# Patient Record
Sex: MALE | Race: BLACK OR AFRICAN AMERICAN | NOT HISPANIC OR LATINO | Employment: FULL TIME | URBAN - METROPOLITAN AREA
[De-identification: names, ages, dates, MRNs, and addresses within clinical notes are randomized per-mention and may not be internally consistent; named-entity substitution may affect disease eponyms.]

---

## 2017-02-01 ENCOUNTER — HOSPITAL ENCOUNTER (EMERGENCY)
Facility: HOSPITAL | Age: 52
Discharge: HOME | End: 2017-02-01
Attending: EMERGENCY MEDICINE
Payer: COMMERCIAL

## 2017-02-01 VITALS
HEIGHT: 71 IN | TEMPERATURE: 97 F | HEART RATE: 67 BPM | WEIGHT: 260 LBS | OXYGEN SATURATION: 99 % | DIASTOLIC BLOOD PRESSURE: 102 MMHG | BODY MASS INDEX: 36.4 KG/M2 | RESPIRATION RATE: 18 BRPM | SYSTOLIC BLOOD PRESSURE: 161 MMHG

## 2017-02-01 DIAGNOSIS — M25.531 RIGHT WRIST PAIN: Primary | ICD-10-CM

## 2017-02-01 LAB
ANION GAP SERPL CALC-SCNC: 11 MMOL/L
BUN SERPL-MCNC: 17 MG/DL
CALCIUM SERPL-MCNC: 9.4 MG/DL
CHLORIDE SERPL-SCNC: 108 MMOL/L
CO2 SERPL-SCNC: 20 MMOL/L
CREAT SERPL-MCNC: 1 MG/DL
EST. GFR  (AFRICAN AMERICAN): >60 ML/MIN/1.73 M^2
EST. GFR  (NON AFRICAN AMERICAN): >60 ML/MIN/1.73 M^2
GLUCOSE SERPL-MCNC: 94 MG/DL
POTASSIUM SERPL-SCNC: 4 MMOL/L
SODIUM SERPL-SCNC: 139 MMOL/L
URATE SERPL-MCNC: 5.9 MG/DL

## 2017-02-01 PROCEDURE — 84550 ASSAY OF BLOOD/URIC ACID: CPT

## 2017-02-01 PROCEDURE — 80048 BASIC METABOLIC PNL TOTAL CA: CPT

## 2017-02-01 PROCEDURE — 25000003 PHARM REV CODE 250: Performed by: EMERGENCY MEDICINE

## 2017-02-01 PROCEDURE — 99283 EMERGENCY DEPT VISIT LOW MDM: CPT

## 2017-02-01 RX ORDER — LISINOPRIL 10 MG/1
10 TABLET ORAL
Status: DISCONTINUED | OUTPATIENT
Start: 2017-02-01 | End: 2017-02-01

## 2017-02-01 RX ORDER — NAPROXEN 500 MG/1
500 TABLET ORAL 2 TIMES DAILY WITH MEALS
Qty: 30 TABLET | Refills: 0 | Status: SHIPPED | OUTPATIENT
Start: 2017-02-01 | End: 2017-11-12

## 2017-02-01 RX ORDER — CLONIDINE HYDROCHLORIDE 0.2 MG/1
0.2 TABLET ORAL
Status: COMPLETED | OUTPATIENT
Start: 2017-02-01 | End: 2017-02-01

## 2017-02-01 RX ORDER — AMLODIPINE BESYLATE 5 MG/1
5 TABLET ORAL
Status: COMPLETED | OUTPATIENT
Start: 2017-02-01 | End: 2017-02-01

## 2017-02-01 RX ORDER — COLCHICINE 0.6 MG/1
0.6 TABLET ORAL DAILY
COMMUNITY
End: 2021-08-06 | Stop reason: CLARIF

## 2017-02-01 RX ADMIN — AMLODIPINE BESYLATE 5 MG: 5 TABLET ORAL at 12:02

## 2017-02-01 RX ADMIN — CLONIDINE HYDROCHLORIDE 0.2 MG: 0.2 TABLET ORAL at 12:02

## 2017-02-01 NOTE — ED NOTES
Pt states he is having a gout flare up. C/o pain, swelling, and redness to right wrist for the past few days. States he normally controls his gout with his diet, but knows he ate and drank things that he is not supposed to in the past few weeks. Has been taking allopurinol, but is out of his colchicine.

## 2017-02-01 NOTE — ED AVS SNAPSHOT
[facility]  [address]  [place] [address]               Jerel WOODS Ponce   2017  8:59 AM   ED    Description:  Male : 1965   Department:  [facility]           Your Care was Coordinated By:     Provider Role From To    Christiane Christianson MD Attending Provider 17 1113 --    ROBBIE Johnson Physician Assistant 17 0906 --      Reason for Visit     Joint Swelling           Diagnoses this Visit        Comments    Right wrist pain    -  Primary       ED Disposition     None           To Do List           Follow-up Information     Go to Peter Barnes Jr, MD.    Specialties:  Hand Surgery, Orthopedic Surgery    Contact information:    [address]  [address]  [place] [address]  [phone]         These Medications        Disp Refills Start End    naproxen (NAPROSYN) 500 MG tablet 30 tablet 0 2017     Take 1 tablet (500 mg total) by mouth 2 (two) times daily with meals. - Oral      [facility]     [facility] -  Assistance  Registered nurses in the [facility] provide clinical advisement, health education, appointment booking, and other advisory services.  Call for this free service at [phone].             Medications           Message regarding Medications     Verify the changes and/or additions to your medication regime listed below are the same as discussed with your clinician today.  If any of these changes or additions are incorrect, please notify your healthcare provider.        START taking these NEW medications        Refills    naproxen (NAPROSYN) 500 MG tablet 0    Sig: Take 1 tablet (500 mg total) by mouth 2 (two) times daily with meals.    Class: Print    Route: Oral      STOP taking these medications     methocarbamol (ROBAXIN) 500 MG Tab Take 2 tablets (1,000 mg total) by mouth every 6 (six) hours as needed.    tramadol (ULTRAM) 50 mg tablet Take 1  "tablet (50 mg total) by mouth every 6 (six) hours as needed for Pain.    indomethacin (INDOCIN) 50 MG capsule Take 50 mg by mouth 3 (three) times daily.           Verify that the below list of medications is an accurate representation of the medications you are currently taking.  If none reported, the list may be blank. If incorrect, please contact your healthcare provider. Carry this list with you in case of emergency.           Current Medications     colchicine 0.6 mg tablet Take 0.6 mg by mouth once daily.    albuterol 90 mcg/actuation inhaler Inhale 1-2 puffs into the lungs every 6 (six) hours as needed for Wheezing.    allopurinol (ZYLOPRIM) 300 MG tablet Take 300 mg by mouth once daily.    lisinopril 10 MG tablet Take 10 mg by mouth once daily.    naproxen (NAPROSYN) 500 MG tablet Take 1 tablet (500 mg total) by mouth 2 (two) times daily with meals.           Clinical Reference Information           Your Vitals Were     BP Pulse Temp Resp Height Weight    168/85 81 97.4 °F (36.3 °C) (Oral) 18 5' 11" (1.803 m) 117.9 kg (260 lb)    SpO2 BMI             99% 36.26 kg/m2         Allergies as of 2/1/2017        Reactions    Aspirin       Immunizations Administered on Date of Encounter - 2/1/2017     None      ED Micro, Lab, POCT     Start Ordered       Status Ordering Provider    02/01/17 1026 02/01/17 1025  Basic metabolic panel  STAT      Final result     02/01/17 1004 02/01/17 1003    Add-on,   Status:  Canceled      Canceled     02/01/17 0930 02/01/17 0930  Uric acid  Once      Final result       ED Imaging Orders     None        Discharge Instructions         Arthralgia    Arthralgia is the term for pain in or around the joint. It is a symptom, not a disease. This pain may involve one or more joints. In some cases, the pain moves from joint to joint.  There are many causes for joint pain. These include:  · Injury  · Osteoarthritis (wearing out of the joint surface)  · Gout (inflammation of the joint due to " crystals in the joint fluid)  · Infection inside the joint    · Bursitis (inflammation of the fluid-filled sacs around the joint)  · Autoimmune disorders such as rheumatoid arthritis or lupus  · Tendonitis (inflamation of chords that attach muscle to bone)  Home care  · Rest the involved joint(s) until your symptoms improve.   · You may be prescribed pain medication. If none is prescribed, you may use acetaminophen or ibuprofen to control pain and inflammation.  Follow up  Follow up with your healthcare provider or our staff as advised.  When to seek medical care  Contact your healthcare provider right away if any of the following occurs:  · Pain, swelling, or redness of joint increases  · Pain worsens or recurs after a period of improvement  · Pain moves to other joints  · You cannot bear weight on the affected joint   · You cannot move the affected joint  · Joint appears deformed  · New rash appears  · Fever of 101ºF (38.8ºC) or higher, or as directed by your healthcare provider  © 7219-1294 [employer]. [address] [place] [address]. All rights reserved. This information is not intended as a substitute for professional medical care. Always follow your healthcare professional's instructions.          Discharge References/Attachments     GANGLION CYST: HAND (ENGLISH)    CARPAL TUNNEL SYNDROME (ENGLISH)      [facility] Sign-Up     Activating your [facility] account is as easy as 1-2-3!     1) Visit [email or web address], select Sign Up Now, enter this activation code and your date of birth, then select Next.  [email or web address]  Expires: 3/18/2017 11:47 AM      2) Create a username and password to use when you visit [facility] in the future and select a security question in case you lose your password and select Next.    3) Enter your e-mail address and click Sign Up!    Additional Information  If you have questions, please e-mail [email or web address] or call [phone] to talk to our [facility]  staff. Remember, [facility] is NOT to be used for urgent needs. For medical emergencies, dial 911.          [facility] complies with applicable Federal civil rights laws and does not discriminate on the basis of race, color, national origin, age, disability, or sex.        Language Assistance Services     ATTENTION: Language assistance services are available, free of charge. Please call [phone].      ATENCIÓN: Si habla español, tiene a arreola disposición servicios gratuitos de asistencia lingüística. Llame al [phone].     CHÚ Ý: N?u b?n nói Ti?ng Vi?t, có các d?ch v? h? tr? ngôn ng? mi?n phí dành cho b?n. G?i s? [phone].

## 2017-02-01 NOTE — DISCHARGE INSTRUCTIONS
Arthralgia    Arthralgia is the term for pain in or around the joint. It is a symptom, not a disease. This pain may involve one or more joints. In some cases, the pain moves from joint to joint.  There are many causes for joint pain. These include:  · Injury  · Osteoarthritis (wearing out of the joint surface)  · Gout (inflammation of the joint due to crystals in the joint fluid)  · Infection inside the joint    · Bursitis (inflammation of the fluid-filled sacs around the joint)  · Autoimmune disorders such as rheumatoid arthritis or lupus  · Tendonitis (inflamation of chords that attach muscle to bone)  Home care  · Rest the involved joint(s) until your symptoms improve.   · You may be prescribed pain medication. If none is prescribed, you may use acetaminophen or ibuprofen to control pain and inflammation.  Follow up  Follow up with your healthcare provider or our staff as advised.  When to seek medical care  Contact your healthcare provider right away if any of the following occurs:  · Pain, swelling, or redness of joint increases  · Pain worsens or recurs after a period of improvement  · Pain moves to other joints  · You cannot bear weight on the affected joint   · You cannot move the affected joint  · Joint appears deformed  · New rash appears  · Fever of 101ºF (38.8ºC) or higher, or as directed by your healthcare provider  © 5918-1364 [employer]. [address] [place] [address], [place] [address]. All rights reserved. This information is not intended as a substitute for professional medical care. Always follow your healthcare professional's instructions.

## 2017-02-01 NOTE — ED PROVIDER NOTES
"Encounter Date: 2/1/2017       History     Chief Complaint   Patient presents with    Joint Swelling     right wrist; states is a "gout flareup" x3 days; swelling noted; states has not been compliant with diet     Review of patient's allergies indicates:   Allergen Reactions    Aspirin      HPI Comments: Jerel Serra 51 y.o.nontoxic/afebrile male with reported PMH of gout and HTN presented to the ED with C/O right wrist pain for the past three days.  He denies any injury to the area and describes the pain as similar to previous episodes of pain for which he was told was gout. 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 He does report some localized swelling of the wrist for some time that was initially nontender but has caused some pain over the past several days. He denies any numbness, tingling, fever, chills, weakness, dropping objects, redness or pain radiation. He reports no relief with allopurinol and that a steroid injection has helped in the past. he has not seen orthopedic regarding this and did not take his BP med's today.    The history is provided by the patient.     Past Medical History   Diagnosis Date    Gout     Gout attack     Hypertension      No past medical history pertinent negatives.  History reviewed. No pertinent past surgical history.  Family History   Problem Relation Age of Onset    Hypertension Mother     Diabetes Father      Social History   Substance Use Topics    Smoking status: Never Smoker    Smokeless tobacco: None    Alcohol use No     Review of Systems   Constitutional: Positive for activity change. Negative for chills and fever.   Eyes: Negative for visual disturbance.   Respiratory: Negative for chest tightness and shortness of breath.    Cardiovascular: Negative for chest pain.   Gastrointestinal: Negative for nausea and vomiting.   Genitourinary: Negative for dysuria.   Musculoskeletal: Positive for arthralgias. Negative for " back pain, joint swelling and myalgias.        Right wrist pain   Skin: Negative for color change and rash.   Allergic/Immunologic: Negative for immunocompromised state.   Neurological: Negative for weakness and numbness.   Hematological: Does not bruise/bleed easily.   All other systems reviewed and are negative.      Physical Exam   Initial Vitals   BP Pulse Resp Temp SpO2   02/01/17 0848 02/01/17 0848 02/01/17 0848 02/01/17 0848 02/01/17 0848   168/85 81 18 97.4 °F (36.3 °C) 99 %     Physical Exam    Nursing note and vitals reviewed.  Constitutional: Vital signs are normal. He appears well-developed and well-nourished. He is cooperative.  Non-toxic appearance. He does not appear ill. No distress.   HENT:   Head: Normocephalic and atraumatic.   Eyes: Conjunctivae and lids are normal.   Neck: Trachea normal and normal range of motion. Neck supple. No stridor present. No tracheal deviation present.   Cardiovascular: Normal rate and regular rhythm.   Pulmonary/Chest: Breath sounds normal. No respiratory distress. He has no wheezes.   Abdominal: Soft. Normal appearance.   Musculoskeletal: Normal range of motion.        Right wrist: He exhibits tenderness and swelling. He exhibits normal range of motion, no bony tenderness, no crepitus and no deformity.        Right hand: He exhibits normal capillary refill. Normal sensation noted. Normal strength noted.        Hands:  TTP of the right dorsal wrist along the ulnar styloid region. There is small freely movable area cystitic like nodule noted to the wrist with no fluctuance, erythema or drainage   Neurological: He is alert and oriented to person, place, and time. He has normal strength. No sensory deficit. GCS eye subscore is 4. GCS verbal subscore is 5. GCS motor subscore is 6.   Skin: Skin is warm, dry and intact. No abrasion, no bruising, no ecchymosis and no rash noted. No erythema.   Psychiatric: He has a normal mood and affect. His speech is normal and behavior is  normal. Thought content normal.         ED Course   Procedures  Labs Reviewed   BASIC METABOLIC PANEL - Abnormal; Notable for the following:        Result Value    CO2 20 (*)     All other components within normal limits   URIC ACID       Jerel Serra 51 y.o.nontoxic/afebrile male with reported PMH of gout and HTN presented to the ED with C/O right wrist pain for the past three days.  He denies any injury to the area and describes the pain as similar to previous episodes of pain for which he was told was gout. 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 He does report some localized swelling of the wrist for some time that was initially nontender but has caused some pain over the past several days. He denies any numbness, tingling, fever, chills, weakness, dropping objects, redness or pain radiation. He reports no relief with allopurinol and that a steroid injection has helped in the past. he has not seen orthopedic regarding this and did not take his BP med's today. ROS positive for right wrist pain.  Physical exam reveals patient well appearing in no obvious distress. FROM of the right upper extremity with some pain of flexion about the right wrist. TTP of the right dorsal wrist along the ulnar styloid region. There is small freely movable area cystitic like nodule noted to the wrist with no fluctuance, erythema or drainage. Patient does have pain exacerbation with Phalen's test. neurovascularly intact.     DDX: gout, OA, pain with ganglion cyst, carpal tunnel, septic joint    ED management: Uric acid and BMP with no significant findings. Low suspicion for infectious process as patient remains well appearing and afebrile. We will send home with NSAID's for inflammation as he has not tired this with follow up with hand specialist. Discussed monitoring BP as it was elevated, low sodium diet,  importance of taking medications and that patient should follow up with PCP next  week.      Impression/Plan:The encounter diagnosis was Right wrist pain. discharged with naprosyn. Patient will follow up with Primary.  Patient cautioned on when to return to ED.  Pt. Understands and agrees with current treatment plan                      Attending Attestation:     Physician Attestation Statement for NP/PA:   I have conducted a face to face encounter with this patient in addition to the NP/PA, due to NP/PA Request    Other NP/PA Attestation Additions:    History of Present Illness: 50 Y/O AAM WITH PMHX OF HTN PRESENTS WITH C/O RIGHT WRIST PAIN x 3 DAYS THAT HE DESCRIBES AS A GOUT FLARE UP.  PAIN WITH ROM AND PALPATION.  + RIGHT WRIST SWELLING.  NO TRAUMA.  NO FEVER/CHILLS.  NO NUMBNESS/TINGLING/WEAKNESS.  NO REDNESS.    Physical Exam: AGREE   Medical Decision Making: AGREE:  PT HAS HTN AND HAS NOT TAKEN HIS MEDS TODAY.  PT IS ON AMLODIPINE DAILY.  HE IS TREATED WITH CLONIDINE AND AMLODIPINE IN ED AND B/P IS TRENDING DOWN SO HE WILL BE D/C HOME WITH CLOSE PCP F/U.  PT EDUCATED ON LOW SALT DIET. HE WILL BE RX NAPROSYN AND F/U WITH DR. CHAMBERLAIN                 ED Course     Clinical Impression:   The encounter diagnosis was Right wrist pain.    Disposition:   Disposition: Discharged  Condition: Stable       ROBBIE Johnson  02/03/17 8652       Christiane Christianson MD  02/04/17 7284

## 2017-11-12 ENCOUNTER — HOSPITAL ENCOUNTER (EMERGENCY)
Facility: HOSPITAL | Age: 52
Discharge: HOME | End: 2017-11-12
Attending: EMERGENCY MEDICINE
Payer: COMMERCIAL

## 2017-11-12 VITALS
WEIGHT: 259 LBS | OXYGEN SATURATION: 96 % | HEIGHT: 71 IN | BODY MASS INDEX: 36.26 KG/M2 | HEART RATE: 77 BPM | SYSTOLIC BLOOD PRESSURE: 142 MMHG | RESPIRATION RATE: 18 BRPM | TEMPERATURE: 99 F | DIASTOLIC BLOOD PRESSURE: 95 MMHG

## 2017-11-12 DIAGNOSIS — J02.0 STREP PHARYNGITIS: Primary | ICD-10-CM

## 2017-11-12 LAB
DEPRECATED S PYO AG THROAT QL EIA: POSITIVE
FLUAV AG SPEC QL IA: NEGATIVE
FLUBV AG SPEC QL IA: NEGATIVE
SPECIMEN SOURCE: NORMAL

## 2017-11-12 PROCEDURE — 99283 EMERGENCY DEPT VISIT LOW MDM: CPT | Mod: 25

## 2017-11-12 PROCEDURE — 96372 THER/PROPH/DIAG INJ SC/IM: CPT

## 2017-11-12 PROCEDURE — 25000003 PHARM REV CODE 250: Performed by: PHYSICIAN ASSISTANT

## 2017-11-12 PROCEDURE — 63600175 PHARM REV CODE 636 W HCPCS: Performed by: PHYSICIAN ASSISTANT

## 2017-11-12 PROCEDURE — 87400 INFLUENZA A/B EACH AG IA: CPT | Mod: 59

## 2017-11-12 PROCEDURE — 87880 STREP A ASSAY W/OPTIC: CPT

## 2017-11-12 RX ORDER — ONDANSETRON 4 MG/1
4 TABLET, ORALLY DISINTEGRATING ORAL
Status: COMPLETED | OUTPATIENT
Start: 2017-11-12 | End: 2017-11-12

## 2017-11-12 RX ORDER — KETOROLAC TROMETHAMINE 10 MG/1
10 TABLET, FILM COATED ORAL
Status: COMPLETED | OUTPATIENT
Start: 2017-11-12 | End: 2017-11-12

## 2017-11-12 RX ORDER — DEXAMETHASONE SODIUM PHOSPHATE 4 MG/ML
8 INJECTION, SOLUTION INTRA-ARTICULAR; INTRALESIONAL; INTRAMUSCULAR; INTRAVENOUS; SOFT TISSUE
Status: COMPLETED | OUTPATIENT
Start: 2017-11-12 | End: 2017-11-12

## 2017-11-12 RX ORDER — IBUPROFEN 600 MG/1
600 TABLET ORAL EVERY 6 HOURS PRN
Qty: 20 TABLET | Refills: 0 | Status: SHIPPED | OUTPATIENT
Start: 2017-11-12 | End: 2019-09-06 | Stop reason: SDUPTHER

## 2017-11-12 RX ADMIN — KETOROLAC TROMETHAMINE 10 MG: 10 TABLET, FILM COATED ORAL at 09:11

## 2017-11-12 RX ADMIN — ONDANSETRON 4 MG: 4 TABLET, ORALLY DISINTEGRATING ORAL at 09:11

## 2017-11-12 RX ADMIN — DEXAMETHASONE SODIUM PHOSPHATE 8 MG: 4 INJECTION, SOLUTION INTRAMUSCULAR; INTRAVENOUS at 09:11

## 2017-11-12 RX ADMIN — PENICILLIN G BENZATHINE 1.2 MILLION UNITS: 1200000 INJECTION, SUSPENSION INTRAMUSCULAR at 09:11

## 2017-11-12 NOTE — ED PROVIDER NOTES
Encounter Date: 11/12/2017       History     Chief Complaint   Patient presents with    Generalized Body Aches     c/o sore throat and body aches since Friday     DD 1-year-old male presents to the ED with complaints of URI symptoms that began Friday he complains of sore throat, lymphadenopathy generalized body aches and nausea.  He does note some mild nasal congestion and postnasal drip.  Denies any other symptoms at this time including fever, cough, chest pain, shortness of breath, vomiting, diarrhea or rash.  He did try NyQuil yesterday with little relief of symptoms.  He denies any sick contacts however things at the local [employer].      The history is provided by the patient.     Review of patient's allergies indicates:   Allergen Reactions    Aspirin      Past Medical History:   Diagnosis Date    Gout     Gout attack     Hypertension      History reviewed. No pertinent surgical history.  Family History   Problem Relation Age of Onset    Hypertension Mother     Diabetes Father      Social History   Substance Use Topics    Smoking status: Never Smoker    Smokeless tobacco: Not on file    Alcohol use No     Review of Systems   Constitutional: Positive for appetite change and chills. Negative for fever.   HENT: Positive for congestion, postnasal drip and sore throat.    Respiratory: Negative for cough and shortness of breath.    Cardiovascular: Negative for chest pain.   Gastrointestinal: Positive for nausea. Negative for vomiting.   Genitourinary: Negative for dysuria.   Musculoskeletal: Negative for back pain, neck pain and neck stiffness.   Skin: Negative for rash.   Neurological: Negative for weakness and headaches.   Hematological: Positive for adenopathy. Does not bruise/bleed easily.       Physical Exam     Initial Vitals [11/12/17 0852]   BP Pulse Resp Temp SpO2   (!) 142/95 77 18 98.7 °F (37.1 °C) 96 %      MAP       110.67         Physical Exam    Nursing note and vitals  reviewed.  Constitutional: Vital signs are normal. He appears well-developed and well-nourished. He is cooperative.  Non-toxic appearance. He does not appear ill. No distress.   HENT:   Head: Normocephalic and atraumatic.   Nose: Mucosal edema present.   Oropharynx with erythema; bilateral tonsillar edema and erythema   Eyes: Conjunctivae and lids are normal.   Neck: Trachea normal and normal range of motion. Neck supple. No stridor present. No tracheal deviation present.   Cardiovascular: Normal rate and regular rhythm.   Pulmonary/Chest: Breath sounds normal. No respiratory distress. He has no wheezes. He has no rhonchi.   Abdominal: Soft. Normal appearance.   Musculoskeletal: Normal range of motion.   Lymphadenopathy:        Head (right side): Tonsillar adenopathy present.        Head (left side): Tonsillar adenopathy present.   Neurological: He is alert and oriented to person, place, and time. GCS eye subscore is 4. GCS verbal subscore is 5. GCS motor subscore is 6.   Skin: Skin is warm, dry and intact. No rash noted.   Psychiatric: He has a normal mood and affect. His speech is normal and behavior is normal. Thought content normal.         ED Course   Procedures  Labs Reviewed   THROAT SCREEN, RAPID - Abnormal; Notable for the following:        Result Value    Rapid Strep A Screen Positive (*)     All other components within normal limits   INFLUENZA A AND B ANTIGEN       Jerel Serra, a nontoxic/well appearing, afebrile, 51 y.o., male, presented to the ED with c/o URI symptoms. ROS positive for sore throat, body aches, nausea & lymphadenopathy. ROS negative for rash, abdominal pain, vomiting, diarrhea, CP, SOB, cough, ear pain, numbness, weakness, tingling, or any other complaints. Physical exam reveals Oropharynx with erythema; bilateral tonsillar edema and erythema; uvula midline; bilateral tonsillar lymphadenopathy.  No other significant abnormalities on exam    DDX: Strep, Influenza, URI    ED  management:Strep screen positive.  Patient is a Cent[facility] 2 out of 4 with a positive test L be treated with Bicillin.  Decadron, Zofran and Toradol in the ED with improvement in symptoms.  Instructed to take anti-inflammatories as directed    Impression/Plan: Strep pharyngitis Patient will follow up with PCP or the [facility] clinic in 2-3 days for a recheck. Precautions on when to return to the ED given. Patient verbalizes and agrees with current treatment plan.                      Attending Attestation:     Physician Attestation Statement for NP/PA:   I discussed this assessment and plan of this patient with the NP/PA, but I did not personally examine the patient. The face to face encounter was performed by the NP/PA.                  ED Course      Clinical Impression:   The encounter diagnosis was Strep pharyngitis.                           ROBBIE Johnson  11/12/17 0971       Tyler Rocha MD  11/12/17 9273

## 2017-11-23 ENCOUNTER — HOSPITAL ENCOUNTER (EMERGENCY)
Facility: HOSPITAL | Age: 52
Discharge: HOME | End: 2017-11-23
Attending: EMERGENCY MEDICINE
Payer: COMMERCIAL

## 2017-11-23 VITALS
DIASTOLIC BLOOD PRESSURE: 90 MMHG | RESPIRATION RATE: 18 BRPM | SYSTOLIC BLOOD PRESSURE: 155 MMHG | BODY MASS INDEX: 36.4 KG/M2 | HEIGHT: 71 IN | OXYGEN SATURATION: 96 % | WEIGHT: 260 LBS | TEMPERATURE: 98 F | HEART RATE: 79 BPM

## 2017-11-23 DIAGNOSIS — M10.9 ACUTE GOUT OF LEFT ANKLE, UNSPECIFIED CAUSE: Primary | ICD-10-CM

## 2017-11-23 PROCEDURE — 99283 EMERGENCY DEPT VISIT LOW MDM: CPT | Mod: 25

## 2017-11-23 PROCEDURE — 63600175 PHARM REV CODE 636 W HCPCS: Performed by: EMERGENCY MEDICINE

## 2017-11-23 PROCEDURE — 96372 THER/PROPH/DIAG INJ SC/IM: CPT

## 2017-11-23 RX ORDER — KETOROLAC TROMETHAMINE 30 MG/ML
60 INJECTION, SOLUTION INTRAMUSCULAR; INTRAVENOUS
Status: COMPLETED | OUTPATIENT
Start: 2017-11-23 | End: 2017-11-23

## 2017-11-23 RX ADMIN — KETOROLAC TROMETHAMINE 60 MG: 30 INJECTION, SOLUTION INTRAMUSCULAR at 06:11

## 2017-11-23 NOTE — ED PROVIDER NOTES
Encounter Date: 11/23/2017       History     Chief Complaint   Patient presents with    Heel Pain     L medial heel pain, onset Tuesday.  Hx of gout, reports out of colchicine.      Patient is a 51-year-old male who complains of a 2 day history of pain to his left ankle.  He denies trauma.  Patient says he has a history of gout and describes the pain as similar to prior gout flareups.  He has had no fever or chills.  He has prescriptions at home for Indocin and colchicine.      The history is provided by the patient.     Review of patient's allergies indicates:   Allergen Reactions    Aspirin      Past Medical History:   Diagnosis Date    Gout     Gout attack     Hypertension      History reviewed. No pertinent surgical history.  Family History   Problem Relation Age of Onset    Hypertension Mother     Diabetes Father      Social History   Substance Use Topics    Smoking status: Never Smoker    Smokeless tobacco: Never Used    Alcohol use No     Review of Systems   Constitutional: Negative for chills and fever.   Musculoskeletal:        Left ankle pain.   Skin: Negative for color change and rash.   Neurological: Negative for numbness.   All other systems reviewed and are negative.      Physical Exam     Initial Vitals [11/23/17 0609]   BP Pulse Resp Temp SpO2   (!) 171/98 68 17 98.4 °F (36.9 °C) 100 %      MAP       122.33         Physical Exam    Nursing note and vitals reviewed.  Constitutional: No distress.   HENT:   Head: Normocephalic and atraumatic.   Eyes: EOM are normal.   Neck: Normal range of motion. Neck supple.   Cardiovascular: Normal rate, regular rhythm and normal heart sounds.   Pulmonary/Chest: Breath sounds normal.   Musculoskeletal:   LEFT ANKLE : Mild diffuse swelling and tenderness. No erythema or rash. No sensory deficits. DP and PT pulses 2+.   Neurological: He is alert and oriented to person, place, and time.   Skin: Skin is warm and dry. No rash noted. No erythema.   Psychiatric:  His behavior is normal. Thought content normal.         ED Course   Procedures  Labs Reviewed - No data to display          Medical Decision Making:   ED Management:  51-year-old male with gout of his left ankle.  Patient was given a 60 mg intramuscular shot of Toradol here in the ED.  He will continue his colchicine and Indocin at home.  I have suggested to follow-up with his primary physician if not resolved in one week.                   ED Course      Clinical Impression:   The encounter diagnosis was Acute gout of left ankle, unspecified cause.                           Tyler Rocha MD  11/23/17 0725

## 2018-02-21 ENCOUNTER — HOSPITAL ENCOUNTER (EMERGENCY)
Facility: HOSPITAL | Age: 53
Discharge: HOME | End: 2018-02-21
Attending: EMERGENCY MEDICINE
Payer: COMMERCIAL

## 2018-02-21 VITALS
OXYGEN SATURATION: 100 % | HEIGHT: 72 IN | RESPIRATION RATE: 18 BRPM | HEART RATE: 65 BPM | TEMPERATURE: 98 F | WEIGHT: 260 LBS | DIASTOLIC BLOOD PRESSURE: 93 MMHG | BODY MASS INDEX: 35.21 KG/M2 | SYSTOLIC BLOOD PRESSURE: 146 MMHG

## 2018-02-21 DIAGNOSIS — M10.9 ACUTE GOUT OF MULTIPLE SITES, UNSPECIFIED CAUSE: Primary | ICD-10-CM

## 2018-02-21 PROCEDURE — 96372 THER/PROPH/DIAG INJ SC/IM: CPT

## 2018-02-21 PROCEDURE — 99283 EMERGENCY DEPT VISIT LOW MDM: CPT | Mod: 25

## 2018-02-21 PROCEDURE — 63600175 PHARM REV CODE 636 W HCPCS: Performed by: EMERGENCY MEDICINE

## 2018-02-21 RX ORDER — OXYCODONE AND ACETAMINOPHEN 7.5; 325 MG/1; MG/1
1 TABLET ORAL EVERY 6 HOURS PRN
Qty: 20 TABLET | Refills: 0 | Status: SHIPPED | OUTPATIENT
Start: 2018-02-21 | End: 2020-08-24

## 2018-02-21 RX ORDER — KETOROLAC TROMETHAMINE 30 MG/ML
60 INJECTION, SOLUTION INTRAMUSCULAR; INTRAVENOUS
Status: COMPLETED | OUTPATIENT
Start: 2018-02-21 | End: 2018-02-21

## 2018-02-21 RX ADMIN — KETOROLAC TROMETHAMINE 60 MG: 30 INJECTION, SOLUTION INTRAMUSCULAR at 12:02

## 2018-02-21 NOTE — ED PROVIDER NOTES
Encounter Date: 2/21/2018       History     Chief Complaint   Patient presents with    Gout     pt. reports flare up of gout in rt. wrist and ankles x2 days.      Patient is a 52-year-old male with a history of gout who complains of nontraumatic pain and swelling to both of his ankles and his right wrist.  He describes this pain as his usual gout flareup.  He has had no fever or chills.  He was taking colchicine and Indocin with no relief.      The history is provided by the patient.     Review of patient's allergies indicates:   Allergen Reactions    Aspirin      Past Medical History:   Diagnosis Date    Gout     Gout attack     Hypertension      History reviewed. No pertinent surgical history.  Family History   Problem Relation Age of Onset    Hypertension Mother     Diabetes Father      Social History   Substance Use Topics    Smoking status: Never Smoker    Smokeless tobacco: Never Used    Alcohol use No     Review of Systems   Constitutional: Negative for chills and fever.   Musculoskeletal:        Bilateral ankle pain.  Right wrist pain.   Skin: Negative for color change and rash.   Neurological: Negative for numbness.   All other systems reviewed and are negative.      Physical Exam     Initial Vitals [02/21/18 0027]   BP Pulse Resp Temp SpO2   (!) 152/93 76 20 98.2 °F (36.8 °C) 97 %      MAP       112.67         Physical Exam    Nursing note and vitals reviewed.  Constitutional: No distress.   HENT:   Head: Atraumatic.   Eyes: EOM are normal.   Neck: Normal range of motion. Neck supple.   Cardiovascular: Normal rate, regular rhythm and normal heart sounds.   Pulmonary/Chest: Breath sounds normal.   Musculoskeletal:   There is mild diffuse swelling and tenderness of both ankles with decreased range of motion due to pain.  No erythema.  There is also mild diffuse swelling and tenderness of the right wrist.  Neurovascularly intact.   Neurological: He is alert.   Skin: Skin is warm and dry.    Psychiatric: His behavior is normal. Thought content normal.         ED Course   Procedures  Labs Reviewed - No data to display          Medical Decision Making:   ED Management:  52-year-old male with a gout flareup.  He was given 60 mg of Toradol intramuscularly here in the ED.  Since he is already taking colchicine and Indocin I will prescribe him a very short course of Percocet.  I have suggested a follow-up with his primary physician for any further treatment.                      Clinical Impression:   The encounter diagnosis was Acute gout of multiple sites, unspecified cause.                           Tyler Rocha MD  02/21/18 0127

## 2018-03-23 ENCOUNTER — HOSPITAL ENCOUNTER (EMERGENCY)
Facility: HOSPITAL | Age: 53
Discharge: HOME | End: 2018-03-23
Attending: EMERGENCY MEDICINE
Payer: COMMERCIAL

## 2018-03-23 VITALS
HEIGHT: 72 IN | SYSTOLIC BLOOD PRESSURE: 177 MMHG | DIASTOLIC BLOOD PRESSURE: 95 MMHG | WEIGHT: 265 LBS | RESPIRATION RATE: 17 BRPM | BODY MASS INDEX: 35.89 KG/M2 | OXYGEN SATURATION: 97 % | TEMPERATURE: 98 F | HEART RATE: 82 BPM

## 2018-03-23 DIAGNOSIS — R07.9 CHEST PAIN: Primary | ICD-10-CM

## 2018-03-23 LAB — TROPONIN I SERPL DL<=0.01 NG/ML-MCNC: 0.01 NG/ML

## 2018-03-23 PROCEDURE — 93005 ELECTROCARDIOGRAM TRACING: CPT

## 2018-03-23 PROCEDURE — 99284 EMERGENCY DEPT VISIT MOD MDM: CPT

## 2018-03-23 PROCEDURE — 84484 ASSAY OF TROPONIN QUANT: CPT

## 2018-03-23 RX ORDER — AMLODIPINE BESYLATE 5 MG/1
5 TABLET ORAL DAILY
COMMUNITY
End: 2020-08-24

## 2018-03-23 NOTE — DISCHARGE INSTRUCTIONS
Please follow up with your Primary Care Provider as soon as possible. You need to schedule a Stress Test with your PCP at your earliest convenience.    Should you have any immediate concerns, please return to the ED at any time.

## 2018-03-23 NOTE — ED PROVIDER NOTES
Encounter Date: 3/23/2018    SCRIBE #1 NOTE: I, Ralph Zavala, am scribing for, and in the presence of,  Dr. Brantley. I have scribed the entire note.       History     Chief Complaint   Patient presents with    Chest Pain     Pt states midsternal chest pains started approximately 1AM this morning while he was at work at [employer]. Denies nausea or shortness of breath. Pt states he is stressed at work and at home due to parents illnesses.      9:20 AM  This is a 52 y.o. male who has a past medical history of Gout; Gout attack; and Hypertension.   The patient presents to the Emergency Department with midsternal chest pain which began around 1 AM this morning while he was working at [employer]. He reports onset of pain while lifting something at work.  Patient states he is stressed at work and at home due to parent's illnesses.  Pt denies no nausea, vomiting, cough, congestion, or SOB.   Symptoms are slightly aggravated by strong breathing. The pain is not worsened with stretching.  Symptoms are relieved by rest.   Patient has no prior history of similar symptoms, and no HTN, DM, and is not a smoker. He also denies any FHx of cardiac disease.   Pt has no past surgical history on file.      The history is provided by the patient.     Review of patient's allergies indicates:   Allergen Reactions    Aspirin      Past Medical History:   Diagnosis Date    Gout     Gout attack     Hypertension      History reviewed. No pertinent surgical history.  Family History   Problem Relation Age of Onset    Hypertension Mother     Diabetes Father      Social History   Substance Use Topics    Smoking status: Never Smoker    Smokeless tobacco: Never Used    Alcohol use Yes      Comment: occasional     Review of Systems   Constitutional: Negative for fever.   HENT: Negative for congestion and facial swelling.    Eyes: Negative for visual disturbance.   Respiratory: Negative for cough and shortness of breath.    Cardiovascular:  Positive for chest pain (midsternal chest wall pain).   Gastrointestinal: Negative for nausea and vomiting.   Genitourinary: Negative for dysuria.   Musculoskeletal: Negative for gait problem.   Skin: Negative for rash.   Neurological: Negative for speech difficulty.       Physical Exam     Initial Vitals [03/23/18 0906]   BP Pulse Resp Temp SpO2   (!) 177/98 82 18 98.2 °F (36.8 °C) 99 %      MAP       124.33         Physical Exam    Nursing note and vitals reviewed.  Constitutional: He appears well-developed and well-nourished. He is not diaphoretic. No distress.   HENT:   Head: Normocephalic and atraumatic.   Eyes: Conjunctivae and EOM are normal.   Neck: Normal range of motion. Neck supple.   Cardiovascular: Normal rate, regular rhythm and normal heart sounds. Exam reveals no gallop and no friction rub.    No murmur heard.  Pulmonary/Chest: Breath sounds normal. No respiratory distress.   Abdominal: There is no tenderness.   Musculoskeletal: Normal range of motion. He exhibits no edema or tenderness.   No reproducible chest wall TTP   Lymphadenopathy:     He has no cervical adenopathy.   Neurological: He is alert and oriented to person, place, and time.   Skin: Skin is warm and dry.         ED Course   Procedures  Labs Reviewed   TROPONIN I     EKG Readings: (Independently Interpreted)   EKG: NSR at 77 bpm, nl axis, nl intervals, no hypertrophy, no ST-T changes as read by me (Dr. Brantley). There are no significant changes in comparison to previous EKG on 6/29/07.     Imaging Results          X-Ray Chest AP Portable (Final result)  Result time 03/23/18 10:35:29    Final result by Janak Stratton MD (03/23/18 10:35:29)                 Impression:      No acute cardiopulmonary process.      Electronically signed by: Janak Stratton MD  Date:    03/23/2018  Time:    10:35             Narrative:    EXAMINATION:  XR CHEST AP PORTABLE    CLINICAL HISTORY:  Chest pain, unspecified    TECHNIQUE:  PA and lateral views  of the chest were performed.    COMPARISON:  May 16, 2015    FINDINGS:  Cardiac silhouette and mediastinal contours are normal.  Lungs are clear.  Osseous structures are intact.                                              Scribe Attestation:   Scribe #1: I performed the above scribed service and the documentation accurately describes the services I performed. I attest to the accuracy of the note.            ED Course as of Mar 23 1049   Fri Mar 23, 2018   0932 This is an emergent evaluation of a 52 y.o.male patient with presentation of cp with activity at 1am, improved with rest.   Initial differentials include but are not limited to: Angina, ACS, pleurisy, MSK pain, PE.   Plan: trop, ekg, cxr, card monitoring, ASA    [NP]   1029 HEART Score For Major Cardiac Events  History (slightly-highly suspicious) = 1/2 pts  EKG: (NL, Nonspec, Sig ST changes) = 0/2pts  Age: (<45, 45-65, >65) = 1/2pts  RF (HTN, High Chol, DM, Cig, FH, Obesity): (0, 1-2, 3+) = 1/2pts  Troponin: (nl, 1-3x nl limit, >3x nl) = 0/2pts    TOTAL PTS: 3    Low (0-3pts) = risk of MACE 0.9-1.7%  Moderate (4-7pts) = risk of MACE 12-16.6%  High (8-10pts) = MACE 50-65%    [NP]   1029 Troponin I: 0.012 [NP]   1037 Workup negative.   I believe the patient has CP, nonspecific, possible stable angina. Recommended close f/u with PCP in 3 days for further eval and   [NP]      ED Course User Index  [NP] Greyson Brantley MD     Clinical Impression:     1. Chest pain              Scribe attestation I, Dr. Greyson Brantley, personally performed the services described in this documentation.   All medical record entries made by the scribe were at my direction and in my presence.   I have reviewed the chart and agree that the record is accurate and complete.   Greyson Brantley MD.                   Greyson Brantley MD  03/23/18 3061

## 2018-03-23 NOTE — ED TRIAGE NOTES
Pt presents to ED today c/o non radiating chest pain onset 0100 this am while lifting at work. He denies N/V,

## 2018-03-26 DIAGNOSIS — R07.9 CHEST PAIN: Primary | ICD-10-CM

## 2018-06-14 ENCOUNTER — HOSPITAL ENCOUNTER (EMERGENCY)
Facility: HOSPITAL | Age: 53
Discharge: HOME | End: 2018-06-14
Attending: EMERGENCY MEDICINE
Payer: COMMERCIAL

## 2018-06-14 VITALS
HEIGHT: 72 IN | WEIGHT: 265 LBS | DIASTOLIC BLOOD PRESSURE: 102 MMHG | RESPIRATION RATE: 18 BRPM | HEART RATE: 82 BPM | OXYGEN SATURATION: 100 % | TEMPERATURE: 99 F | BODY MASS INDEX: 35.89 KG/M2 | SYSTOLIC BLOOD PRESSURE: 188 MMHG

## 2018-06-14 DIAGNOSIS — T14.8XXA MUSCLE STRAIN: Primary | ICD-10-CM

## 2018-06-14 PROCEDURE — 99283 EMERGENCY DEPT VISIT LOW MDM: CPT | Mod: 25

## 2018-06-14 PROCEDURE — 96372 THER/PROPH/DIAG INJ SC/IM: CPT

## 2018-06-14 PROCEDURE — 63600175 PHARM REV CODE 636 W HCPCS: Performed by: EMERGENCY MEDICINE

## 2018-06-14 RX ORDER — ORPHENADRINE CITRATE 30 MG/ML
60 INJECTION INTRAMUSCULAR; INTRAVENOUS
Status: COMPLETED | OUTPATIENT
Start: 2018-06-14 | End: 2018-06-14

## 2018-06-14 RX ORDER — METHOCARBAMOL 750 MG/1
1500 TABLET, FILM COATED ORAL EVERY 8 HOURS PRN
Qty: 30 TABLET | Refills: 0 | Status: SHIPPED | OUTPATIENT
Start: 2018-06-14 | End: 2018-06-19

## 2018-06-14 RX ADMIN — ORPHENADRINE CITRATE 60 MG: 30 INJECTION INTRAMUSCULAR; INTRAVENOUS at 03:06

## 2018-06-14 NOTE — ED NOTES
To ER with c/o pain to left groin that is unrelieved by motrin.  Dr. Goldstein at the bedside.  Awake and alert, oriented x 4.  No distress noted.

## 2018-06-14 NOTE — DISCHARGE INSTRUCTIONS
You may take the prescribed muscle relaxer as well as ibuprofen 600mg every 6 hours.  Alternate heat for 20 minutes, gentle stretching, then ice for 20 minutes.  Do this several times a day.  You may also wear pain patches like SalonPas or Thermacare.  Your muscle will heal over time, but it does take a while because you are constantly using it.

## 2018-06-14 NOTE — ED PROVIDER NOTES
Encounter Date: 6/14/2018    SCRIBE #1 NOTE: I, Sj Lim, am scribing for, and in the presence of,  Dr. Marlen Goldstein. I have scribed the entire note.       History     Chief Complaint   Patient presents with    Groin Pain     lt. groin/thigh pain x1.5 weeks. pt. was seen by pcp and prescribed ibuprofen 800mg, dx. muscle strain. reports not improving.      Jerel Serra is a 52 y.o. male who  has a past medical history of Gout; Gout attack; and Hypertension.    Patient presents to the ED due to left sided groin pain x 1.5 weeks. 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 Pain is worsened with movement of the L leg, specifically abduction or pivoting and trying to get up. Denies swelling,  Bulges, or scrotal swelling, GI symptoms, rashes, fever, bruising at the site of pain. Pt went to PCP for this same pain and was given 800 mg ibuprofen which did not alleviate his symptoms.  No changes in the pain.  No pain at rest.        The history is provided by the patient.     Review of patient's allergies indicates:   Allergen Reactions    Aspirin      Past Medical History:   Diagnosis Date    Gout     Gout attack     Hypertension      History reviewed. No pertinent surgical history.  Family History   Problem Relation Age of Onset    Hypertension Mother     Diabetes Father      Social History   Substance Use Topics    Smoking status: Never Smoker    Smokeless tobacco: Never Used    Alcohol use Yes      Comment: occasional     Review of Systems   Musculoskeletal: Positive for myalgias. Negative for joint swelling.        L groin/ hip pain   Skin: Negative for color change and rash.   All other systems reviewed and are negative.      Physical Exam     Initial Vitals [06/14/18 0305]   BP Pulse Resp Temp SpO2   (!) 196/105 77 20 98.8 °F (37.1 °C) 97 %      MAP       --         Physical Exam    Nursing note and vitals reviewed.  Constitutional: He appears well-developed and  well-nourished. No distress.   HENT:   Head: Normocephalic and atraumatic.   Eyes: Conjunctivae are normal.   Neck: Normal range of motion.   Cardiovascular: Normal rate and regular rhythm.   No murmur heard.  Pulmonary/Chest: Breath sounds normal. No respiratory distress.   Musculoskeletal: Normal range of motion. He exhibits tenderness.        Legs:  Tenderness L groin area, no swelling/erythema/rash, no hernia palpated   Neurological: He is alert and oriented to person, place, and time.   Skin: Skin is warm and dry. No rash noted. No erythema.   Psychiatric: He has a normal mood and affect. His behavior is normal.         ED Course   Procedures  Labs Reviewed - No data to display       No orders to display        Medical Decision Making:   Initial Assessment:       ED Management:  Patient is a 52 y.o. male presenting to ED with L groin pain x1.5 week.   Exam with tenderness to the area.  Pain reproduced with movement.  I feel this is muscle strain.    Will give Norflex IM and Rx muscle relaxer.                      Clinical Impression:   The encounter diagnosis was Muscle strain.         I, Dr. Marlen Goldstein, personally performed the services described in this documentation.   All medical record entries made by the scribe were at my direction and in my presence.   I have reviewed the chart and agree that the record is accurate and complete.   Marlen Goldstein MD.  5:15 AM 06/14/2018                    Marlen Goldstein MD  06/14/18 0519

## 2018-07-08 ENCOUNTER — HOSPITAL ENCOUNTER (EMERGENCY)
Facility: HOSPITAL | Age: 53
Discharge: HOME | End: 2018-07-08
Attending: EMERGENCY MEDICINE
Payer: COMMERCIAL

## 2018-07-08 VITALS
HEIGHT: 66 IN | BODY MASS INDEX: 43.23 KG/M2 | DIASTOLIC BLOOD PRESSURE: 93 MMHG | OXYGEN SATURATION: 97 % | TEMPERATURE: 98 F | HEART RATE: 91 BPM | SYSTOLIC BLOOD PRESSURE: 142 MMHG | WEIGHT: 269 LBS | RESPIRATION RATE: 20 BRPM

## 2018-07-08 DIAGNOSIS — R52 PAIN: ICD-10-CM

## 2018-07-08 DIAGNOSIS — S76.212A GROIN STRAIN, LEFT, INITIAL ENCOUNTER: Primary | ICD-10-CM

## 2018-07-08 PROCEDURE — 99283 EMERGENCY DEPT VISIT LOW MDM: CPT | Mod: 25

## 2018-07-08 RX ORDER — HYDROCODONE BITARTRATE AND ACETAMINOPHEN 5; 325 MG/1; MG/1
1 TABLET ORAL EVERY 6 HOURS PRN
Qty: 14 TABLET | Refills: 0 | Status: SHIPPED | OUTPATIENT
Start: 2018-07-08 | End: 2020-08-24

## 2018-07-08 RX ORDER — IBUPROFEN 600 MG/1
600 TABLET ORAL EVERY 6 HOURS PRN
Qty: 20 TABLET | Refills: 0 | Status: SHIPPED | OUTPATIENT
Start: 2018-07-08 | End: 2021-08-06 | Stop reason: CLARIF

## 2018-07-09 NOTE — ED PROVIDER NOTES
Encounter Date: 7/8/2018    SCRIBE #1 NOTE: I, Nerissa Leroy, am scribing for, and in the presence of,  Dr. Mondragon. I have scribed the entire note.       History     Chief Complaint   Patient presents with    Leg Pain     patient to triage ambulatory and reports a few weeks of inner upper left thigh pain and pain has not gone away; pt reports he was seen here for same and saw pcp also for the same pain     Time seen by the provider: 9:40 PM    This is a 52 y.o. male with PMHx of HTN and Gout who presents to the ED with a cc of  left sided groin pain x 3 weeks. He does physical work at a [employer], but cannot confirm exact injury or trauma that led to pain. Pain is worsened with movement of the left leg, specifically abduction or pivoting and trying to get up. Pt denies urinary or bowel incontinence, dysuria, numbness, or weakness. He reports no alleviating factors. Pt was seen for similar complaints by PCP who diagnosed him with a muscle strain, and in the ED on 06/14 where he was sent home with muscle relaxer.        The history is provided by the patient.     Review of patient's allergies indicates:   Allergen Reactions    Aspirin      Past Medical History:   Diagnosis Date    Gout     Gout attack     Hypertension      History reviewed. No pertinent surgical history.  Family History   Problem Relation Age of Onset    Hypertension Mother     Diabetes Father      Social History   Substance Use Topics    Smoking status: Never Smoker    Smokeless tobacco: Never Used    Alcohol use Yes      Comment: occasional     Review of Systems   Constitutional: Negative for fever.   HENT: Negative for sore throat.    Respiratory: Negative for cough and shortness of breath.    Cardiovascular: Negative for chest pain.   Gastrointestinal: Negative for abdominal pain, diarrhea, nausea and vomiting.   Genitourinary: Negative for dysuria and flank pain.        Negative for urinary or bowel incontinence.    Musculoskeletal:  Positive for myalgias (Left groin pain). Negative for arthralgias and back pain.   Skin: Negative for rash.   Neurological: Negative for weakness and numbness.   Hematological: Does not bruise/bleed easily.   Psychiatric/Behavioral: Negative.  Negative for confusion and hallucinations.       Physical Exam     Initial Vitals [07/08/18 2003]   BP Pulse Resp Temp SpO2   (!) 145/77 89 20 98 °F (36.7 °C) 97 %      MAP       --         Physical Exam    Nursing note and vitals reviewed.  Constitutional: He appears well-developed and well-nourished.   HENT:   Head: Normocephalic and atraumatic.   Mouth/Throat: Oropharynx is clear and moist.   Eyes: EOM are normal. Pupils are equal, round, and reactive to light.   Neck: Normal range of motion. Neck supple.   Cardiovascular: Normal rate, regular rhythm, normal heart sounds and intact distal pulses. Exam reveals no gallop and no friction rub.    No murmur heard.  Distal pulses are 2/4, no left femoral artery bruit   Pulmonary/Chest: Breath sounds normal. No respiratory distress. He has no wheezes. He has no rhonchi. He has no rales. He exhibits no tenderness.   Abdominal: Soft. Bowel sounds are normal. He exhibits no distension. There is no tenderness. There is no rebound and no guarding.   Genitourinary: Prostate normal and penis normal. No discharge found.   Genitourinary Comments: No testicular tenderness or enlargement. No masses, no inguinal lymphadenopathy   Musculoskeletal: Normal range of motion. He exhibits no edema.   Tenderness to inguinal ligament. No bulging with valsalva. Positive groin pain with hip flexion. No hip tenderness. No LE edema.   Neurological: He is alert and oriented to person, place, and time. He has normal strength. No sensory deficit.   Negative bilateral straight leg test, no saddle anesthesia   Skin: Skin is warm and dry. No rash noted. No erythema.   Psychiatric: He has a normal mood and affect. His behavior is normal. Judgment and thought  content normal.         ED Course   Procedures  Labs Reviewed - No data to display       Imaging Results          X-Ray Hip 2 View Left (In process)  Result time 07/08/18 22:05:09                 Medical Decision Making:   Initial Assessment:   This is a 52 y.o. male with PMHx of HTN and Gout who presents to the ED with a cc of  left sided groin pain x 3 weeks. Will order pain meds, labs and reassess.  Differential Diagnosis:   Fracture, dislocation, compartment syndrome, nerve injury/palsy, vascular injury, rhabdomyolysis, hemarthrosis, septic joint, bursitis, muscle strain, ligament tear/sprain, laceration with foreign body, abrasion, soft tissue contusion, osteoarthritis.    Clinical Tests:   Radiological Study: Ordered and Reviewed  ED Management:      10:46 PM  No acute osseous injury on radiographs, low clinical suspicion for vascular catastrophe, symptoms likely secondary to ligamentous/muscular strain, no evidence for herniation, no obvious fracture on radiographs, will prescribe analgesics and recommend close follow-up with PCP                      Clinical Impression:     1. Groin strain, left, initial encounter                   Scribe attestation: I, Dr. Lisandro Mondragon, personally performed the services described in this documentation. All medical record entries made by the scribe were at my direction and in my presence.  I have reviewed the chart and agree that the record reflects my personal performance and is accurate and complete                       Lisandro Mondragon MD  07/08/18 8811

## 2018-07-09 NOTE — ED NOTES
Presents to ER C/O pain to L groin/thigh/hip  for 3-4 weeks. Pain increases when sitting for extended periods. Denies trauma. Has been to primary MD for same problem. Gait steady. Peripheral pulses palpable.

## 2019-09-06 ENCOUNTER — HOSPITAL ENCOUNTER (EMERGENCY)
Facility: HOSPITAL | Age: 54
Discharge: HOME | End: 2019-09-06
Attending: EMERGENCY MEDICINE
Payer: COMMERCIAL

## 2019-09-06 VITALS
RESPIRATION RATE: 18 BRPM | SYSTOLIC BLOOD PRESSURE: 141 MMHG | OXYGEN SATURATION: 95 % | HEIGHT: 72 IN | HEART RATE: 111 BPM | DIASTOLIC BLOOD PRESSURE: 100 MMHG | WEIGHT: 260 LBS | BODY MASS INDEX: 35.21 KG/M2 | TEMPERATURE: 100 F

## 2019-09-06 DIAGNOSIS — J02.0 STREP PHARYNGITIS: Primary | ICD-10-CM

## 2019-09-06 LAB
DEPRECATED S PYO AG THROAT QL EIA: POSITIVE
INFLUENZA A, MOLECULAR: NEGATIVE
INFLUENZA B, MOLECULAR: NEGATIVE
SPECIMEN SOURCE: NORMAL

## 2019-09-06 PROCEDURE — 63600175 PHARM REV CODE 636 W HCPCS: Performed by: EMERGENCY MEDICINE

## 2019-09-06 PROCEDURE — 99284 EMERGENCY DEPT VISIT MOD MDM: CPT | Mod: 25

## 2019-09-06 PROCEDURE — 25000003 PHARM REV CODE 250: Performed by: NURSE PRACTITIONER

## 2019-09-06 PROCEDURE — 25000003 PHARM REV CODE 250: Performed by: EMERGENCY MEDICINE

## 2019-09-06 PROCEDURE — 87880 STREP A ASSAY W/OPTIC: CPT

## 2019-09-06 PROCEDURE — 87502 INFLUENZA DNA AMP PROBE: CPT

## 2019-09-06 PROCEDURE — 96372 THER/PROPH/DIAG INJ SC/IM: CPT

## 2019-09-06 RX ORDER — DEXAMETHASONE SODIUM PHOSPHATE 4 MG/ML
8 INJECTION, SOLUTION INTRA-ARTICULAR; INTRALESIONAL; INTRAMUSCULAR; INTRAVENOUS; SOFT TISSUE
Status: COMPLETED | OUTPATIENT
Start: 2019-09-06 | End: 2019-09-06

## 2019-09-06 RX ORDER — ACETAMINOPHEN 500 MG
1000 TABLET ORAL
Status: COMPLETED | OUTPATIENT
Start: 2019-09-06 | End: 2019-09-06

## 2019-09-06 RX ORDER — ACETAMINOPHEN 325 MG/1
650 TABLET ORAL
Status: COMPLETED | OUTPATIENT
Start: 2019-09-06 | End: 2019-09-06

## 2019-09-06 RX ORDER — IBUPROFEN 600 MG/1
600 TABLET ORAL EVERY 6 HOURS PRN
Qty: 20 TABLET | Refills: 0 | Status: SHIPPED | OUTPATIENT
Start: 2019-09-06 | End: 2021-08-06 | Stop reason: CLARIF

## 2019-09-06 RX ADMIN — ACETAMINOPHEN 1000 MG: 500 TABLET ORAL at 09:09

## 2019-09-06 RX ADMIN — PENICILLIN G BENZATHINE 1.2 MILLION UNITS: 1200000 INJECTION, SUSPENSION INTRAMUSCULAR at 09:09

## 2019-09-06 RX ADMIN — DEXAMETHASONE SODIUM PHOSPHATE 8 MG: 4 INJECTION, SOLUTION INTRAMUSCULAR; INTRAVENOUS at 09:09

## 2019-09-06 RX ADMIN — ACETAMINOPHEN 650 MG: 325 TABLET ORAL at 07:09

## 2019-09-07 NOTE — ED NOTES
Patient reports sore throat but is able to maintain his oral secretions. Patient does not appear distressed.

## 2019-09-07 NOTE — ED PROVIDER NOTES
Encounter Date: 9/6/2019    SCRIBE #1 NOTE: I, Kieran Luis, am scribing for, and in the presence of,  Dr.Lefort. I have scribed the entire note.       History     Chief Complaint   Patient presents with    Sore Throat     Patient presents to the ED with reports of having a sore throat with body aches that started x 2 days ago. Reports having taken dayquil and ryne-seltzer cold plus with no relief.     Generalized Body Aches     Jerel Serra is a 53 y.o. male who  has a past medical history of Gout, Gout attack, and Hypertension.    The patient presents to the ED due to sore throat. Pain is mild, constant.  Denies difficulty swallowing although slightly painful.  He has associated myalgias, chills, and sweating. He denies congestion, runny nose, or other complaints. Patient admits to having contact with his grandson who was recently sick as well.    The history is provided by the patient.     Review of patient's allergies indicates:   Allergen Reactions    Aspirin      Past Medical History:   Diagnosis Date    Gout     Gout attack     Hypertension      No past surgical history on file.  Family History   Problem Relation Age of Onset    Hypertension Mother     Diabetes Father      Social History     Tobacco Use    Smoking status: Never Smoker    Smokeless tobacco: Never Used   Substance Use Topics    Alcohol use: Yes     Comment: occasional    Drug use: No     Review of Systems   Constitutional: Positive for chills and diaphoresis. Negative for fever.   HENT: Positive for sore throat. Negative for congestion, ear pain and rhinorrhea.    Respiratory: Negative for cough, shortness of breath and wheezing.    Cardiovascular: Negative for chest pain and palpitations.   Gastrointestinal: Negative for abdominal pain, diarrhea, nausea and vomiting.   Genitourinary: Negative for dysuria and hematuria.   Musculoskeletal: Positive for myalgias. Negative for back pain and neck pain.   Skin: Negative for rash.    Neurological: Negative for dizziness, weakness, light-headedness and headaches.   Hematological: Positive for adenopathy (Cervical).   Psychiatric/Behavioral: Negative for confusion.   All other systems reviewed and are negative.      Physical Exam     Initial Vitals [09/06/19 1911]   BP Pulse Resp Temp SpO2   (!) 185/102 (!) 122 20 99.9 °F (37.7 °C) 96 %      MAP       --         Physical Exam    Nursing note and vitals reviewed.  Constitutional: He appears well-developed and well-nourished.   HENT:   Head: Normocephalic and atraumatic.   Mouth/Throat: Uvula is midline. No trismus in the jaw.   Pus on tonsils   Eyes: Conjunctivae are normal.   Neck: Neck supple.   Cardiovascular: Regular rhythm, normal heart sounds and intact distal pulses. Tachycardia present.  Exam reveals no gallop and no friction rub.    No murmur heard.  Pulmonary/Chest: Breath sounds normal. He has no wheezes. He has no rhonchi. He has no rales.   Abdominal: Soft. He exhibits no distension. There is no tenderness.   Musculoskeletal: Normal range of motion.   Neurological: He is alert and oriented to person, place, and time.   Skin: Skin is warm and dry. Capillary refill takes less than 2 seconds. No rash noted. No erythema.   Psychiatric: He has a normal mood and affect. Thought content normal.         ED Course   Procedures  Labs Reviewed   THROAT SCREEN, RAPID - Abnormal; Notable for the following components:       Result Value    Rapid Strep A Screen Positive (*)     All other components within normal limits   INFLUENZA A & B BY MOLECULAR          Imaging Results    None          Medical Decision Making:   Initial Assessment:   Nontoxic appearing, no respiratory distress  Differential Diagnosis:   Differential Diagnosis includes, but is not limited to:  Sepsis, bacteremia, UTI, pneumonia, cellulitis, abscess, indwelling line/catheter infection, cholecystitis, viral URI, gastroenteritis, viral syndrome, sinusitis, otitis media/externa,  neoplasm, drug reaction, serotonin syndrome, intoxication/withdrawal syndrome.    Clinical Tests:   Lab Tests: Ordered and Reviewed  ED Management:  After complete evaluation, including thorough history and physical exam, the patient's symptoms are most likely due to streptococcal pharyngitis. There are no concerning features on physical exam to suggest bacterial otitis media/externa, sinusitis, or peritonsillar abscess.  Tachycardia is suspected to be compensatory secondary to fever.. Lung sounds are clear and not consistent with pneumonia. There is no neck pain or limited ROM to suggest retropharyngeal abscess or meningitis. The patient will be treated with supportive care in addition to IM penicillin and Decadron in the ED.. Will provide RX for ibuprofen upon D/C.  We discussed expected course of illness, indications for ED return, PCP follow-up to ensure resolution.                        Clinical Impression:       ICD-10-CM ICD-9-CM   1. Strep pharyngitis J02.0 034.0           Disposition:   Disposition: Discharged  Condition: Stable       I, Dr. Guy Lefort, personally performed the services described in this documentation. All medical record entries made by the scribe were at my direction and in my presence. I have reviewed the chart and agree that the record reflects my personal performance and is accurate and complete. Guy Lefort, MD.  9:15 PM 09/06/2019   scribe attestation                 Guy J. Lefort, MD  09/06/19 9955

## 2019-09-07 NOTE — ED NOTES
Patient presents to the ED with reports of having a sore throat with body aches that started x 2 days ago. Reports having taken dayquil and ryne-seltzer cold plus with no relief.     Patient identifiers for Jerel Serra verified by spelling and stated name on armband along with .     APPEARANCE: Alert, oriented and in no acute distress.  CARDIAC: Normal rate and rhythm, no murmur heard.   PERIPHERAL VASCULAR: peripheral pulses present. Normal cap refill. No edema. Warm to touch.    RESPIRATORY:Normal rate and effort, breath sounds clear bilaterally throughout chest. Respirations are equal and unlabored no obvious signs of distress.+ sore throat  GASTRO: soft, bowel sounds normal, no tenderness, no abdominal distention.  MUSC: Full ROM. No bony tenderness or soft tissue tenderness. No obvious deformity.  SKIN: Skin is warm and dry, normal skin turgor, mucous membranes moist.  MENTAL STATUS: awake, alert and aware of environment.

## 2020-08-24 ENCOUNTER — HOSPITAL ENCOUNTER (EMERGENCY)
Facility: HOSPITAL | Age: 55
Discharge: HOME | End: 2020-08-24
Attending: EMERGENCY MEDICINE
Payer: COMMERCIAL

## 2020-08-24 VITALS
SYSTOLIC BLOOD PRESSURE: 122 MMHG | DIASTOLIC BLOOD PRESSURE: 70 MMHG | HEART RATE: 67 BPM | HEIGHT: 72 IN | OXYGEN SATURATION: 98 % | TEMPERATURE: 99 F | WEIGHT: 270 LBS | RESPIRATION RATE: 17 BRPM | BODY MASS INDEX: 36.57 KG/M2

## 2020-08-24 DIAGNOSIS — M62.82 NON-TRAUMATIC RHABDOMYOLYSIS: Primary | ICD-10-CM

## 2020-08-24 DIAGNOSIS — R07.89 ATYPICAL CHEST PAIN: ICD-10-CM

## 2020-08-24 LAB
ALBUMIN SERPL BCP-MCNC: 3.9 G/DL (ref 3.5–5.2)
ALP SERPL-CCNC: 95 U/L (ref 55–135)
ALT SERPL W/O P-5'-P-CCNC: 50 U/L (ref 10–44)
ANION GAP SERPL CALC-SCNC: 6 MMOL/L (ref 8–16)
AST SERPL-CCNC: 41 U/L (ref 10–40)
BASOPHILS # BLD AUTO: 0.07 K/UL (ref 0–0.2)
BASOPHILS NFR BLD: 1.3 % (ref 0–1.9)
BILIRUB SERPL-MCNC: 0.3 MG/DL (ref 0.1–1)
BILIRUB UR QL STRIP: NEGATIVE
BUN SERPL-MCNC: 16 MG/DL (ref 6–20)
CALCIUM SERPL-MCNC: 8.7 MG/DL (ref 8.7–10.5)
CHLORIDE SERPL-SCNC: 105 MMOL/L (ref 95–110)
CK SERPL-CCNC: 1398 U/L (ref 20–200)
CK SERPL-CCNC: 1583 U/L (ref 20–200)
CLARITY UR: CLEAR
CO2 SERPL-SCNC: 28 MMOL/L (ref 23–29)
COLOR UR: YELLOW
CREAT SERPL-MCNC: 1.2 MG/DL (ref 0.5–1.4)
DIFFERENTIAL METHOD: ABNORMAL
EOSINOPHIL # BLD AUTO: 0.2 K/UL (ref 0–0.5)
EOSINOPHIL NFR BLD: 2.8 % (ref 0–8)
ERYTHROCYTE [DISTWIDTH] IN BLOOD BY AUTOMATED COUNT: 13.9 % (ref 11.5–14.5)
EST. GFR  (AFRICAN AMERICAN): >60 ML/MIN/1.73 M^2
EST. GFR  (NON AFRICAN AMERICAN): >60 ML/MIN/1.73 M^2
GLUCOSE SERPL-MCNC: 197 MG/DL (ref 70–110)
GLUCOSE UR QL STRIP: NEGATIVE
HCT VFR BLD AUTO: 42 % (ref 40–54)
HGB BLD-MCNC: 13.7 G/DL (ref 14–18)
HGB UR QL STRIP: NEGATIVE
IMM GRANULOCYTES # BLD AUTO: 0.01 K/UL (ref 0–0.04)
IMM GRANULOCYTES NFR BLD AUTO: 0.2 % (ref 0–0.5)
KETONES UR QL STRIP: NEGATIVE
LEUKOCYTE ESTERASE UR QL STRIP: NEGATIVE
LYMPHOCYTES # BLD AUTO: 3.3 K/UL (ref 1–4.8)
LYMPHOCYTES NFR BLD: 61.8 % (ref 18–48)
MAGNESIUM SERPL-MCNC: 2 MG/DL (ref 1.6–2.6)
MCH RBC QN AUTO: 29.3 PG (ref 27–31)
MCHC RBC AUTO-ENTMCNC: 32.6 G/DL (ref 32–36)
MCV RBC AUTO: 90 FL (ref 82–98)
MONOCYTES # BLD AUTO: 0.5 K/UL (ref 0.3–1)
MONOCYTES NFR BLD: 8.3 % (ref 4–15)
NEUTROPHILS # BLD AUTO: 1.4 K/UL (ref 1.8–7.7)
NEUTROPHILS NFR BLD: 25.6 % (ref 38–73)
NITRITE UR QL STRIP: NEGATIVE
NRBC BLD-RTO: 0 /100 WBC
PH UR STRIP: 6 [PH] (ref 5–8)
PLATELET # BLD AUTO: 169 K/UL (ref 150–350)
PMV BLD AUTO: 12 FL (ref 9.2–12.9)
POTASSIUM SERPL-SCNC: 4.2 MMOL/L (ref 3.5–5.1)
PROT SERPL-MCNC: 7.2 G/DL (ref 6–8.4)
PROT UR QL STRIP: NEGATIVE
RBC # BLD AUTO: 4.68 M/UL (ref 4.6–6.2)
SODIUM SERPL-SCNC: 139 MMOL/L (ref 136–145)
SP GR UR STRIP: 1.02 (ref 1–1.03)
TROPONIN I SERPL DL<=0.01 NG/ML-MCNC: 0.01 NG/ML (ref 0–0.03)
URN SPEC COLLECT METH UR: NORMAL
UROBILINOGEN UR STRIP-ACNC: NEGATIVE EU/DL
WBC # BLD AUTO: 5.39 K/UL (ref 3.9–12.7)

## 2020-08-24 PROCEDURE — 85025 COMPLETE CBC W/AUTO DIFF WBC: CPT

## 2020-08-24 PROCEDURE — 96361 HYDRATE IV INFUSION ADD-ON: CPT

## 2020-08-24 PROCEDURE — 25000003 PHARM REV CODE 250: Performed by: EMERGENCY MEDICINE

## 2020-08-24 PROCEDURE — 80053 COMPREHEN METABOLIC PANEL: CPT

## 2020-08-24 PROCEDURE — 82550 ASSAY OF CK (CPK): CPT | Mod: 91

## 2020-08-24 PROCEDURE — 93010 EKG 12-LEAD: ICD-10-PCS | Mod: ,,, | Performed by: INTERNAL MEDICINE

## 2020-08-24 PROCEDURE — 84484 ASSAY OF TROPONIN QUANT: CPT

## 2020-08-24 PROCEDURE — 81003 URINALYSIS AUTO W/O SCOPE: CPT

## 2020-08-24 PROCEDURE — 82550 ASSAY OF CK (CPK): CPT

## 2020-08-24 PROCEDURE — 99285 EMERGENCY DEPT VISIT HI MDM: CPT | Mod: 25

## 2020-08-24 PROCEDURE — 96360 HYDRATION IV INFUSION INIT: CPT

## 2020-08-24 PROCEDURE — 83735 ASSAY OF MAGNESIUM: CPT

## 2020-08-24 PROCEDURE — 93005 ELECTROCARDIOGRAM TRACING: CPT

## 2020-08-24 PROCEDURE — 93010 ELECTROCARDIOGRAM REPORT: CPT | Mod: ,,, | Performed by: INTERNAL MEDICINE

## 2020-08-24 RX ORDER — AMLODIPINE AND BENAZEPRIL HYDROCHLORIDE 5; 20 MG/1; MG/1
CAPSULE ORAL
COMMUNITY
Start: 2020-07-05

## 2020-08-24 RX ADMIN — SODIUM CHLORIDE 1000 ML: 0.9 INJECTION, SOLUTION INTRAVENOUS at 06:08

## 2020-08-24 RX ADMIN — SODIUM CHLORIDE 1000 ML: 0.9 INJECTION, SOLUTION INTRAVENOUS at 05:08

## 2020-08-24 NOTE — ED NOTES
Patient is being discharged to home. Patient verbalizes understanding of discharged instructions and follow up visit

## 2020-08-24 NOTE — ED NOTES
APPEARANCE: Alert, oriented and in no acute distress.  CARDIAC: Normal rate and rhythm, no murmur heard.   PERIPHERAL VASCULAR: peripheral pulses present. Normal cap refill. No edema. Warm to touch.    RESPIRATORY:Normal rate and effort, breath sounds clear bilaterally throughout chest. Respirations are equal and unlabored no obvious signs of distress.  GASTRO: soft, bowel sounds normal, no tenderness, no abdominal distention.  MUSC: Full ROM. No bony tenderness or soft tissue tenderness. No obvious deformity.  SKIN: Skin is warm and dry, normal skin turgor, mucous membranes moist.  NEURO: 5/5 strength major flexors/extensors bilaterally. Sensory intact to light touch bilaterally. [place] coma scale: eyes open spontaneously-4, oriented & converses-5, obeys commands-6. No neurological abnormalities.   MENTAL STATUS: awake, alert and aware of environment.  EYE: PERRL, both eyes: pupils brisk and reactive to light. Normal size.  ENT: EARS: no obvious drainage. NOSE: no active bleeding.

## 2020-08-24 NOTE — ED TRIAGE NOTES
"54y M ambulatory to ED from home with c/o "sweating a lot at work last night," 1 episode of sharp midsternal CP while at work, and generalized body aches.  "

## 2020-08-24 NOTE — ED PROVIDER NOTES
Encounter Date: 8/24/2020       History     Chief Complaint   Patient presents with    Generalized Body Aches     Patient presents to the ED with reports of having body aches and cramping that started last night.     Cramping     This is a 54-year-old male with a history of hypertension and gout who presents the ER for evaluation of muscle cramping in atypical chest pain.  Reports he works as a  for a [employer] company, was working the night shift, the building was harder than normal, and he was sweating profusely.  He was reporting he change for sure to secondary to a sweat and afterwards started having cramping with movement.  He also described in intermittent atypical chest discomfort without any shortness of breath nausea vomiting fever chill.  Never had any symptoms like that before he came to the ER for further evaluation.        Review of patient's allergies indicates:   Allergen Reactions    Aspirin      Past Medical History:   Diagnosis Date    Gout     Gout attack     Hypertension      No past surgical history on file.  Family History   Problem Relation Age of Onset    Hypertension Mother     Diabetes Father      Social History     Tobacco Use    Smoking status: Never Smoker    Smokeless tobacco: Never Used   Substance Use Topics    Alcohol use: Yes     Comment: occasional    Drug use: No     Review of Systems   Constitutional: Positive for fatigue. Negative for fever.   Respiratory: Negative for chest tightness and shortness of breath.    Cardiovascular: Positive for chest pain. Negative for palpitations.   Gastrointestinal: Negative for abdominal pain.   Musculoskeletal: Positive for myalgias.   All other systems reviewed and are negative.      Physical Exam     Initial Vitals [08/24/20 0524]   BP Pulse Resp Temp SpO2   (!) 182/97 81 20 98.5 °F (36.9 °C) 98 %      MAP       --         Physical Exam    Constitutional: He appears well-developed and well-nourished. He is not diaphoretic. No  distress.   HENT:   Head: Normocephalic and atraumatic.   Eyes: Pupils are equal, round, and reactive to light.   Neck: Normal range of motion.   Cardiovascular: Normal rate, regular rhythm and normal heart sounds.   Pulmonary/Chest: Breath sounds normal. No respiratory distress. He has no wheezes.   Abdominal: Soft. He exhibits no distension. There is no abdominal tenderness.   Musculoskeletal: Normal range of motion.   Neurological: He is alert and oriented to person, place, and time. He has normal strength. GCS score is 15. GCS eye subscore is 4. GCS verbal subscore is 5. GCS motor subscore is 6.   Skin: Skin is warm and dry. Capillary refill takes less than 2 seconds.   Psychiatric: He has a normal mood and affect. His behavior is normal. Thought content normal.         ED Course   Procedures  Labs Reviewed   CBC W/ AUTO DIFFERENTIAL - Abnormal; Notable for the following components:       Result Value    Hemoglobin 13.7 (*)     Gran # (ANC) 1.4 (*)     Gran% 25.6 (*)     Lymph% 61.8 (*)     All other components within normal limits   COMPREHENSIVE METABOLIC PANEL - Abnormal; Notable for the following components:    Glucose 197 (*)     AST 41 (*)     ALT 50 (*)     Anion Gap 6 (*)     All other components within normal limits   CK - Abnormal; Notable for the following components:    CPK 1583 (*)     All other components within normal limits   CK - Abnormal; Notable for the following components:    CPK 1398 (*)     All other components within normal limits   MAGNESIUM   TROPONIN I   URINALYSIS, REFLEX TO URINE CULTURE    Narrative:     Specimen Source->Urine        ECG Results          EKG 12-lead (Final result)  Result time 08/24/20 11:30:48    Final result by Interface, Lab In [facility] (08/24/20 11:30:48)                 Narrative:    Test Reason : R07.89,    Vent. Rate : 075 BPM     Atrial Rate : 075 BPM     P-R Int : 160 ms          QRS Dur : 078 ms      QT Int : 400 ms       P-R-T Axes : 064 -22 072 degrees      QTc Int : 446 ms    Normal sinus rhythm with sinus arrhythmia  Nonspecific ST and T wave abnormality  Abnormal ECG  When compared with ECG of 23-MAR-2018 09:19,  No significant change was found  Confirmed by Eduardo PUGH MD, Joseph GILMORE ([id number]) on 8/24/2020 11:30:37 AM    Referred By: STEPHANIE   SELF           Confirmed By:Joseph Clark III, MD                            Imaging Results          X-Ray Chest PA And Lateral (Final result)  Result time 08/24/20 06:07:59    Final result by Kedar Arias MD (08/24/20 06:07:59)                 Impression:      No obvious evidence of an acute pulmonary process.      Electronically signed by: Kedar Arias  Date:    08/24/2020  Time:    06:07             Narrative:    EXAMINATION:  XR CHEST PA AND LATERAL    CLINICAL HISTORY:  Other chest pain    TECHNIQUE:  PA and lateral views of the chest were performed.    COMPARISON:  August 23, 2018    FINDINGS:  Multiple views of the chest reveals that the lungs are well aerated.  No indication of a consolidative process or pleural effusion.  No pulmonary nodules.  The heart is normal in size and contour.  The lateral projection is unremarkable.  No air under the diaphragm.  EKG wires are present.                                 Medical Decision Making:   Initial Assessment:   54-year-old male presents the ER for evaluation of muscle cramping.  Worked in a building, poor air circulation, was diaphoretic head and changes sugar, also complaining of chest discomfort around 12.  Came to the ER for further evaluation.  Resting comfortably in bed no distress unremarkable physical exam, slightly hypertensive.  Differential includes dehydration, rhabdo, electrolyte abnormality, NSTEMI, ACS, pneumonia versus other cause.  Will obtain blood work symptomatic control IV fluids will reassess.                   ED Course as of Aug 24 1451   Mon Aug 24, 2020   0548 EKG normal sinus rhythm at 74 beats per minute artifact noted, no STEMI     [SE]   0600 Patient signed out to 6am physician to fu bloodwork and dispo.    [SE]      ED Course User Index  [SE] He Carroll MD                Clinical Impression:       ICD-10-CM ICD-9-CM   1. Non-traumatic rhabdomyolysis  M62.82 728.88   2. Atypical chest pain  R07.89 786.59         Disposition:   Disposition: Discharged  Condition: Stable     ED Disposition Condition    Discharge Stable        ED Prescriptions     None        Follow-up Information     Follow up With Specialties Details Why Contact Info    Wiley Piedra MD Internal Medicine Schedule an appointment as soon as possible for a visit   [address] [place] [id number]  [phone]      [facility] Emergency Medicine  If symptoms worsen [address] [facility] [place] [address] [phone]                                     He Carroll MD  08/24/20 1457

## 2020-08-24 NOTE — Clinical Note
Teandrei CHUCK Serra was seen and treated in our emergency department on 8/24/2020.  He may return to work on 08/25/2020.       If you have any questions or concerns, please don't hesitate to call.      Breana Chavez LPN

## 2020-08-24 NOTE — PROGRESS NOTES
Care patient accepted from Dr. Carroll at 6:00 a.m. shift change.  Initial CPK was 1583.  Patient was given 2 L of IV fluid with a repeat CPK of 1398.  Patient has good renal function with a creatinine of 1.2 and a GFR greater than 60.  He is not vomiting and able to take an p.o. fluids.  Feel the patient may be safely discharged home.  Urged him to increase his fluid intake today rest.  He will follow up with his primary physician when able most importantly return to the ED for any possible worsening.

## 2021-08-06 ENCOUNTER — HOSPITAL ENCOUNTER (EMERGENCY)
Facility: HOSPITAL | Age: 56
Discharge: HOME | End: 2021-08-06
Attending: EMERGENCY MEDICINE
Payer: COMMERCIAL

## 2021-08-06 VITALS
HEIGHT: 72 IN | RESPIRATION RATE: 16 BRPM | OXYGEN SATURATION: 98 % | DIASTOLIC BLOOD PRESSURE: 98 MMHG | SYSTOLIC BLOOD PRESSURE: 161 MMHG | BODY MASS INDEX: 32.91 KG/M2 | TEMPERATURE: 98 F | WEIGHT: 243 LBS | HEART RATE: 82 BPM

## 2021-08-06 DIAGNOSIS — J06.9 UPPER RESPIRATORY TRACT INFECTION, UNSPECIFIED TYPE: Primary | ICD-10-CM

## 2021-08-06 LAB
CTP QC/QA: YES
SARS-COV-2 RDRP RESP QL NAA+PROBE: NEGATIVE

## 2021-08-06 PROCEDURE — 99284 EMERGENCY DEPT VISIT MOD MDM: CPT | Mod: 25

## 2021-08-06 PROCEDURE — U0002 COVID-19 LAB TEST NON-CDC: HCPCS | Performed by: NURSE PRACTITIONER

## 2021-08-06 RX ORDER — INSULIN GLARGINE 100 [IU]/ML
10 INJECTION, SOLUTION SUBCUTANEOUS
COMMUNITY
Start: 2021-07-09

## 2021-08-06 RX ORDER — CETIRIZINE HYDROCHLORIDE 10 MG/1
10 TABLET ORAL DAILY
Qty: 30 TABLET | Refills: 0 | Status: SHIPPED | OUTPATIENT
Start: 2021-08-06 | End: 2021-09-05

## 2021-08-06 RX ORDER — FLUTICASONE PROPIONATE 50 MCG
SPRAY, SUSPENSION (ML) NASAL
COMMUNITY
Start: 2021-08-04

## 2021-08-06 RX ORDER — BENZONATATE 100 MG/1
200 CAPSULE ORAL 3 TIMES DAILY PRN
Qty: 21 CAPSULE | Refills: 0 | Status: SHIPPED | OUTPATIENT
Start: 2021-08-06 | End: 2021-08-13

## 2021-08-06 RX ORDER — METFORMIN HYDROCHLORIDE 500 MG/1
500 TABLET ORAL 2 TIMES DAILY
COMMUNITY
Start: 2021-07-21

## 2022-02-19 ENCOUNTER — HOSPITAL ENCOUNTER (INPATIENT)
Facility: HOSPITAL | Age: 57
LOS: 1 days | Discharge: LEFT BEFORE DISCHARGE | DRG: 948 | End: 2022-02-21
Attending: EMERGENCY MEDICINE | Admitting: HOSPITALIST
Payer: COMMERCIAL

## 2022-02-19 DIAGNOSIS — R74.8 ELEVATED LIVER ENZYMES: Primary | ICD-10-CM

## 2022-02-19 DIAGNOSIS — R17 JAUNDICE: ICD-10-CM

## 2022-02-19 DIAGNOSIS — R10.10 UPPER ABDOMINAL PAIN: ICD-10-CM

## 2022-02-19 DIAGNOSIS — R11.10 ABDOMINAL PAIN WITH VOMITING: ICD-10-CM

## 2022-02-19 DIAGNOSIS — R10.9 ABDOMINAL PAIN WITH VOMITING: ICD-10-CM

## 2022-02-19 DIAGNOSIS — R07.9 CHEST PAIN: ICD-10-CM

## 2022-02-19 PROBLEM — E78.00 PURE HYPERCHOLESTEROLEMIA: Status: ACTIVE | Noted: 2022-02-19

## 2022-02-19 PROBLEM — E11.65 TYPE 2 DIABETES MELLITUS WITH HYPERGLYCEMIA: Status: ACTIVE | Noted: 2021-07-15

## 2022-02-19 LAB
ALBUMIN SERPL BCP-MCNC: 3.5 G/DL (ref 3.5–5.2)
ALP SERPL-CCNC: 192 U/L (ref 55–135)
ALT SERPL W/O P-5'-P-CCNC: 3320 U/L (ref 10–44)
AMMONIA PLAS-SCNC: 93 UMOL/L (ref 10–50)
AMMONIA PLAS-SCNC: 93 UMOL/L (ref 10–50)
ANION GAP SERPL CALC-SCNC: 9 MMOL/L (ref 8–16)
ANISOCYTOSIS BLD QL SMEAR: SLIGHT
APAP SERPL-MCNC: <3 UG/ML (ref 10–20)
AST SERPL-CCNC: 1561 U/L (ref 10–40)
BACTERIA #/AREA URNS HPF: NORMAL /HPF
BASOPHILS # BLD AUTO: ABNORMAL K/UL (ref 0–0.2)
BASOPHILS NFR BLD: 1 % (ref 0–1.9)
BILIRUB SERPL-MCNC: 4.6 MG/DL (ref 0.1–1)
BILIRUB UR QL STRIP: ABNORMAL
BUN SERPL-MCNC: 11 MG/DL (ref 6–20)
CALCIUM SERPL-MCNC: 8.5 MG/DL (ref 8.7–10.5)
CHLORIDE SERPL-SCNC: 105 MMOL/L (ref 95–110)
CK SERPL-CCNC: 532 U/L (ref 20–200)
CLARITY UR: CLEAR
CO2 SERPL-SCNC: 25 MMOL/L (ref 23–29)
COLOR UR: YELLOW
CREAT SERPL-MCNC: 0.9 MG/DL (ref 0.5–1.4)
CTP QC/QA: YES
DIFFERENTIAL METHOD: ABNORMAL
EOSINOPHIL # BLD AUTO: ABNORMAL K/UL (ref 0–0.5)
EOSINOPHIL NFR BLD: 0 % (ref 0–8)
ERYTHROCYTE [DISTWIDTH] IN BLOOD BY AUTOMATED COUNT: 16.7 % (ref 11.5–14.5)
EST. GFR  (AFRICAN AMERICAN): >60 ML/MIN/1.73 M^2
EST. GFR  (NON AFRICAN AMERICAN): >60 ML/MIN/1.73 M^2
ETHANOL SERPL-MCNC: <10 MG/DL
GLUCOSE SERPL-MCNC: 144 MG/DL (ref 70–110)
GLUCOSE UR QL STRIP: NEGATIVE
HCT VFR BLD AUTO: 46.2 % (ref 40–54)
HGB BLD-MCNC: 15.5 G/DL (ref 14–18)
HGB UR QL STRIP: NEGATIVE
HYALINE CASTS #/AREA URNS LPF: 0 /LPF
IMM GRANULOCYTES # BLD AUTO: ABNORMAL K/UL (ref 0–0.04)
IMM GRANULOCYTES NFR BLD AUTO: ABNORMAL % (ref 0–0.5)
INR PPP: 1.8 (ref 0.8–1.2)
KETONES UR QL STRIP: NEGATIVE
LEUKOCYTE ESTERASE UR QL STRIP: NEGATIVE
LIPASE SERPL-CCNC: 59 U/L (ref 4–60)
LYMPHOCYTES # BLD AUTO: ABNORMAL K/UL (ref 1–4.8)
LYMPHOCYTES NFR BLD: 58 % (ref 18–48)
MCH RBC QN AUTO: 30 PG (ref 27–31)
MCHC RBC AUTO-ENTMCNC: 33.5 G/DL (ref 32–36)
MCV RBC AUTO: 90 FL (ref 82–98)
MICROSCOPIC COMMENT: NORMAL
MONOCYTES # BLD AUTO: ABNORMAL K/UL (ref 0.3–1)
MONOCYTES NFR BLD: 10 % (ref 4–15)
NEUTROPHILS NFR BLD: 31 % (ref 38–73)
NITRITE UR QL STRIP: NEGATIVE
NRBC BLD-RTO: 0 /100 WBC
PH UR STRIP: 6 [PH] (ref 5–8)
PLATELET # BLD AUTO: 181 K/UL (ref 150–450)
PLATELET BLD QL SMEAR: ABNORMAL
PMV BLD AUTO: 12.1 FL (ref 9.2–12.9)
POCT GLUCOSE: 107 MG/DL (ref 70–110)
POCT GLUCOSE: 130 MG/DL (ref 70–110)
POIKILOCYTOSIS BLD QL SMEAR: SLIGHT
POLYCHROMASIA BLD QL SMEAR: ABNORMAL
POTASSIUM SERPL-SCNC: 3.8 MMOL/L (ref 3.5–5.1)
PROT SERPL-MCNC: 6.8 G/DL (ref 6–8.4)
PROT UR QL STRIP: ABNORMAL
PROTHROMBIN TIME: 17.8 SEC (ref 9–12.5)
RBC # BLD AUTO: 5.16 M/UL (ref 4.6–6.2)
RBC #/AREA URNS HPF: 0 /HPF (ref 0–4)
SARS-COV-2 RDRP RESP QL NAA+PROBE: NEGATIVE
SODIUM SERPL-SCNC: 139 MMOL/L (ref 136–145)
SP GR UR STRIP: 1.03 (ref 1–1.03)
TSH SERPL DL<=0.005 MIU/L-ACNC: 1.25 UIU/ML (ref 0.4–4)
URN SPEC COLLECT METH UR: ABNORMAL
UROBILINOGEN UR STRIP-ACNC: >=8 EU/DL
WBC # BLD AUTO: 4.24 K/UL (ref 3.9–12.7)
WBC #/AREA URNS HPF: 1 /HPF (ref 0–5)

## 2022-02-19 PROCEDURE — 82550 ASSAY OF CK (CPK): CPT | Performed by: EMERGENCY MEDICINE

## 2022-02-19 PROCEDURE — U0002 COVID-19 LAB TEST NON-CDC: HCPCS | Performed by: NURSE PRACTITIONER

## 2022-02-19 PROCEDURE — 96361 HYDRATE IV INFUSION ADD-ON: CPT

## 2022-02-19 PROCEDURE — 85027 COMPLETE CBC AUTOMATED: CPT | Performed by: NURSE PRACTITIONER

## 2022-02-19 PROCEDURE — 63600175 PHARM REV CODE 636 W HCPCS: Performed by: NURSE PRACTITIONER

## 2022-02-19 PROCEDURE — 82140 ASSAY OF AMMONIA: CPT | Performed by: EMERGENCY MEDICINE

## 2022-02-19 PROCEDURE — 85610 PROTHROMBIN TIME: CPT | Performed by: NURSE PRACTITIONER

## 2022-02-19 PROCEDURE — 81000 URINALYSIS NONAUTO W/SCOPE: CPT | Performed by: NURSE PRACTITIONER

## 2022-02-19 PROCEDURE — 80053 COMPREHEN METABOLIC PANEL: CPT | Performed by: NURSE PRACTITIONER

## 2022-02-19 PROCEDURE — 99285 EMERGENCY DEPT VISIT HI MDM: CPT | Mod: 25

## 2022-02-19 PROCEDURE — 82962 GLUCOSE BLOOD TEST: CPT

## 2022-02-19 PROCEDURE — 80143 DRUG ASSAY ACETAMINOPHEN: CPT | Performed by: NURSE PRACTITIONER

## 2022-02-19 PROCEDURE — 83690 ASSAY OF LIPASE: CPT | Performed by: NURSE PRACTITIONER

## 2022-02-19 PROCEDURE — 96374 THER/PROPH/DIAG INJ IV PUSH: CPT

## 2022-02-19 PROCEDURE — G0378 HOSPITAL OBSERVATION PER HR: HCPCS

## 2022-02-19 PROCEDURE — 84443 ASSAY THYROID STIM HORMONE: CPT | Performed by: NURSE PRACTITIONER

## 2022-02-19 PROCEDURE — 85007 BL SMEAR W/DIFF WBC COUNT: CPT | Performed by: NURSE PRACTITIONER

## 2022-02-19 PROCEDURE — 96372 THER/PROPH/DIAG INJ SC/IM: CPT | Performed by: NURSE PRACTITIONER

## 2022-02-19 PROCEDURE — 82077 ASSAY SPEC XCP UR&BREATH IA: CPT | Performed by: EMERGENCY MEDICINE

## 2022-02-19 RX ORDER — ENOXAPARIN SODIUM 100 MG/ML
40 INJECTION SUBCUTANEOUS EVERY 24 HOURS
Status: DISCONTINUED | OUTPATIENT
Start: 2022-02-19 | End: 2022-02-21 | Stop reason: HOSPADM

## 2022-02-19 RX ORDER — ONDANSETRON 2 MG/ML
4 INJECTION INTRAMUSCULAR; INTRAVENOUS EVERY 8 HOURS PRN
Status: DISCONTINUED | OUTPATIENT
Start: 2022-02-19 | End: 2022-02-21 | Stop reason: HOSPADM

## 2022-02-19 RX ORDER — ALLOPURINOL 100 MG/1
300 TABLET ORAL DAILY
Status: DISCONTINUED | OUTPATIENT
Start: 2022-02-20 | End: 2022-02-20

## 2022-02-19 RX ORDER — AMLODIPINE BESYLATE 5 MG/1
5 TABLET ORAL DAILY
Status: DISCONTINUED | OUTPATIENT
Start: 2022-02-20 | End: 2022-02-21

## 2022-02-19 RX ORDER — SODIUM CHLORIDE 0.9 % (FLUSH) 0.9 %
10 SYRINGE (ML) INJECTION EVERY 12 HOURS PRN
Status: DISCONTINUED | OUTPATIENT
Start: 2022-02-19 | End: 2022-02-21 | Stop reason: HOSPADM

## 2022-02-19 RX ORDER — INSULIN ASPART 100 [IU]/ML
0-5 INJECTION, SOLUTION INTRAVENOUS; SUBCUTANEOUS
Status: DISCONTINUED | OUTPATIENT
Start: 2022-02-19 | End: 2022-02-21 | Stop reason: HOSPADM

## 2022-02-19 RX ORDER — TALC
6 POWDER (GRAM) TOPICAL NIGHTLY PRN
Status: DISCONTINUED | OUTPATIENT
Start: 2022-02-19 | End: 2022-02-21 | Stop reason: HOSPADM

## 2022-02-19 RX ORDER — ONDANSETRON 2 MG/ML
4 INJECTION INTRAMUSCULAR; INTRAVENOUS
Status: COMPLETED | OUTPATIENT
Start: 2022-02-19 | End: 2022-02-19

## 2022-02-19 RX ORDER — PEN NEEDLE, DIABETIC 32GX 5/32"
NEEDLE, DISPOSABLE MISCELLANEOUS 2 TIMES DAILY
COMMUNITY
Start: 2021-10-25

## 2022-02-19 RX ORDER — IBUPROFEN 200 MG
16 TABLET ORAL
Status: DISCONTINUED | OUTPATIENT
Start: 2022-02-19 | End: 2022-02-21 | Stop reason: HOSPADM

## 2022-02-19 RX ORDER — ALLOPURINOL 300 MG/1
300 TABLET ORAL
COMMUNITY
Start: 2021-06-23 | End: 2022-02-19

## 2022-02-19 RX ORDER — AMLODIPINE AND BENAZEPRIL HYDROCHLORIDE 5; 20 MG/1; MG/1
1 CAPSULE ORAL DAILY
Status: DISCONTINUED | OUTPATIENT
Start: 2022-02-20 | End: 2022-02-19

## 2022-02-19 RX ORDER — SILDENAFIL 50 MG/1
50 TABLET, FILM COATED ORAL
COMMUNITY
Start: 2021-06-23

## 2022-02-19 RX ORDER — SODIUM CHLORIDE, SODIUM LACTATE, POTASSIUM CHLORIDE, CALCIUM CHLORIDE 600; 310; 30; 20 MG/100ML; MG/100ML; MG/100ML; MG/100ML
INJECTION, SOLUTION INTRAVENOUS CONTINUOUS
Status: DISCONTINUED | OUTPATIENT
Start: 2022-02-19 | End: 2022-02-21 | Stop reason: HOSPADM

## 2022-02-19 RX ORDER — FLASH GLUCOSE SENSOR
KIT MISCELLANEOUS
COMMUNITY
Start: 2021-12-06

## 2022-02-19 RX ORDER — NALOXONE HCL 0.4 MG/ML
0.02 VIAL (ML) INJECTION
Status: DISCONTINUED | OUTPATIENT
Start: 2022-02-19 | End: 2022-02-21 | Stop reason: HOSPADM

## 2022-02-19 RX ORDER — GLUCAGON 1 MG
1 KIT INJECTION
Status: DISCONTINUED | OUTPATIENT
Start: 2022-02-19 | End: 2022-02-21 | Stop reason: HOSPADM

## 2022-02-19 RX ORDER — LISINOPRIL 20 MG/1
20 TABLET ORAL DAILY
Status: DISCONTINUED | OUTPATIENT
Start: 2022-02-20 | End: 2022-02-21

## 2022-02-19 RX ORDER — IBUPROFEN 200 MG
24 TABLET ORAL
Status: DISCONTINUED | OUTPATIENT
Start: 2022-02-19 | End: 2022-02-21 | Stop reason: HOSPADM

## 2022-02-19 RX ADMIN — ENOXAPARIN SODIUM 40 MG: 100 INJECTION SUBCUTANEOUS at 09:02

## 2022-02-19 RX ADMIN — SODIUM CHLORIDE, SODIUM LACTATE, POTASSIUM CHLORIDE, AND CALCIUM CHLORIDE: .6; .31; .03; .02 INJECTION, SOLUTION INTRAVENOUS at 10:02

## 2022-02-19 RX ADMIN — ONDANSETRON 4 MG: 2 INJECTION INTRAMUSCULAR; INTRAVENOUS at 05:02

## 2022-02-19 NOTE — LETTER
February 21, 2022         [address] [place] [address] [place] [address]  Phone: [phone]  Fax: [phone]       Patient: Jerel Serra   YOB: 1965  Date of Visit: 02/21/2022    To Whom It May Concern:    Jerel Serra  was at [facility] from 2/19/2022 to 02/21/2022. If you have any questions or concerns, or if I can be of further assistance, please do not hesitate to contact me.    Sincerely,    Kathleen Artis, NP

## 2022-02-19 NOTE — ED PROVIDER NOTES
"Encounter Date: 2/19/2022       History     Chief Complaint   Patient presents with    Abdominal Pain     Started at 0200 and had x1 emesis today; 7/10, generalized, abd pain.      56 yr old male presents to the ER with reports of abd pain, weakness and vomiting times 1. Pt states his symptoms began yesterday however he felt the worsening of symptoms with nausea around 2 am. Denies diarrhea. No hematemesis. No fever. No testicle pain or swelling. Denies dysuria however reports some 'dark colored urine". PMH of gout, DM, HTN.     The history is provided by the patient. No  was used.     Review of patient's allergies indicates:   Allergen Reactions    Aspirin      Past Medical History:   Diagnosis Date    Diabetes mellitus     Gout     Gout attack     Hypertension      No past surgical history on file.  Family History   Problem Relation Age of Onset    Hypertension Mother     Diabetes Father      Social History     Tobacco Use    Smoking status: Never Smoker    Smokeless tobacco: Never Used   Substance Use Topics    Alcohol use: Yes     Comment: occasional    Drug use: No     Review of Systems   Constitutional: Negative for fever.   Respiratory: Negative for shortness of breath.    Cardiovascular: Negative for chest pain.   Gastrointestinal: Positive for nausea and vomiting.   Genitourinary: Negative for dysuria, penile pain, penile swelling, scrotal swelling and testicular pain.   Musculoskeletal: Negative for neck pain and neck stiffness.   Skin: Negative for rash and wound.       Physical Exam     Initial Vitals [02/19/22 1626]   BP Pulse Resp Temp SpO2   (S) (!) 182/110 80 20 97.8 °F (36.6 °C) 99 %      MAP       --         Physical Exam    Constitutional: He appears well-developed and well-nourished. He is not diaphoretic. No distress.   HENT:   Head: Normocephalic and atraumatic.   Right Ear: Hearing and tympanic membrane normal.   Left Ear: Hearing and tympanic membrane normal. "   Nose: Nose normal.   Mouth/Throat: Uvula is midline, oropharynx is clear and moist and mucous membranes are normal.   Eyes: Lids are normal. Pupils are equal, round, and reactive to light. Scleral icterus is present.   Cardiovascular: Normal rate.   Pulmonary/Chest: Effort normal and breath sounds normal. No respiratory distress. He has no wheezes. He has no rhonchi.   Abdominal: Abdomen is soft. There is no abdominal tenderness. There is no rebound and no guarding.   Musculoskeletal:         General: Normal range of motion.      Cervical back: No rigidity.     Neurological: He is oriented to person, place, and time.   Skin: Skin is warm and dry. No rash noted.   Jaundice   Psychiatric: He has a normal mood and affect. His behavior is normal. Judgment and thought content normal.         ED Course   Procedures  Labs Reviewed   COMPREHENSIVE METABOLIC PANEL - Abnormal; Notable for the following components:       Result Value    Glucose 144 (*)     Calcium 8.5 (*)     Total Bilirubin 4.6 (*)     Alkaline Phosphatase 192 (*)     AST 1,561 (*)     ALT 3,320 (*)     All other components within normal limits   CBC W/ AUTO DIFFERENTIAL - Abnormal; Notable for the following components:    RDW 16.7 (*)     Gran % 31.0 (*)     Lymph % 58.0 (*)     All other components within normal limits   URINALYSIS, REFLEX TO URINE CULTURE - Abnormal; Notable for the following components:    Protein, UA 1+ (*)     Bilirubin (UA) 2+ (*)     Urobilinogen, UA >=8.0 (*)     All other components within normal limits    Narrative:     Specimen Source->Urine   ACETAMINOPHEN LEVEL - Abnormal; Notable for the following components:    Acetaminophen (Tylenol), Serum <3.0 (*)     All other components within normal limits   POCT GLUCOSE - Abnormal; Notable for the following components:    POCT Glucose 130 (*)     All other components within normal limits   LIPASE   URINALYSIS MICROSCOPIC    Narrative:     Specimen Source->Urine   ACETAMINOPHEN LEVEL    HEPATITIS PANEL, ACUTE   HEPATITIS PANEL, ACUTE   POCT GLUCOSE MONITORING CONTINUOUS          Imaging Results          US Abdomen Limited (Gallbladder) (Final result)  Result time 02/19/22 19:42:35    Final result by Sudeep Degroot MD (02/19/22 19:42:35)                 Impression:      The gallbladder is decompressed, likely accounting for wall thickening noting no secondary findings to suggest acute cholecystitis.  Given the hepatic parenchyma is somewhat heterogeneous, gallbladder findings could relate to superimposed hepatic disease, correlation with LFTs recommended.      Electronically signed by: Sudeep Degroot MD  Date:    02/19/2022  Time:    19:42             Narrative:    EXAMINATION:  US ABDOMEN LIMITED    CLINICAL HISTORY:  Upper abdominal pain, unspecified    TECHNIQUE:  Limited ultrasound of the right upper quadrant of the abdomen (including pancreas, liver, gallbladder, common bile duct, and spleen) was performed.    COMPARISON:  None.    FINDINGS:  The visualized portions of the pancreas are unremarkable noting the pancreas is for the most part obscured by overlying soft tissue structures.  The IVC is unremarkable.  The gallbladder is decompressed.  There is wall thickening measuring up to 6 mm.  No gallbladder wall hyperemia or pericholecystic fluid.  Sonographic Brewer sign is negative.  The common duct is not dilated measuring 0.3 cm.  The hepatic parenchyma is somewhat heterogeneous, the liver is not enlarged.  The visualized portions of the right kidney are unremarkable.  The spleen is unremarkable.  No ascites.                               X-Ray Abdomen Flat And Erect (Final result)  Result time 02/19/22 17:45:17    Final result by Sudeep Degroot MD (02/19/22 17:45:17)                 Impression:      1. Nonobstructive bowel gas pattern noting moderate stool in the colon.      Electronically signed by: Sudeep Degroot MD  Date:    02/19/2022  Time:    17:45             Narrative:     EXAMINATION:  XR ABDOMEN FLAT AND ERECT    CLINICAL HISTORY:  Unspecified abdominal pain    TECHNIQUE:  Flat and erect AP views of the abdomen were performed.    COMPARISON:  None    FINDINGS:  One upright view, 2 supine views.    No significant air-fluid levels on upright view.  Air and stool is seen within the large bowel and projected over the rectum.  There is moderate stool throughout the colon.  No focally dilated small bowel loops.  There are no calcifications to suggest nephrolithiasis or cholelithiasis.  No large volume free air or pneumatosis.  Left hip arthroplasty noted.                                 Medications   ondansetron injection 4 mg (4 mg Intravenous Given 2/19/22 1713)     Medical Decision Making:   Clinical Tests:   Lab Tests: Ordered and Reviewed  Radiological Study: Ordered and Reviewed             ED Course as of 02/19/22 2015   Sat Feb 19, 2022 2010 [facility] paged for admit.  [DT]   2014 Pt notified of the need for admission for what appears to be acute hepatitis. Pt is resting at this time in nad.  [DT]      ED Course User Index  [DT] Mary Berg NP             Clinical Impression:   Final diagnoses:  [R10.9, R11.10] Abdominal pain with vomiting  [R10.10] Upper abdominal pain  [R74.8] Elevated liver enzymes (Primary)  [R17] Jaundice          ED Disposition Condition    Observation               Mary Berg NP  02/19/22 2015

## 2022-02-20 LAB
ALBUMIN SERPL BCP-MCNC: 3.2 G/DL (ref 3.5–5.2)
ALP SERPL-CCNC: 157 U/L (ref 55–135)
ALT SERPL W/O P-5'-P-CCNC: 3221 U/L (ref 10–44)
ANION GAP SERPL CALC-SCNC: 8 MMOL/L (ref 8–16)
ANISOCYTOSIS BLD QL SMEAR: SLIGHT
AST SERPL-CCNC: 1514 U/L (ref 10–40)
BASOPHILS # BLD AUTO: ABNORMAL K/UL (ref 0–0.2)
BASOPHILS NFR BLD: 2 % (ref 0–1.9)
BILIRUB SERPL-MCNC: 4.6 MG/DL (ref 0.1–1)
BUN SERPL-MCNC: 11 MG/DL (ref 6–20)
CALCIUM SERPL-MCNC: 8.6 MG/DL (ref 8.7–10.5)
CHLORIDE SERPL-SCNC: 104 MMOL/L (ref 95–110)
CO2 SERPL-SCNC: 26 MMOL/L (ref 23–29)
CREAT SERPL-MCNC: 0.9 MG/DL (ref 0.5–1.4)
DIFFERENTIAL METHOD: ABNORMAL
EOSINOPHIL # BLD AUTO: ABNORMAL K/UL (ref 0–0.5)
EOSINOPHIL NFR BLD: 5 % (ref 0–8)
ERYTHROCYTE [DISTWIDTH] IN BLOOD BY AUTOMATED COUNT: 17.2 % (ref 11.5–14.5)
EST. GFR  (AFRICAN AMERICAN): >60 ML/MIN/1.73 M^2
EST. GFR  (NON AFRICAN AMERICAN): >60 ML/MIN/1.73 M^2
ESTIMATED AVG GLUCOSE: 180 MG/DL (ref 68–131)
GLUCOSE SERPL-MCNC: 142 MG/DL (ref 70–110)
HBA1C MFR BLD: 7.9 % (ref 4–5.6)
HCT VFR BLD AUTO: 45.8 % (ref 40–54)
HGB BLD-MCNC: 14.9 G/DL (ref 14–18)
IMM GRANULOCYTES # BLD AUTO: ABNORMAL K/UL (ref 0–0.04)
IMM GRANULOCYTES NFR BLD AUTO: ABNORMAL % (ref 0–0.5)
LYMPHOCYTES # BLD AUTO: ABNORMAL K/UL (ref 1–4.8)
LYMPHOCYTES NFR BLD: 63 % (ref 18–48)
MAGNESIUM SERPL-MCNC: 1.9 MG/DL (ref 1.6–2.6)
MCH RBC QN AUTO: 29.8 PG (ref 27–31)
MCHC RBC AUTO-ENTMCNC: 32.5 G/DL (ref 32–36)
MCV RBC AUTO: 92 FL (ref 82–98)
METAMYELOCYTES NFR BLD MANUAL: 1 %
MONOCYTES # BLD AUTO: ABNORMAL K/UL (ref 0.3–1)
MONOCYTES NFR BLD: 10 % (ref 4–15)
NEUTROPHILS NFR BLD: 19 % (ref 38–73)
NRBC BLD-RTO: 0 /100 WBC
OVALOCYTES BLD QL SMEAR: ABNORMAL
PLATELET # BLD AUTO: 175 K/UL (ref 150–450)
PLATELET BLD QL SMEAR: ABNORMAL
PMV BLD AUTO: 12.5 FL (ref 9.2–12.9)
POCT GLUCOSE: 128 MG/DL (ref 70–110)
POCT GLUCOSE: 133 MG/DL (ref 70–110)
POCT GLUCOSE: 138 MG/DL (ref 70–110)
POCT GLUCOSE: 161 MG/DL (ref 70–110)
POIKILOCYTOSIS BLD QL SMEAR: SLIGHT
POTASSIUM SERPL-SCNC: 3.5 MMOL/L (ref 3.5–5.1)
PROT SERPL-MCNC: 6.7 G/DL (ref 6–8.4)
RBC # BLD AUTO: 5 M/UL (ref 4.6–6.2)
SODIUM SERPL-SCNC: 138 MMOL/L (ref 136–145)
WBC # BLD AUTO: 4.03 K/UL (ref 3.9–12.7)

## 2022-02-20 PROCEDURE — 85007 BL SMEAR W/DIFF WBC COUNT: CPT | Performed by: NURSE PRACTITIONER

## 2022-02-20 PROCEDURE — 36415 COLL VENOUS BLD VENIPUNCTURE: CPT | Performed by: NURSE PRACTITIONER

## 2022-02-20 PROCEDURE — 25000003 PHARM REV CODE 250: Performed by: EMERGENCY MEDICINE

## 2022-02-20 PROCEDURE — 96366 THER/PROPH/DIAG IV INF ADDON: CPT | Performed by: EMERGENCY MEDICINE

## 2022-02-20 PROCEDURE — G0378 HOSPITAL OBSERVATION PER HR: HCPCS

## 2022-02-20 PROCEDURE — 25000003 PHARM REV CODE 250: Performed by: NURSE PRACTITIONER

## 2022-02-20 PROCEDURE — 63600175 PHARM REV CODE 636 W HCPCS: Performed by: HOSPITALIST

## 2022-02-20 PROCEDURE — 99223 1ST HOSP IP/OBS HIGH 75: CPT | Mod: ,,, | Performed by: INTERNAL MEDICINE

## 2022-02-20 PROCEDURE — 86382 NEUTRALIZATION TEST VIRAL: CPT | Performed by: NURSE PRACTITIONER

## 2022-02-20 PROCEDURE — 85027 COMPLETE CBC AUTOMATED: CPT | Performed by: NURSE PRACTITIONER

## 2022-02-20 PROCEDURE — 83735 ASSAY OF MAGNESIUM: CPT | Performed by: NURSE PRACTITIONER

## 2022-02-20 PROCEDURE — 99223 PR INITIAL HOSPITAL CARE,LEVL III: ICD-10-PCS | Mod: ,,, | Performed by: INTERNAL MEDICINE

## 2022-02-20 PROCEDURE — 83036 HEMOGLOBIN GLYCOSYLATED A1C: CPT | Performed by: NURSE PRACTITIONER

## 2022-02-20 PROCEDURE — 25000242 PHARM REV CODE 250 ALT 637 W/ HCPCS: Performed by: INTERNAL MEDICINE

## 2022-02-20 PROCEDURE — 80053 COMPREHEN METABOLIC PANEL: CPT | Performed by: NURSE PRACTITIONER

## 2022-02-20 PROCEDURE — 80074 ACUTE HEPATITIS PANEL: CPT | Performed by: NURSE PRACTITIONER

## 2022-02-20 RX ORDER — ACETYLCYSTEINE 200 MG/ML
70 SOLUTION ORAL; RESPIRATORY (INHALATION) EVERY 4 HOURS
Status: DISCONTINUED | OUTPATIENT
Start: 2022-02-20 | End: 2022-02-21 | Stop reason: HOSPADM

## 2022-02-20 RX ORDER — ACETYLCYSTEINE 200 MG/ML
140 SOLUTION ORAL; RESPIRATORY (INHALATION) ONCE
Status: COMPLETED | OUTPATIENT
Start: 2022-02-20 | End: 2022-02-20

## 2022-02-20 RX ADMIN — LISINOPRIL 20 MG: 20 TABLET ORAL at 09:02

## 2022-02-20 RX ADMIN — SODIUM CHLORIDE, SODIUM LACTATE, POTASSIUM CHLORIDE, AND CALCIUM CHLORIDE: .6; .31; .03; .02 INJECTION, SOLUTION INTRAVENOUS at 07:02

## 2022-02-20 RX ADMIN — ACETYLCYSTEINE 8200 MG: 200 SOLUTION ORAL; RESPIRATORY (INHALATION) at 06:02

## 2022-02-20 RX ADMIN — ACETYLCYSTEINE 16400 MG: 200 SOLUTION ORAL; RESPIRATORY (INHALATION) at 03:02

## 2022-02-20 RX ADMIN — ALLOPURINOL 300 MG: 100 TABLET ORAL at 09:02

## 2022-02-20 RX ADMIN — AMLODIPINE BESYLATE 5 MG: 5 TABLET ORAL at 09:02

## 2022-02-20 RX ADMIN — ACETYLCYSTEINE 8200 MG: 200 SOLUTION ORAL; RESPIRATORY (INHALATION) at 09:02

## 2022-02-20 NOTE — HPI
Jerel Serra is a 57 yo male with a pmh of HTN, DM2, gout. He presented with fatigue, N/V, and dark urine. He states he has been tired since Thursday and took off work on Friday. He became nauseous early Saturday morning and vomited once. He then noticed his urine was darker so he decided to come to the hospital. In the ED, he was found to have elevated LFTs. Total bili 4.6, alk phos 192, AST 1561, ALT 3320. He denies drinking alcohol more than socially. He denies IV drug use. COVID negative.

## 2022-02-20 NOTE — SUBJECTIVE & OBJECTIVE
"Past Medical History:   Diagnosis Date    Diabetes mellitus     Gout     Gout attack     Hypertension        No past surgical history on file.    Review of patient's allergies indicates:   Allergen Reactions    Aspirin        No current facility-administered medications on file prior to encounter.     Current Outpatient Medications on File Prior to Encounter   Medication Sig    allopurinol (ZYLOPRIM) 300 MG tablet Take 300 mg by mouth once daily.    amlodipine-benazepril 5-20 mg (LOTREL) 5-20 mg per capsule     insulin glargine 100 units/mL (3mL) SubQ pen Inject 10 Units into the skin.    metFORMIN (GLUCOPHAGE) 500 MG tablet 500 mg 2 (two) times a day.    multivitamin with minerals tablet Take 1 tablet by mouth once daily.    BD CASSIDY 2ND GEN PEN NEEDLE 32 gauge x 5/32" Ndle 2 (two) times a day.    cetirizine (ZYRTEC) 10 MG tablet Take 1 tablet (10 mg total) by mouth once daily.    fluticasone propionate (FLONASE) 50 mcg/actuation nasal spray SHAKE LIQUID AND USE 1 SPRAY IN EACH NOSTRIL TWICE DAILY    [employer] TAPAN 14 DAY SENSOR Kit Apply topically every 14 (fourteen) days.    sildenafiL (VIAGRA) 50 MG tablet Take 50 mg by mouth.     Family History       Problem Relation (Age of Onset)    Diabetes Father    Hypertension Mother          Tobacco Use    Smoking status: Never Smoker    Smokeless tobacco: Never Used   Substance and Sexual Activity    Alcohol use: Yes     Comment: occasional    Drug use: No    Sexual activity: Yes     Review of Systems   Constitutional:  Positive for fatigue. Negative for appetite change, chills and fever.   HENT:  Negative for congestion.    Eyes:  Negative for visual disturbance.   Respiratory:  Negative for cough and shortness of breath.    Cardiovascular:  Negative for chest pain and palpitations.   Gastrointestinal:  Positive for nausea and vomiting. Negative for abdominal pain.   Genitourinary:  Negative for dysuria.        Dark urine   Musculoskeletal:  Negative for arthralgias " and myalgias.   Skin:  Negative for wound.   Neurological:  Negative for weakness and headaches.   Psychiatric/Behavioral:  Negative for confusion.    Objective:     Vital Signs (Most Recent):  Temp: 98.3 °F (36.8 °C) (02/19/22 2340)  Pulse: 76 (02/19/22 2230)  Resp: 18 (02/19/22 2340)  BP: (!) 154/65 (02/19/22 2340)  SpO2: 100 % (02/19/22 2340)   Vital Signs (24h Range):  Temp:  [97.1 °F (36.2 °C)-98.4 °F (36.9 °C)] 98.3 °F (36.8 °C)  Pulse:  [61-92] 76  Resp:  [13-25] 18  SpO2:  [96 %-100 %] 100 %  BP: (133-183)/() 154/65     Weight: 114.5 kg (252 lb 6.8 oz)  Body mass index is 34.24 kg/m².    Physical Exam  Vitals and nursing note reviewed.   Constitutional:       General: He is not in acute distress.     Appearance: Normal appearance. He is not ill-appearing.   HENT:      Head: Normocephalic and atraumatic.      Nose: Nose normal.      Mouth/Throat:      Mouth: Mucous membranes are moist.   Eyes:      General: Scleral icterus present.      Pupils: Pupils are equal, round, and reactive to light.   Cardiovascular:      Rate and Rhythm: Normal rate and regular rhythm.      Pulses: Normal pulses.      Heart sounds: Normal heart sounds.   Pulmonary:      Effort: Pulmonary effort is normal.      Breath sounds: Normal breath sounds.   Abdominal:      General: Bowel sounds are normal.      Palpations: Abdomen is soft.   Musculoskeletal:         General: No swelling. Normal range of motion.      Cervical back: Normal range of motion.   Skin:     General: Skin is warm and dry.   Neurological:      Mental Status: He is alert and oriented to person, place, and time.      Motor: No weakness.   Psychiatric:         Behavior: Behavior normal.         Thought Content: Thought content normal.         CRANIAL NERVES     CN III, IV, VI   Pupils are equal, round, and reactive to light.     Significant Labs: All pertinent labs within the past 24 hours have been reviewed.    Significant Imaging: I have reviewed all pertinent  imaging results/findings within the past 24 hours.

## 2022-02-20 NOTE — PLAN OF CARE
Problem: Adult Inpatient Plan of Care  Goal: Plan of Care Review  Outcome: Ongoing, Progressing   Pt AAOX4. Safety precautions maintained. Bed low and locked, call light within reach. Medications administered per MAR. Pt tolerating diet well. IVF infusing. Acetylcysteine administered per orders. DVT prophylaxis continued. Hourly rounding performed. Encouraged to call for OOB assistance. Awaiting pt disposition. Pt updated on plan of care and verbalizes understanding.

## 2022-02-20 NOTE — PLAN OF CARE
Patient is AAOx4 on room air. LR infusing to the RFA at 125ml/hr. Diner tray was served upon admit to the unit.BG was checked and it is WDNL. VSS. Safety was maintained.  Problem: Adult Inpatient Plan of Care  Goal: Patient-Specific Goal (Individualized)  Outcome: Ongoing, Progressing     Problem: Adult Inpatient Plan of Care  Goal: Absence of Hospital-Acquired Illness or Injury  Outcome: Ongoing, Progressing     Problem: Adult Inpatient Plan of Care  Goal: Readiness for Transition of Care  Outcome: Ongoing, Progressing

## 2022-02-20 NOTE — CONSULTS
[facility]  Gastroenterology  Consult Note    Patient Name: Jerel Serra  MRN: [id number]  Admission Date: 2/19/2022  Hospital Length of Stay: 0 days  Code Status: Full Code   Attending Provider: Rianna Covington MD   Consulting Provider: Siri Lindquist MD  Primary Care Physician: Wiley Piedra MD  Principal Problem:Elevated liver enzymes    Gastroenterology  Consult performed by: Siri Lindquist MD  Consult ordered by: Kathleen Artis NP  Reason for consult: Elevated LFTs  Assessment/Recommendations: Please see A/P below        Subjective:     HPI:  57 yo M admitted with abdominal pain found to have elevated LFTs.  He denies IV drug use, frequent alcohol use, or sex out of his marriage.  He admits to frequent large doses of Tylenol for an extensive period prior to admission, stopping the medication just prior to admission due to not feeling well.  Since admission, he states that he is feeling better and denies complaints.      Past Medical History:   Diagnosis Date    Diabetes mellitus     Gout     Gout attack     Hypertension        No past surgical history on file.    Review of patient's allergies indicates:   Allergen Reactions    Aspirin      Family History       Problem Relation (Age of Onset)    Diabetes Father    Hypertension Mother          Tobacco Use    Smoking status: Never Smoker    Smokeless tobacco: Never Used   Substance and Sexual Activity    Alcohol use: Yes     Comment: occasional    Drug use: No    Sexual activity: Yes     Review of Systems   Constitutional:  Negative for appetite change and unexpected weight change.   Eyes:  Negative for photophobia and visual disturbance.   Respiratory:  Negative for chest tightness, shortness of breath and wheezing.    Cardiovascular:  Negative for chest pain, palpitations and leg swelling.   Genitourinary:  Negative for dysuria, flank pain and hematuria.   Musculoskeletal:  Negative for joint swelling and myalgias.   Skin:   Negative for color change and rash.   Neurological:  Negative for dizziness and speech difficulty.   Psychiatric/Behavioral:  Negative for confusion and hallucinations.    Objective:     Vital Signs (Most Recent):  Temp: 98.2 °F (36.8 °C) (02/20/22 1206)  Pulse: 66 (02/20/22 1206)  Resp: 18 (02/20/22 1206)  BP: (!) 173/103 (02/20/22 1206)  SpO2: 97 % (02/20/22 0755)   Vital Signs (24h Range):  Temp:  [97.1 °F (36.2 °C)-98.4 °F (36.9 °C)] 98.2 °F (36.8 °C)  Pulse:  [54-92] 66  Resp:  [13-25] 18  SpO2:  [96 %-100 %] 97 %  BP: (133-183)/() 173/103     Weight: 117.2 kg (258 lb 6.1 oz) (02/20/22 0443)  Body mass index is 35.04 kg/m².      Intake/Output Summary (Last 24 hours) at 2/20/2022 1339  Last data filed at 2/20/2022 0500  Gross per 24 hour   Intake --   Output 400 ml   Net -400 ml       Lines/Drains/Airways       Peripheral Intravenous Line  Duration                  Peripheral IV - Single Lumen 02/19/22 1656 20 G Right Forearm <1 day                    Physical Exam  Constitutional:       Appearance: Normal appearance. He is well-developed. He is not ill-appearing.   HENT:      Head: Normocephalic and atraumatic.   Eyes:      Extraocular Movements: Extraocular movements intact.      Pupils: Pupils are equal, round, and reactive to light.   Cardiovascular:      Rate and Rhythm: Normal rate and regular rhythm.   Pulmonary:      Effort: Pulmonary effort is normal. No respiratory distress.   Abdominal:      General: There is no distension.      Palpations: Abdomen is soft.      Tenderness: There is no abdominal tenderness.   Musculoskeletal:         General: No deformity. Normal range of motion.      Cervical back: Normal range of motion and neck supple.   Skin:     General: Skin is warm and dry.   Neurological:      General: No focal deficit present.      Mental Status: He is alert and oriented to person, place, and time.   Psychiatric:         Mood and Affect: Mood normal.         Behavior: Behavior normal.       Comments: Happy and smiling       Significant Labs:  Blood Culture: No results for input(s): LABBLOO in the last 48 hours.  CBC:   Recent Labs   Lab 02/19/22  1655 02/20/22  0602   WBC 4.24 4.03   HGB 15.5 14.9   HCT 46.2 45.8    175     CMP:   Recent Labs   Lab 02/20/22  0602   *   CALCIUM 8.6*   ALBUMIN 3.2*   PROT 6.7      K 3.5   CO2 26      BUN 11   CREATININE 0.9   ALKPHOS 157*   ALT 3,221*   AST 1,514*   BILITOT 4.6*     Coagulation:   Recent Labs   Lab 02/19/22  2119   INR 1.8*       Significant Imaging:  Imaging results within the past 24 hours have been reviewed.    Assessment/Plan:     * Elevated liver enzymes  - This very much could be due to Tylenol (the tylenol level does not mean anything in chronic users of tylenol) therefore he should receive NAC.  - Other options for source are ischemia, which is highly unlikely, and viruses which is also less likely than Tylenol.  - INR added to daily labs.  If labs are trending down tomorrow, mental status is still good, and INR is also trending down, he should be able to go home with strict instructions not to drink alcohol, taken NSAIDS or tylenol, and no herbals, with close f/u with hepatology.  - Stop allopurinol as it can cause drug induced liver injury        Thank you for your consult. The GI team will follow-up with patient. Please contact us if you have any additional questions.    Siri Lindquist MD  Gastroenterology  [facility]

## 2022-02-20 NOTE — H&P
"[facility] Medicine  History & Physical    Patient Name: Jerel Serra  MRN: [id number]  Patient Class: OP- Observation  Admission Date: 2/19/2022  Attending Physician: Keshawn Rodrigues, *   Primary Care Provider: Wiley Piedra MD         Patient information was obtained from patient, past medical records and ER records.     Subjective:     Principal Problem:Elevated liver enzymes    Chief Complaint:   Chief Complaint   Patient presents with    Abdominal Pain     Started at 0200 and had x1 emesis today; 7/10, generalized, abd pain.         HPI: Jerel Serra is a 57 yo male with a pmh of HTN, DM2, gout. He presented with fatigue, N/V, and dark urine. He states he has been tired since Thursday and took off work on Friday. He became nauseous early Saturday morning and vomited once. He then noticed his urine was darker so he decided to come to the hospital. In the ED, he was found to have elevated LFTs. Total bili 4.6, alk phos 192, AST 1561, ALT 3320. He denies drinking alcohol more than socially. He denies IV drug use. COVID negative.       Past Medical History:   Diagnosis Date    Diabetes mellitus     Gout     Gout attack     Hypertension        No past surgical history on file.    Review of patient's allergies indicates:   Allergen Reactions    Aspirin        No current facility-administered medications on file prior to encounter.     Current Outpatient Medications on File Prior to Encounter   Medication Sig    allopurinol (ZYLOPRIM) 300 MG tablet Take 300 mg by mouth once daily.    amlodipine-benazepril 5-20 mg (LOTREL) 5-20 mg per capsule     insulin glargine 100 units/mL (3mL) SubQ pen Inject 10 Units into the skin.    metFORMIN (GLUCOPHAGE) 500 MG tablet 500 mg 2 (two) times a day.    multivitamin with minerals tablet Take 1 tablet by mouth once daily.    BD CASSIDY 2ND GEN PEN NEEDLE 32 gauge x 5/32" Ndle 2 (two) times a day.    cetirizine (ZYRTEC) 10 MG tablet Take 1 " tablet (10 mg total) by mouth once daily.    fluticasone propionate (FLONASE) 50 mcg/actuation nasal spray SHAKE LIQUID AND USE 1 SPRAY IN EACH NOSTRIL TWICE DAILY    [employer] TAPAN 14 DAY SENSOR Kit Apply topically every 14 (fourteen) days.    sildenafiL (VIAGRA) 50 MG tablet Take 50 mg by mouth.     Family History       Problem Relation (Age of Onset)    Diabetes Father    Hypertension Mother          Tobacco Use    Smoking status: Never Smoker    Smokeless tobacco: Never Used   Substance and Sexual Activity    Alcohol use: Yes     Comment: occasional    Drug use: No    Sexual activity: Yes     Review of Systems   Constitutional:  Positive for fatigue. Negative for appetite change, chills and fever.   HENT:  Negative for congestion.    Eyes:  Negative for visual disturbance.   Respiratory:  Negative for cough and shortness of breath.    Cardiovascular:  Negative for chest pain and palpitations.   Gastrointestinal:  Positive for nausea and vomiting. Negative for abdominal pain.   Genitourinary:  Negative for dysuria.        Dark urine   Musculoskeletal:  Negative for arthralgias and myalgias.   Skin:  Negative for wound.   Neurological:  Negative for weakness and headaches.   Psychiatric/Behavioral:  Negative for confusion.    Objective:     Vital Signs (Most Recent):  Temp: 98.3 °F (36.8 °C) (02/19/22 2340)  Pulse: 76 (02/19/22 2230)  Resp: 18 (02/19/22 2340)  BP: (!) 154/65 (02/19/22 2340)  SpO2: 100 % (02/19/22 2340)   Vital Signs (24h Range):  Temp:  [97.1 °F (36.2 °C)-98.4 °F (36.9 °C)] 98.3 °F (36.8 °C)  Pulse:  [61-92] 76  Resp:  [13-25] 18  SpO2:  [96 %-100 %] 100 %  BP: (133-183)/() 154/65     Weight: 114.5 kg (252 lb 6.8 oz)  Body mass index is 34.24 kg/m².    Physical Exam  Vitals and nursing note reviewed.   Constitutional:       General: He is not in acute distress.     Appearance: Normal appearance. He is not ill-appearing.   HENT:      Head: Normocephalic and atraumatic.      Nose:  Nose normal.      Mouth/Throat:      Mouth: Mucous membranes are moist.   Eyes:      General: Scleral icterus present.      Pupils: Pupils are equal, round, and reactive to light.   Cardiovascular:      Rate and Rhythm: Normal rate and regular rhythm.      Pulses: Normal pulses.      Heart sounds: Normal heart sounds.   Pulmonary:      Effort: Pulmonary effort is normal.      Breath sounds: Normal breath sounds.   Abdominal:      General: Bowel sounds are normal.      Palpations: Abdomen is soft.   Musculoskeletal:         General: No swelling. Normal range of motion.      Cervical back: Normal range of motion.   Skin:     General: Skin is warm and dry.   Neurological:      Mental Status: He is alert and oriented to person, place, and time.      Motor: No weakness.   Psychiatric:         Behavior: Behavior normal.         Thought Content: Thought content normal.         CRANIAL NERVES     CN III, IV, VI   Pupils are equal, round, and reactive to light.     Significant Labs: All pertinent labs within the past 24 hours have been reviewed.    Significant Imaging: I have reviewed all pertinent imaging results/findings within the past 24 hours.    Assessment/Plan:     * Elevated liver enzymes  -hepatitis panel pending  acetaminophen level <3  -check ammonia, INR, ethanol, CK levels  -consult GI  -IV fluids  -daily CMP      Type 2 diabetes mellitus with hyperglycemia  Hold metformin and home insulin   -accuchecks and SSI  -check A1C      Pure hypercholesterolemia  Not on statin        Chronic gout  Cont allopurinol      HTN (hypertension)  -cont lotrel        VTE Risk Mitigation (From admission, onward)         Ordered     enoxaparin injection 40 mg  Daily         02/19/22 2044     IP VTE HIGH RISK PATIENT  Once         02/19/22 2044     Place sequential compression device  Until discontinued         02/19/22 2044                   Kathleen Artis NP  Department of Hospital Medicine   [facility]

## 2022-02-20 NOTE — ED NOTES
5833 Report given to Arlin Flores RN. Patient is alert, awake and oriented x4, sitting in bed. Patient denies any current complaints of pain, reports pain as 0 out of 10 on numeric scale. Provider at bedside, patient appropriately responding and engage in questions. Patient is stable and ready to be transported to the floor, room 518A.

## 2022-02-20 NOTE — ED NOTES
Patient is alert, awake and oriented x4. Bilateral breath sounds are clear, equal and unlabored, generalized bilateral lower extremities non-pitting edema noted. Patient denies any current complaints of pain. 20 gauge to right forearm. Patient instructed to use call light if needs to get up. Call light placed in reach, bed in lowest position, one side rail is up. Patient is laying bed watching television.

## 2022-02-20 NOTE — ASSESSMENT & PLAN NOTE
-hepatitis panel pending  acetaminophen level <3  -check ammonia, INR, ethanol, CK levels  -consult GI  -IV fluids  -daily CMP

## 2022-02-20 NOTE — HPI
55 yo M admitted with abdominal pain found to have elevated LFTs.  He denies IV drug use, frequent alcohol use, or sex out of his marriage.  He admits to frequent large doses of Tylenol for an extensive period prior to admission, stopping the medication just prior to admission due to not feeling well.  Since admission, he states that he is feeling better and denies complaints.

## 2022-02-20 NOTE — SUBJECTIVE & OBJECTIVE
Past Medical History:   Diagnosis Date    Diabetes mellitus     Gout     Gout attack     Hypertension        No past surgical history on file.    Review of patient's allergies indicates:   Allergen Reactions    Aspirin      Family History       Problem Relation (Age of Onset)    Diabetes Father    Hypertension Mother          Tobacco Use    Smoking status: Never Smoker    Smokeless tobacco: Never Used   Substance and Sexual Activity    Alcohol use: Yes     Comment: occasional    Drug use: No    Sexual activity: Yes     Review of Systems   Constitutional:  Negative for appetite change and unexpected weight change.   Eyes:  Negative for photophobia and visual disturbance.   Respiratory:  Negative for chest tightness, shortness of breath and wheezing.    Cardiovascular:  Negative for chest pain, palpitations and leg swelling.   Genitourinary:  Negative for dysuria, flank pain and hematuria.   Musculoskeletal:  Negative for joint swelling and myalgias.   Skin:  Negative for color change and rash.   Neurological:  Negative for dizziness and speech difficulty.   Psychiatric/Behavioral:  Negative for confusion and hallucinations.    Objective:     Vital Signs (Most Recent):  Temp: 98.2 °F (36.8 °C) (02/20/22 1206)  Pulse: 66 (02/20/22 1206)  Resp: 18 (02/20/22 1206)  BP: (!) 173/103 (02/20/22 1206)  SpO2: 97 % (02/20/22 0755)   Vital Signs (24h Range):  Temp:  [97.1 °F (36.2 °C)-98.4 °F (36.9 °C)] 98.2 °F (36.8 °C)  Pulse:  [54-92] 66  Resp:  [13-25] 18  SpO2:  [96 %-100 %] 97 %  BP: (133-183)/() 173/103     Weight: 117.2 kg (258 lb 6.1 oz) (02/20/22 0443)  Body mass index is 35.04 kg/m².      Intake/Output Summary (Last 24 hours) at 2/20/2022 1339  Last data filed at 2/20/2022 0500  Gross per 24 hour   Intake --   Output 400 ml   Net -400 ml       Lines/Drains/Airways       Peripheral Intravenous Line  Duration                  Peripheral IV - Single Lumen 02/19/22 1656 20 G Right Forearm <1 day                     Physical Exam  Constitutional:       Appearance: Normal appearance. He is well-developed. He is not ill-appearing.   HENT:      Head: Normocephalic and atraumatic.   Eyes:      Extraocular Movements: Extraocular movements intact.      Pupils: Pupils are equal, round, and reactive to light.   Cardiovascular:      Rate and Rhythm: Normal rate and regular rhythm.   Pulmonary:      Effort: Pulmonary effort is normal. No respiratory distress.   Abdominal:      General: There is no distension.      Palpations: Abdomen is soft.      Tenderness: There is no abdominal tenderness.   Musculoskeletal:         General: No deformity. Normal range of motion.      Cervical back: Normal range of motion and neck supple.   Skin:     General: Skin is warm and dry.   Neurological:      General: No focal deficit present.      Mental Status: He is alert and oriented to person, place, and time.   Psychiatric:         Mood and Affect: Mood normal.         Behavior: Behavior normal.      Comments: Happy and smiling       Significant Labs:  Blood Culture: No results for input(s): LABBLOO in the last 48 hours.  CBC:   Recent Labs   Lab 02/19/22  1655 02/20/22  0602   WBC 4.24 4.03   HGB 15.5 14.9   HCT 46.2 45.8    175     CMP:   Recent Labs   Lab 02/20/22  0602   *   CALCIUM 8.6*   ALBUMIN 3.2*   PROT 6.7      K 3.5   CO2 26      BUN 11   CREATININE 0.9   ALKPHOS 157*   ALT 3,221*   AST 1,514*   BILITOT 4.6*     Coagulation:   Recent Labs   Lab 02/19/22  2119   INR 1.8*       Significant Imaging:  Imaging results within the past 24 hours have been reviewed.

## 2022-02-20 NOTE — ASSESSMENT & PLAN NOTE
- This very much could be due to Tylenol (the tylenol level does not mean anything in chronic users of tylenol) therefore he should receive NAC.  - Other options for source are ischemia, which is highly unlikely, and viruses which is also less likely than Tylenol.  - INR added to daily labs.  If labs are trending down tomorrow, mental status is still good, and INR is also trending down, he should be able to go home with strict instructions not to drink alcohol, taken NSAIDS or tylenol, and no herbals, with close f/u with hepatology.  - Stop allopurinol as it can cause drug induced liver injury

## 2022-02-21 ENCOUNTER — TELEPHONE (OUTPATIENT)
Dept: GASTROENTEROLOGY | Facility: HOSPITAL | Age: 57
End: 2022-02-21
Payer: COMMERCIAL

## 2022-02-21 VITALS
OXYGEN SATURATION: 100 % | HEIGHT: 72 IN | RESPIRATION RATE: 18 BRPM | WEIGHT: 231.94 LBS | TEMPERATURE: 96 F | BODY MASS INDEX: 31.42 KG/M2 | DIASTOLIC BLOOD PRESSURE: 101 MMHG | SYSTOLIC BLOOD PRESSURE: 173 MMHG | HEART RATE: 77 BPM

## 2022-02-21 DIAGNOSIS — R74.8 ELEVATED LIVER ENZYMES: Primary | ICD-10-CM

## 2022-02-21 LAB
ALBUMIN SERPL BCP-MCNC: 3.2 G/DL (ref 3.5–5.2)
ALBUMIN SERPL BCP-MCNC: 3.4 G/DL (ref 3.5–5.2)
ALP SERPL-CCNC: 146 U/L (ref 55–135)
ALP SERPL-CCNC: 151 U/L (ref 55–135)
ALT SERPL W/O P-5'-P-CCNC: 3206 U/L (ref 10–44)
ALT SERPL W/O P-5'-P-CCNC: 3260 U/L (ref 10–44)
ANION GAP SERPL CALC-SCNC: 13 MMOL/L (ref 8–16)
ANION GAP SERPL CALC-SCNC: 8 MMOL/L (ref 8–16)
ANISOCYTOSIS BLD QL SMEAR: SLIGHT
AST SERPL-CCNC: 1263 U/L (ref 10–40)
AST SERPL-CCNC: 1349 U/L (ref 10–40)
BASOPHILS # BLD AUTO: ABNORMAL K/UL (ref 0–0.2)
BASOPHILS NFR BLD: 3 % (ref 0–1.9)
BILIRUB SERPL-MCNC: 4.7 MG/DL (ref 0.1–1)
BILIRUB SERPL-MCNC: 5 MG/DL (ref 0.1–1)
BUN SERPL-MCNC: 9 MG/DL (ref 6–20)
BUN SERPL-MCNC: 9 MG/DL (ref 6–20)
CALCIUM SERPL-MCNC: 8.8 MG/DL (ref 8.7–10.5)
CALCIUM SERPL-MCNC: 9 MG/DL (ref 8.7–10.5)
CHLORIDE SERPL-SCNC: 102 MMOL/L (ref 95–110)
CHLORIDE SERPL-SCNC: 103 MMOL/L (ref 95–110)
CO2 SERPL-SCNC: 24 MMOL/L (ref 23–29)
CO2 SERPL-SCNC: 29 MMOL/L (ref 23–29)
CREAT SERPL-MCNC: 0.9 MG/DL (ref 0.5–1.4)
CREAT SERPL-MCNC: 1 MG/DL (ref 0.5–1.4)
DIFFERENTIAL METHOD: ABNORMAL
EOSINOPHIL # BLD AUTO: ABNORMAL K/UL (ref 0–0.5)
EOSINOPHIL NFR BLD: 2 % (ref 0–8)
ERYTHROCYTE [DISTWIDTH] IN BLOOD BY AUTOMATED COUNT: 17 % (ref 11.5–14.5)
EST. GFR  (AFRICAN AMERICAN): >60 ML/MIN/1.73 M^2
EST. GFR  (AFRICAN AMERICAN): >60 ML/MIN/1.73 M^2
EST. GFR  (NON AFRICAN AMERICAN): >60 ML/MIN/1.73 M^2
EST. GFR  (NON AFRICAN AMERICAN): >60 ML/MIN/1.73 M^2
GLUCOSE SERPL-MCNC: 128 MG/DL (ref 70–110)
GLUCOSE SERPL-MCNC: 132 MG/DL (ref 70–110)
HCT VFR BLD AUTO: 45.8 % (ref 40–54)
HGB BLD-MCNC: 15.4 G/DL (ref 14–18)
IMM GRANULOCYTES # BLD AUTO: ABNORMAL K/UL (ref 0–0.04)
IMM GRANULOCYTES NFR BLD AUTO: ABNORMAL % (ref 0–0.5)
INR PPP: 1.6 (ref 0.8–1.2)
INR PPP: 1.9 (ref 0.8–1.2)
LYMPHOCYTES # BLD AUTO: ABNORMAL K/UL (ref 1–4.8)
LYMPHOCYTES NFR BLD: 59 % (ref 18–48)
MAGNESIUM SERPL-MCNC: 1.7 MG/DL (ref 1.6–2.6)
MCH RBC QN AUTO: 29.8 PG (ref 27–31)
MCHC RBC AUTO-ENTMCNC: 33.6 G/DL (ref 32–36)
MCV RBC AUTO: 89 FL (ref 82–98)
MONOCYTES # BLD AUTO: ABNORMAL K/UL (ref 0.3–1)
MONOCYTES NFR BLD: 10 % (ref 4–15)
NEUTROPHILS NFR BLD: 26 % (ref 38–73)
NRBC BLD-RTO: 0 /100 WBC
PLATELET # BLD AUTO: 168 K/UL (ref 150–450)
PLATELET BLD QL SMEAR: ABNORMAL
PMV BLD AUTO: 12.2 FL (ref 9.2–12.9)
POCT GLUCOSE: 124 MG/DL (ref 70–110)
POCT GLUCOSE: 179 MG/DL (ref 70–110)
POCT GLUCOSE: 183 MG/DL (ref 70–110)
POCT GLUCOSE: 206 MG/DL (ref 70–110)
POIKILOCYTOSIS BLD QL SMEAR: SLIGHT
POTASSIUM SERPL-SCNC: 4 MMOL/L (ref 3.5–5.1)
POTASSIUM SERPL-SCNC: 4 MMOL/L (ref 3.5–5.1)
PROT SERPL-MCNC: 6.9 G/DL (ref 6–8.4)
PROT SERPL-MCNC: 7.3 G/DL (ref 6–8.4)
PROTHROMBIN TIME: 16.3 SEC (ref 9–12.5)
PROTHROMBIN TIME: 18.6 SEC (ref 9–12.5)
RBC # BLD AUTO: 5.17 M/UL (ref 4.6–6.2)
SODIUM SERPL-SCNC: 139 MMOL/L (ref 136–145)
SODIUM SERPL-SCNC: 140 MMOL/L (ref 136–145)
WBC # BLD AUTO: 3.53 K/UL (ref 3.9–12.7)

## 2022-02-21 PROCEDURE — 96375 TX/PRO/DX INJ NEW DRUG ADDON: CPT | Performed by: EMERGENCY MEDICINE

## 2022-02-21 PROCEDURE — 85025 COMPLETE CBC W/AUTO DIFF WBC: CPT | Performed by: NURSE PRACTITIONER

## 2022-02-21 PROCEDURE — 83735 ASSAY OF MAGNESIUM: CPT | Performed by: NURSE PRACTITIONER

## 2022-02-21 PROCEDURE — 25000003 PHARM REV CODE 250: Performed by: HOSPITALIST

## 2022-02-21 PROCEDURE — 25000003 PHARM REV CODE 250: Performed by: EMERGENCY MEDICINE

## 2022-02-21 PROCEDURE — 85610 PROTHROMBIN TIME: CPT | Mod: 91 | Performed by: INTERNAL MEDICINE

## 2022-02-21 PROCEDURE — 25000242 PHARM REV CODE 250 ALT 637 W/ HCPCS: Performed by: INTERNAL MEDICINE

## 2022-02-21 PROCEDURE — 63600175 PHARM REV CODE 636 W HCPCS: Performed by: NURSE PRACTITIONER

## 2022-02-21 PROCEDURE — 25500020 PHARM REV CODE 255: Performed by: HOSPITALIST

## 2022-02-21 PROCEDURE — 63600175 PHARM REV CODE 636 W HCPCS: Performed by: INTERNAL MEDICINE

## 2022-02-21 PROCEDURE — 85610 PROTHROMBIN TIME: CPT | Performed by: HOSPITALIST

## 2022-02-21 PROCEDURE — 99232 PR SUBSEQUENT HOSPITAL CARE,LEVL II: ICD-10-PCS | Mod: ,,, | Performed by: INTERNAL MEDICINE

## 2022-02-21 PROCEDURE — 80053 COMPREHEN METABOLIC PANEL: CPT | Performed by: HOSPITALIST

## 2022-02-21 PROCEDURE — 25000003 PHARM REV CODE 250: Performed by: INTERNAL MEDICINE

## 2022-02-21 PROCEDURE — 25000003 PHARM REV CODE 250: Performed by: NURSE PRACTITIONER

## 2022-02-21 PROCEDURE — 36415 COLL VENOUS BLD VENIPUNCTURE: CPT | Performed by: HOSPITALIST

## 2022-02-21 PROCEDURE — 11000001 HC ACUTE MED/SURG PRIVATE ROOM

## 2022-02-21 PROCEDURE — 80053 COMPREHEN METABOLIC PANEL: CPT | Mod: 91 | Performed by: NURSE PRACTITIONER

## 2022-02-21 PROCEDURE — 99232 SBSQ HOSP IP/OBS MODERATE 35: CPT | Mod: ,,, | Performed by: INTERNAL MEDICINE

## 2022-02-21 RX ORDER — LISINOPRIL 20 MG/1
20 TABLET ORAL DAILY
Status: DISCONTINUED | OUTPATIENT
Start: 2022-02-22 | End: 2022-02-21 | Stop reason: HOSPADM

## 2022-02-21 RX ORDER — AMLODIPINE BESYLATE 5 MG/1
5 TABLET ORAL ONCE
Status: COMPLETED | OUTPATIENT
Start: 2022-02-21 | End: 2022-02-21

## 2022-02-21 RX ORDER — CLONIDINE HYDROCHLORIDE 0.1 MG/1
0.1 TABLET ORAL EVERY 8 HOURS PRN
Status: DISCONTINUED | OUTPATIENT
Start: 2022-02-21 | End: 2022-02-21 | Stop reason: HOSPADM

## 2022-02-21 RX ORDER — AMLODIPINE BESYLATE 5 MG/1
10 TABLET ORAL DAILY
Status: DISCONTINUED | OUTPATIENT
Start: 2022-02-22 | End: 2022-02-21 | Stop reason: HOSPADM

## 2022-02-21 RX ORDER — HYDRALAZINE HYDROCHLORIDE 20 MG/ML
5 INJECTION INTRAMUSCULAR; INTRAVENOUS ONCE
Status: COMPLETED | OUTPATIENT
Start: 2022-02-21 | End: 2022-02-21

## 2022-02-21 RX ADMIN — HYDRALAZINE HYDROCHLORIDE 5 MG: 20 INJECTION INTRAMUSCULAR; INTRAVENOUS at 08:02

## 2022-02-21 RX ADMIN — ACETYLCYSTEINE 8200 MG: 200 SOLUTION ORAL; RESPIRATORY (INHALATION) at 11:02

## 2022-02-21 RX ADMIN — LISINOPRIL 20 MG: 20 TABLET ORAL at 08:02

## 2022-02-21 RX ADMIN — IOHEXOL 100 ML: 350 INJECTION, SOLUTION INTRAVENOUS at 03:02

## 2022-02-21 RX ADMIN — CLONIDINE HYDROCHLORIDE 0.1 MG: 0.1 TABLET ORAL at 06:02

## 2022-02-21 RX ADMIN — PHYTONADIONE 10 MG: 10 INJECTION, EMULSION INTRAMUSCULAR; INTRAVENOUS; SUBCUTANEOUS at 10:02

## 2022-02-21 RX ADMIN — ENOXAPARIN SODIUM 40 MG: 100 INJECTION SUBCUTANEOUS at 05:02

## 2022-02-21 RX ADMIN — ACETYLCYSTEINE 8200 MG: 200 SOLUTION ORAL; RESPIRATORY (INHALATION) at 06:02

## 2022-02-21 RX ADMIN — INSULIN ASPART 2 UNITS: 100 INJECTION, SOLUTION INTRAVENOUS; SUBCUTANEOUS at 05:02

## 2022-02-21 RX ADMIN — AMLODIPINE BESYLATE 5 MG: 5 TABLET ORAL at 09:02

## 2022-02-21 RX ADMIN — ACETYLCYSTEINE 8200 MG: 200 SOLUTION ORAL; RESPIRATORY (INHALATION) at 02:02

## 2022-02-21 RX ADMIN — ACETYLCYSTEINE 8200 MG: 200 SOLUTION ORAL; RESPIRATORY (INHALATION) at 03:02

## 2022-02-21 RX ADMIN — AMLODIPINE BESYLATE 5 MG: 5 TABLET ORAL at 10:02

## 2022-02-21 NOTE — ASSESSMENT & PLAN NOTE
- Etiology unclear at this time  - Patient does report higher than normal intake of tylenol and other NSAIDS  - No substantial ETOH intake  - Hepatitis panel pending, and acetaminophen level <3  - Appreciate GI input, and patient started on acetylcysteine for possible tylenol toxicity  - Continue to trend LFTS and INR  - Will follow up on studies

## 2022-02-21 NOTE — SUBJECTIVE & OBJECTIVE
Subjective:     Interval History: doing well no complaints , no abd pain, no vomiting. No radiating symptoms  Wants to go home if possible  Concerned about paying his rent      Review of Systems   Constitutional:  Negative for chills and fever.   Respiratory:  Negative for choking and chest tightness.    Gastrointestinal:  Negative for abdominal pain and vomiting.   Neurological:  Negative for dizziness and headaches.   Psychiatric/Behavioral:  Negative for agitation and behavioral problems.    Objective:     Vital Signs (Most Recent):  Temp: 98 °F (36.7 °C) (02/21/22 1154)  Pulse: 61 (02/21/22 1154)  Resp: 18 (02/21/22 1154)  BP: 137/86 (02/21/22 1154)  SpO2: 98 % (02/21/22 1154) Vital Signs (24h Range):  Temp:  [98 °F (36.7 °C)-98.7 °F (37.1 °C)] 98 °F (36.7 °C)  Pulse:  [61-85] 61  Resp:  [18-20] 18  SpO2:  [96 %-100 %] 98 %  BP: (137-191)/() 137/86     Weight: 105.2 kg (231 lb 14.8 oz) (02/21/22 0550)  Body mass index is 31.45 kg/m².      Intake/Output Summary (Last 24 hours) at 2/21/2022 1713  Last data filed at 2/21/2022 1445  Gross per 24 hour   Intake 25 ml   Output 1300 ml   Net -1275 ml       Lines/Drains/Airways       Peripheral Intravenous Line  Duration                  Peripheral IV - Single Lumen 02/19/22 1656 20 G Right Forearm 2 days                    Physical Exam  Vitals reviewed.   Constitutional:       Appearance: He is not toxic-appearing.   HENT:      Head: Normocephalic and atraumatic.   Eyes:      General: No scleral icterus.     Conjunctiva/sclera: Conjunctivae normal.   Neurological:      Mental Status: He is alert and oriented to person, place, and time.      Gait: Gait normal.   Psychiatric:         Mood and Affect: Mood normal.         Behavior: Behavior normal.       Significant Labs:  CBC:   Recent Labs   Lab 02/20/22  0602 02/21/22  0540   WBC 4.03 3.53*   HGB 14.9 15.4   HCT 45.8 45.8    168     CMP:   Recent Labs   Lab 02/21/22  0540   *   CALCIUM 8.8    ALBUMIN 3.2*   PROT 6.9      K 4.0   CO2 24      BUN 9   CREATININE 1.0   ALKPHOS 151*   ALT 3,260*   AST 1,349*   BILITOT 4.7*     Coagulation:   Recent Labs   Lab 02/21/22  1608   INR 1.6*         Significant Imaging:  Imaging results within the past 24 hours have been reviewed.

## 2022-02-21 NOTE — ASSESSMENT & PLAN NOTE
Lab Results   Component Value Date    HGBA1C 7.9 (H) 02/20/2022   -Home regimen on hold  -Recent glucose levels were reviewed:   Recent Labs   Lab 02/20/22  1522 02/20/22  1927 02/21/22  0323 02/21/22  1157   POCTGLUCOSE 133* 128* 183* 179*   -Current correctional scale  Low  -Maintain anti-hyperglycemic dose as follows:  Antihyperglycemics (From admission, onward)            Start     Stop Route Frequency Ordered    02/19/22 2140  insulin aspart U-100 pen 0-5 Units         -- SubQ Before meals & nightly PRN 02/19/22 2044      -Continue to monitor levels and make adjustments as necessary to achieve levels of 140-180 during hospitalization

## 2022-02-21 NOTE — NURSING
Pt's BP continues to trend upwards. No s/s reported but Pt is concerned about elevated BP. On-call team notified. No orders given. WCTM.

## 2022-02-21 NOTE — PLAN OF CARE
Patient AAOx4, VSS, Patient denies any pain. Patient tolerating diet, no nausea or vomiting. BS monitored throughout shift. Free from falls. Patient in NAD. Call bell in reach. safety maintained. Will continue to monitor.   Problem: Adult Inpatient Plan of Care  Goal: Plan of Care Review  Outcome: Ongoing, Progressing  Goal: Patient-Specific Goal (Individualized)  Outcome: Ongoing, Progressing  Goal: Absence of Hospital-Acquired Illness or Injury  Outcome: Ongoing, Progressing  Goal: Optimal Comfort and Wellbeing  Outcome: Ongoing, Progressing  Goal: Readiness for Transition of Care  Outcome: Ongoing, Progressing     Problem: Diabetes Comorbidity  Goal: Blood Glucose Level Within Targeted Range  Outcome: Ongoing, Progressing     Problem: Hypertension Comorbidity  Goal: Blood Pressure in Desired Range  Outcome: Ongoing, Progressing

## 2022-02-21 NOTE — HOSPITAL COURSE
"Mr. Serra presented with abdominal pain and fatigue. He was admitted for abnormal LFTS and GI consulted. RUQ showed "gallbladder is decompressed, likely accounting for wall thickening noting no secondary findings to suggest acute cholecystitis.  Given the hepatic parenchyma is somewhat heterogeneous, gallbladder findings could relate to superimposed hepatic disease, correlation with LFTs recommended." Hepatitis panel pending. LFTS were monitored.  CT of the abdomen pelvis was unremarkable.  Further workup was in progress, but the patient decided to leave A despite being counseled.    * Elevated liver enzymes  - Etiology was unclear   - Patient did report higher than normal intake of tylenol and other NSAIDS, but this was not clear in terms of quantity  - No substantial ETOH intake reported  - Hepatitis panel pending, and acetaminophen level <3  - GI consulted, and patient was started on acetylcysteine for possible tylenol toxicity  - Acetylcysteine protocol initiated, but LFTs remained approximately the same  - Case discussed with GI, and he was given vitamin K x1 and further workup with the CT scan of the abdomen/pelvis which was unremarkable  - He did not have any abdominal pain after admission and he was able tolerate a diet  - LFTs were monitored along with PT INR, and workup was in progress  - However, the patient decided to leave A despite being heavily counseled    Type 2 diabetes mellitus with hyperglycemia        Lab Results   Component Value Date     HGBA1C 7.9 (H) 02/20/2022   -Home regimen was on hold  -Recent glucose levels were reviewed  -Correctional scale  Low  -Monitored levels and made adjustments as necessary to achieve levels of 140-180 during hospitalization     Pure hypercholesterolemia  - Not on statin and was not indicated at this time     Chronic gout  - Allopurinol stopped due to abnormal liver function tests     HTN (hypertension)  - Continued lotrel          "

## 2022-02-21 NOTE — PROGRESS NOTES
"[facility] Medicine  Progress Note    Patient Name: Jerel Serra  MRN: [id number]  Patient Class: IP- Inpatient   Admission Date: 2/19/2022  Length of Stay: 0 days  Attending Physician: Rianna Covington MD  Primary Care Provider: Wiley Piedra MD        Subjective:     Principal Problem:Elevated liver enzymes        HPI:  Jerel Serra is a 57 yo male with a pmh of HTN, DM2, gout. He presented with fatigue, N/V, and dark urine. He states he has been tired since Thursday and took off work on Friday. He became nauseous early Saturday morning and vomited once. He then noticed his urine was darker so he decided to come to the hospital. In the ED, he was found to have elevated LFTs. Total bili 4.6, alk phos 192, AST 1561, ALT 3320. He denies drinking alcohol more than socially. He denies IV drug use. COVID negative.       Overview/Hospital Course:  Mr. Serra presented with abdominal pain and fatigue. Placed in observation for abnormal LFTS and GI consulted. RUQ showed "gallbladder is decompressed, likely accounting for wall thickening noting no secondary findings to suggest acute cholecystitis.  Given the hepatic parenchyma is somewhat heterogeneous, gallbladder findings could relate to superimposed hepatic disease, correlation with LFTs recommended." Hepatitis panel pending. LFTS being monitored.      Interval History: No acute events, feels fine. No abdominal pain, no nausea or vomiting.    Review of Systems   Constitutional:  Negative for chills and fever.   Respiratory:  Negative for shortness of breath.    Gastrointestinal:  Negative for abdominal pain, diarrhea and vomiting.   Objective:     Vital Signs (Most Recent):  Temp: 98 °F (36.7 °C) (02/21/22 1154)  Pulse: 61 (02/21/22 1154)  Resp: 18 (02/21/22 1154)  BP: 137/86 (02/21/22 1154)  SpO2: 98 % (02/21/22 1154)   Vital Signs (24h Range):  Temp:  [97.9 °F (36.6 °C)-98.7 °F (37.1 °C)] 98 °F (36.7 °C)  Pulse:  [61-85] 61  Resp:  [18-20] 18  SpO2:  " [96 %-100 %] 98 %  BP: (137-191)/() 137/86     Weight: 105.2 kg (231 lb 14.8 oz)  Body mass index is 31.45 kg/m².    Intake/Output Summary (Last 24 hours) at 2/21/2022 1230  Last data filed at 2/21/2022 1002  Gross per 24 hour   Intake 25 ml   Output 1000 ml   Net -975 ml        Physical Exam  Vitals and nursing note reviewed.   Constitutional:       General: He is not in acute distress.     Appearance: Normal appearance. He is not ill-appearing.   HENT:      Head: Normocephalic and atraumatic.      Nose: Nose normal.      Mouth/Throat:      Mouth: Mucous membranes are moist.   Eyes:      General: Scleral icterus present.      Pupils: Pupils are equal, round, and reactive to light.   Cardiovascular:      Rate and Rhythm: Normal rate and regular rhythm.      Pulses: Normal pulses.      Heart sounds: Normal heart sounds.   Pulmonary:      Effort: Pulmonary effort is normal.      Breath sounds: Normal breath sounds.   Abdominal:      General: Bowel sounds are normal.      Palpations: Abdomen is soft.      Tenderness: There is no abdominal tenderness. There is no guarding or rebound.   Musculoskeletal:         General: No swelling. Normal range of motion.      Cervical back: Normal range of motion.   Skin:     General: Skin is warm and dry.      Coloration: Skin is jaundiced.   Neurological:      General: No focal deficit present.      Mental Status: He is alert and oriented to person, place, and time.      Motor: No weakness.   Psychiatric:         Behavior: Behavior normal.         Thought Content: Thought content normal.   o    Significant Labs: All pertinent labs within the past 24 hours have been reviewed.    Significant Imaging: I have reviewed all pertinent imaging results/findings within the past 24 hours.      Assessment/Plan:      * Elevated liver enzymes  - Etiology unclear at this time  - Patient does report higher than normal intake of tylenol and other NSAIDS, but this is not clear  - No substantial  ETOH intake reported  - Hepatitis panel pending, and acetaminophen level <3  - Appreciate GI input, and patient started on acetylcysteine for possible tylenol toxicity  - Continue acetylcysteine protocol  - no significant improvement liver function tests today  - Case discussed with GI, will give vitamin K x1 and further workup with the CT scan of the abdomen/pelvis today  - Continue to trend LFTS and INR  - Will follow up on studies    Type 2 diabetes mellitus with hyperglycemia  Lab Results   Component Value Date    HGBA1C 7.9 (H) 02/20/2022   -Home regimen on hold  -Recent glucose levels were reviewed:   Recent Labs   Lab 02/20/22  1522 02/20/22  1927 02/21/22  0323 02/21/22  1157   POCTGLUCOSE 133* 128* 183* 179*   -Current correctional scale  Low  -Maintain anti-hyperglycemic dose as follows:  Antihyperglycemics (From admission, onward)            Start     Stop Route Frequency Ordered    02/19/22 2140  insulin aspart U-100 pen 0-5 Units         -- SubQ Before meals & nightly PRN 02/19/22 2044      -Continue to monitor levels and make adjustments as necessary to achieve levels of 140-180 during hospitalization    Pure hypercholesterolemia  - Not on statin and not indicated at this time    Chronic gout  - Allopurinol stopped due to abnormal liver function tests    HTN (hypertension)  - Continue lotrel      VTE Risk Mitigation (From admission, onward)         Ordered     enoxaparin injection 40 mg  Daily         02/19/22 2044     IP VTE HIGH RISK PATIENT  Once         02/19/22 2044     Place sequential compression device  Until discontinued         02/19/22 2044                    Rianna Covington MD  Department of Hospital Medicine   [facility]

## 2022-02-21 NOTE — PROGRESS NOTES
[facility]  Gastroenterology  Progress Note    Patient Name: Jerel Serra  MRN: [id number]  Admission Date: 2/19/2022  Hospital Length of Stay: 0 days  Code Status: Full Code   Attending Provider: Rianna Covington MD  Consulting Provider: James Alvarado MD  Primary Care Physician: Wiley Piedra MD  Principal Problem: Elevated liver enzymes      Subjective:     Interval History: doing well no complaints , no abd pain, no vomiting. No radiating symptoms  Wants to go home if possible  Concerned about paying his rent      Review of Systems   Constitutional:  Negative for chills and fever.   Respiratory:  Negative for choking and chest tightness.    Gastrointestinal:  Negative for abdominal pain and vomiting.   Neurological:  Negative for dizziness and headaches.   Psychiatric/Behavioral:  Negative for agitation and behavioral problems.    Objective:     Vital Signs (Most Recent):  Temp: 98 °F (36.7 °C) (02/21/22 1154)  Pulse: 61 (02/21/22 1154)  Resp: 18 (02/21/22 1154)  BP: 137/86 (02/21/22 1154)  SpO2: 98 % (02/21/22 1154) Vital Signs (24h Range):  Temp:  [98 °F (36.7 °C)-98.7 °F (37.1 °C)] 98 °F (36.7 °C)  Pulse:  [61-85] 61  Resp:  [18-20] 18  SpO2:  [96 %-100 %] 98 %  BP: (137-191)/() 137/86     Weight: 105.2 kg (231 lb 14.8 oz) (02/21/22 0550)  Body mass index is 31.45 kg/m².      Intake/Output Summary (Last 24 hours) at 2/21/2022 1713  Last data filed at 2/21/2022 1445  Gross per 24 hour   Intake 25 ml   Output 1300 ml   Net -1275 ml       Lines/Drains/Airways       Peripheral Intravenous Line  Duration                  Peripheral IV - Single Lumen 02/19/22 1656 20 G Right Forearm 2 days                    Physical Exam  Vitals reviewed.   Constitutional:       Appearance: He is not toxic-appearing.   HENT:      Head: Normocephalic and atraumatic.   Eyes:      General: No scleral icterus.     Conjunctiva/sclera: Conjunctivae normal.   Neurological:      Mental Status: He is alert and oriented to  person, place, and time.      Gait: Gait normal.   Psychiatric:         Mood and Affect: Mood normal.         Behavior: Behavior normal.       Significant Labs:  CBC:   Recent Labs   Lab 02/20/22  0602 02/21/22  0540   WBC 4.03 3.53*   HGB 14.9 15.4   HCT 45.8 45.8    168     CMP:   Recent Labs   Lab 02/21/22  0540   *   CALCIUM 8.8   ALBUMIN 3.2*   PROT 6.9      K 4.0   CO2 24      BUN 9   CREATININE 1.0   ALKPHOS 151*   ALT 3,260*   AST 1,349*   BILITOT 4.7*     Coagulation:   Recent Labs   Lab 02/21/22  1608   INR 1.6*         Significant Imaging:  Imaging results within the past 24 hours have been reviewed.    Assessment/Plan:     * Elevated liver enzymes  Initially thought perhaps related to more chronic but large tylenol use  Given NAC.  LFTs still remaining elevated, perhaps and hope to see they are peaked  Etiology remains somewhat unclear    CMP and INR daily  Obtain CT triple phase to eval structure of liver and vascular phenomenon  Check LDH and CPK in AM    Will keep NPO past Mn in case LFTs remain worsening in which case a liver biopsy may need to be pursued.    If LFTs with decent downtrend this afternoon or tomorrow AM, ok to d/c and will arrange close follow up            Thank you for your consult. I will follow-up with patient. Please contact us if you have any additional questions.    James Alvarado MD  Gastroenterology  [facility]

## 2022-02-21 NOTE — SUBJECTIVE & OBJECTIVE
Interval History: No acute events, feels fine. No abdominal pain, no nausea or vomiting.    Review of Systems   Constitutional:  Negative for chills and fever.   Respiratory:  Negative for shortness of breath.    Gastrointestinal:  Negative for abdominal pain, diarrhea and vomiting.   Objective:     Vital Signs (Most Recent):  Temp: 98 °F (36.7 °C) (02/21/22 1154)  Pulse: 61 (02/21/22 1154)  Resp: 18 (02/21/22 1154)  BP: 137/86 (02/21/22 1154)  SpO2: 98 % (02/21/22 1154)   Vital Signs (24h Range):  Temp:  [97.9 °F (36.6 °C)-98.7 °F (37.1 °C)] 98 °F (36.7 °C)  Pulse:  [61-85] 61  Resp:  [18-20] 18  SpO2:  [96 %-100 %] 98 %  BP: (137-191)/() 137/86     Weight: 105.2 kg (231 lb 14.8 oz)  Body mass index is 31.45 kg/m².    Intake/Output Summary (Last 24 hours) at 2/21/2022 1230  Last data filed at 2/21/2022 1002  Gross per 24 hour   Intake 25 ml   Output 1000 ml   Net -975 ml        Physical Exam  Vitals and nursing note reviewed.   Constitutional:       General: He is not in acute distress.     Appearance: Normal appearance. He is not ill-appearing.   HENT:      Head: Normocephalic and atraumatic.      Nose: Nose normal.      Mouth/Throat:      Mouth: Mucous membranes are moist.   Eyes:      General: Scleral icterus present.      Pupils: Pupils are equal, round, and reactive to light.   Cardiovascular:      Rate and Rhythm: Normal rate and regular rhythm.      Pulses: Normal pulses.      Heart sounds: Normal heart sounds.   Pulmonary:      Effort: Pulmonary effort is normal.      Breath sounds: Normal breath sounds.   Abdominal:      General: Bowel sounds are normal.      Palpations: Abdomen is soft.      Tenderness: There is no abdominal tenderness. There is no guarding or rebound.   Musculoskeletal:         General: No swelling. Normal range of motion.      Cervical back: Normal range of motion.   Skin:     General: Skin is warm and dry.      Coloration: Skin is jaundiced.   Neurological:      General: No focal  deficit present.      Mental Status: He is alert and oriented to person, place, and time.      Motor: No weakness.   Psychiatric:         Behavior: Behavior normal.         Thought Content: Thought content normal.   o    Significant Labs: All pertinent labs within the past 24 hours have been reviewed.    Significant Imaging: I have reviewed all pertinent imaging results/findings within the past 24 hours.

## 2022-02-21 NOTE — ASSESSMENT & PLAN NOTE
Lab Results   Component Value Date    HGBA1C 7.9 (H) 02/20/2022   -Home regimen on hold  -Recent glucose levels were reviewed:   Recent Labs   Lab 02/20/22  0459 02/20/22  1205 02/20/22  1522 02/20/22  1927   POCTGLUCOSE 161* 138* 133* 128*   -Current correctional scale  Low  -Maintain anti-hyperglycemic dose as follows:  Antihyperglycemics (From admission, onward)            Start     Stop Route Frequency Ordered    02/19/22 2140  insulin aspart U-100 pen 0-5 Units         -- SubQ Before meals & nightly PRN 02/19/22 2044      -Continue to monitor levels and make adjustments as necessary to achieve levels of 140-180 during hospitalization

## 2022-02-21 NOTE — PLAN OF CARE
"[place] met with pt   physical address:  [address]  [address]  [place]       lives with wife Mara  [phone]   independent prior to admit - no dme, no hh   pt drove to hospital - expects to drive himself home at d/c     pt's pcp -- Dr. Gurrola"  p    pt ok to see JULIETH FLORES at [facility]     Scheduling Navigators to assist with f/u apts.         02/21/22 2423   Discharge Planning   Assessment Type Discharge Planning Brief Assessment   Resource/Environmental Concerns none   Support Systems Spouse/significant other   Equipment Currently Used at Home none   Current Living Arrangements home/apartment/condo   Patient/Family Anticipates Transition to home;home with family   Patient/Family Anticipated Services at Transition none   DME Needed Upon Discharge  none   Discharge Plan A Home;Home with family     "

## 2022-02-21 NOTE — SUBJECTIVE & OBJECTIVE
Interval History: No acute events, feels fine. Report taking tylenol about 3 times/day for the past few days along with NSAIDS, Feels fine otherwise.    Review of Systems   Constitutional:  Negative for chills and fever.   Respiratory:  Negative for shortness of breath.    Gastrointestinal:  Negative for abdominal pain, diarrhea and vomiting.   Objective:     Vital Signs (Most Recent):  Temp: 98.7 °F (37.1 °C) (02/20/22 1929)  Pulse: 84 (02/20/22 1929)  Resp: 20 (02/20/22 1929)  BP: (!) 183/102 (02/20/22 1929)  SpO2: 98 % (02/20/22 1929)   Vital Signs (24h Range):  Temp:  [97.1 °F (36.2 °C)-98.7 °F (37.1 °C)] 98.7 °F (37.1 °C)  Pulse:  [54-84] 84  Resp:  [18-20] 20  SpO2:  [97 %-100 %] 98 %  BP: (140-183)/() 183/102     Weight: 117.2 kg (258 lb 6.1 oz)  Body mass index is 35.04 kg/m².    Intake/Output Summary (Last 24 hours) at 2/20/2022 2225  Last data filed at 2/20/2022 0500  Gross per 24 hour   Intake --   Output 0 ml   Net 0 ml      Physical Exam  Vitals and nursing note reviewed.   Constitutional:       General: He is not in acute distress.     Appearance: Normal appearance. He is not ill-appearing.   HENT:      Head: Normocephalic and atraumatic.      Nose: Nose normal.      Mouth/Throat:      Mouth: Mucous membranes are moist.   Eyes:      General: Scleral icterus present.      Pupils: Pupils are equal, round, and reactive to light.   Cardiovascular:      Rate and Rhythm: Normal rate and regular rhythm.      Pulses: Normal pulses.      Heart sounds: Normal heart sounds.   Pulmonary:      Effort: Pulmonary effort is normal.      Breath sounds: Normal breath sounds.   Abdominal:      General: Bowel sounds are normal.      Palpations: Abdomen is soft.      Tenderness: There is no abdominal tenderness. There is no guarding or rebound.   Musculoskeletal:         General: No swelling. Normal range of motion.      Cervical back: Normal range of motion.   Skin:     General: Skin is warm and dry.       Coloration: Skin is jaundiced.   Neurological:      General: No focal deficit present.      Mental Status: He is alert and oriented to person, place, and time.      Motor: No weakness.   Psychiatric:         Behavior: Behavior normal.         Thought Content: Thought content normal.   o    Significant Labs: All pertinent labs within the past 24 hours have been reviewed.    Significant Imaging: I have reviewed all pertinent imaging results/findings within the past 24 hours.

## 2022-02-21 NOTE — PROGRESS NOTES
[facility]  Progress Note    Patient Name: Jerel Serra  MRN: [id number]  Patient Class: OP- Observation   Admission Date: 2/19/2022  Length of Stay: 0 days  Attending Physician: Rianna Covington MD  Primary Care Provider: Wiley Piedra MD        Subjective:     Principal Problem:Elevated liver enzymes        HPI:  Jerel Serar is a 55 yo male with a pmh of HTN, DM2, gout. He presented with fatigue, N/V, and dark urine. He states he has been tired since Thursday and took off work on Friday. He became nauseous early Saturday morning and vomited once. He then noticed his urine was darker so he decided to come to the hospital. In the ED, he was found to have elevated LFTs. Total bili 4.6, alk phos 192, AST 1561, ALT 3320. He denies drinking alcohol more than socially. He denies IV drug use. COVID negative.       Overview/Hospital Course:  Mr. Serra presented with abdominal pain and fatigue. Placed in observation for abnormal LFTS and GI consulted.      Interval History: No acute events, feels fine. Report taking tylenol about 3 times/day for the past few days along with NSAIDS, Feels fine otherwise.    Review of Systems   Constitutional:  Negative for chills and fever.   Respiratory:  Negative for shortness of breath.    Gastrointestinal:  Negative for abdominal pain, diarrhea and vomiting.   Objective:     Vital Signs (Most Recent):  Temp: 98.7 °F (37.1 °C) (02/20/22 1929)  Pulse: 84 (02/20/22 1929)  Resp: 20 (02/20/22 1929)  BP: (!) 183/102 (02/20/22 1929)  SpO2: 98 % (02/20/22 1929)   Vital Signs (24h Range):  Temp:  [97.1 °F (36.2 °C)-98.7 °F (37.1 °C)] 98.7 °F (37.1 °C)  Pulse:  [54-84] 84  Resp:  [18-20] 20  SpO2:  [97 %-100 %] 98 %  BP: (140-183)/() 183/102     Weight: 117.2 kg (258 lb 6.1 oz)  Body mass index is 35.04 kg/m².    Intake/Output Summary (Last 24 hours) at 2/20/2022 2225  Last data filed at 2/20/2022 0500  Gross per 24 hour   Intake --   Output 0 ml   Net 0 ml       Physical Exam  Vitals and nursing note reviewed.   Constitutional:       General: He is not in acute distress.     Appearance: Normal appearance. He is not ill-appearing.   HENT:      Head: Normocephalic and atraumatic.      Nose: Nose normal.      Mouth/Throat:      Mouth: Mucous membranes are moist.   Eyes:      General: Scleral icterus present.      Pupils: Pupils are equal, round, and reactive to light.   Cardiovascular:      Rate and Rhythm: Normal rate and regular rhythm.      Pulses: Normal pulses.      Heart sounds: Normal heart sounds.   Pulmonary:      Effort: Pulmonary effort is normal.      Breath sounds: Normal breath sounds.   Abdominal:      General: Bowel sounds are normal.      Palpations: Abdomen is soft.      Tenderness: There is no abdominal tenderness. There is no guarding or rebound.   Musculoskeletal:         General: No swelling. Normal range of motion.      Cervical back: Normal range of motion.   Skin:     General: Skin is warm and dry.      Coloration: Skin is jaundiced.   Neurological:      General: No focal deficit present.      Mental Status: He is alert and oriented to person, place, and time.      Motor: No weakness.   Psychiatric:         Behavior: Behavior normal.         Thought Content: Thought content normal.   o    Significant Labs: All pertinent labs within the past 24 hours have been reviewed.    Significant Imaging: I have reviewed all pertinent imaging results/findings within the past 24 hours.      Assessment/Plan:      * Elevated liver enzymes  - Etiology unclear at this time  - Patient does report higher than normal intake of tylenol and other NSAIDS  - No substantial ETOH intake  - Hepatitis panel pending, and acetaminophen level <3  - Appreciate GI input, and patient started on acetylcysteine for possible tylenol toxicity  - Continue to trend LFTS and INR  - Will follow up on studies    Type 2 diabetes mellitus with hyperglycemia  Lab Results   Component Value Date     HGBA1C 7.9 (H) 02/20/2022   -Home regimen on hold  -Recent glucose levels were reviewed:   Recent Labs   Lab 02/20/22  0459 02/20/22  1205 02/20/22  1522 02/20/22  1927   POCTGLUCOSE 161* 138* 133* 128*   -Current correctional scale  Low  -Maintain anti-hyperglycemic dose as follows:  Antihyperglycemics (From admission, onward)            Start     Stop Route Frequency Ordered    02/19/22 2140  insulin aspart U-100 pen 0-5 Units         -- SubQ Before meals & nightly PRN 02/19/22 2044      -Continue to monitor levels and make adjustments as necessary to achieve levels of 140-180 during hospitalization    Pure hypercholesterolemia  - Not on statin and not indicated at this time    Chronic gout  - Allopurinol stopped    HTN (hypertension)  - Continue lotrel      VTE Risk Mitigation (From admission, onward)         Ordered     enoxaparin injection 40 mg  Daily         02/19/22 2044     IP VTE HIGH RISK PATIENT  Once         02/19/22 2044     Place sequential compression device  Until discontinued         02/19/22 2044                        Rianna Covington MD  Department of Hospital Medicine   [facility]

## 2022-02-21 NOTE — ASSESSMENT & PLAN NOTE
Initially thought perhaps related to more chronic but large tylenol use  Given NAC.  LFTs still remaining elevated, perhaps and hope to see they are peaked  Etiology remains somewhat unclear    CMP and INR daily  Obtain CT triple phase to eval structure of liver and vascular phenomenon  Check LDH and CPK in AM    Will keep NPO past Mn in case LFTs remain worsening in which case a liver biopsy may need to be pursued.    If LFTs with decent downtrend this afternoon or tomorrow AM, ok to d/c and will arrange close follow up

## 2022-02-21 NOTE — ASSESSMENT & PLAN NOTE
- Etiology unclear at this time  - Patient does report higher than normal intake of tylenol and other NSAIDS, but this is not clear  - No substantial ETOH intake reported  - Hepatitis panel pending, and acetaminophen level <3  - Appreciate GI input, and patient started on acetylcysteine for possible tylenol toxicity  - Continue acetylcysteine protocol  - no significant improvement liver function tests today  - Case discussed with GI, will give vitamin K x1 and further workup with the CT scan of the abdomen/pelvis today  - Continue to trend LFTS and INR  - Will follow up on studies

## 2022-02-22 NOTE — CARE UPDATE
"Called to bedside for patient requesting to leave AM. I have explained at length the additional labs and possible liver biopsy needed to determine cause of his liver failure. I have explained the risks of leaving and remaining untreated. He states he is afraid to lose his home and has to go back to work to make money to pay his rent. He states he is "afraid of lots of things" and did not elaborate. I have explained the liver biopsy procedure to try to alleviate his fears. He is adamant about leaving [facility]. He states he will come back to the hospital if he starts feeling bad and will follow up with his PCP next month and let him know that he was admitted for elevated liver enzymes. The patient is leaving [place]. At his request, he was provided a document stating that he was under our care since 2/19/22.    "

## 2022-02-22 NOTE — TELEPHONE ENCOUNTER
Pt left AMA    Will order twice weekly labs with inr , cmp  Placed urgent hepatology referra    chyna can we set up labs later this week and then at least twice a week next week    Also please try to get him into hepatoloyg asap.

## 2022-02-23 ENCOUNTER — TELEPHONE (OUTPATIENT)
Dept: GASTROENTEROLOGY | Facility: CLINIC | Age: 57
End: 2022-02-23
Payer: COMMERCIAL

## 2022-02-23 NOTE — TELEPHONE ENCOUNTER
Spoke with patient and was able to scheduled labs on 2/26/22 at 10am  3/2/22 at 10am and 3/5/22 at 10am. Patient has a hepatology appt already scheduled on 3/16/22.

## 2022-02-23 NOTE — TELEPHONE ENCOUNTER
----- Message from Catherine Lindquist MA sent at 2/22/2022  4:25 PM CST -----  Pt left AMA     Will order twice weekly labs with inr , cmp  Placed urgent hepatology referra     chyna can we set up labs later this week and then at least twice a week next week     Also please try to get him into hepatoloyg asap.

## 2022-02-25 LAB
HAV IGM SERPL QL IA: NEGATIVE
HBSAG CONFIRMATION: POSITIVE
HBV CORE IGM SERPL QL IA: POSITIVE
HBV SURFACE AG SERPL QL IA: POSITIVE
HCV AB SERPL QL IA: NEGATIVE

## 2022-03-01 NOTE — DISCHARGE SUMMARY
"[facility]  AMA/Discharge Summary      Patient Name: Jerel Serra  MRN: [id number]  Patient Class: IP- Inpatient  Admission Date: 2/19/2022  Hospital Length of Stay: 1 days  Discharge Date and Time: 2/21/2022  9:00 PM  Attending Physician: Rianna Covington MD  Discharging Provider: Rianna Covington MD  Primary Care Provider: Wiley Piedra MD      HPI:   Jerel Serra is a 55 yo male with a pmh of HTN, DM2, gout. He presented with fatigue, N/V, and dark urine. He states he has been tired since Thursday and took off work on Friday. He became nauseous early Saturday morning and vomited once. He then noticed his urine was darker so he decided to come to the hospital. In the ED, he was found to have elevated LFTs. Total bili 4.6, alk phos 192, AST 1561, ALT 3320. He denies drinking alcohol more than socially. He denies IV drug use. COVID negative.       * No surgery found *      Hospital Course:   Mr. Serra presented with abdominal pain and fatigue. He was admitted for abnormal LFTS and GI consulted. RUQ showed "gallbladder is decompressed, likely accounting for wall thickening noting no secondary findings to suggest acute cholecystitis.  Given the hepatic parenchyma is somewhat heterogeneous, gallbladder findings could relate to superimposed hepatic disease, correlation with LFTs recommended." Hepatitis panel pending. LFTS were monitored.  CT of the abdomen pelvis was unremarkable.  Further workup was in progress, but the patient decided to leave [place] despite being counseled.    * Elevated liver enzymes  - Etiology was unclear   - Patient did report higher than normal intake of tylenol and other NSAIDS, but this was not clear in terms of quantity  - No substantial ETOH intake reported  - Hepatitis panel pending, and acetaminophen level <3  - GI consulted, and patient was started on acetylcysteine for possible tylenol toxicity  - Acetylcysteine protocol initiated, but LFTs remained approximately " the same  - Case discussed with GI, and he was given vitamin K x1 and further workup with the CT scan of the abdomen/pelvis which was unremarkable  - He did not have any abdominal pain after admission and he was able tolerate a diet  - LFTs were monitored along with PT INR, and workup was in progress  - However, the patient decided to leave AMA despite being heavily counseled    Type 2 diabetes mellitus with hyperglycemia        Lab Results   Component Value Date     HGBA1C 7.9 (H) 02/20/2022   -Home regimen was on hold  -Recent glucose levels were reviewed  -Correctional scale  Low  -Monitored levels and made adjustments as necessary to achieve levels of 140-180 during hospitalization     Pure hypercholesterolemia  - Not on statin and was not indicated at this time     Chronic gout  - Allopurinol stopped due to abnormal liver function tests     HTN (hypertension)  - Continued lotrel               Goals of Care Treatment Preferences:  Code Status: Full Code      Consults:   Consults (From admission, onward)        Status Ordering Provider     Gastroenterology  Once        Provider:  MD Jimi Ozuna CHASTITY L.          No new Assessment & Plan notes have been filed under this hospital service since the last note was generated.  Service: Hospital Medicine    Final Active Diagnoses:    Diagnosis Date Noted POA    PRINCIPAL PROBLEM:  Elevated liver enzymes [R74.8] 02/19/2022 Yes    Pure hypercholesterolemia [E78.00] 02/19/2022 Yes    Type 2 diabetes mellitus with hyperglycemia [E11.65] 07/15/2021 Yes    HTN (hypertension) [I10] 04/06/2013 Yes     Chronic    Chronic gout [M1A.9XX0] 04/06/2013 Yes     Chronic      Problems Resolved During this Admission:       Discharged Condition: against medical advice    Disposition: Left Against Medical Adv*    Follow Up: AMA    Patient Instructions:   No discharge procedures on file.    Significant Diagnostic Studies:     Pending Diagnostic  "Studies:     None         Medications:  Reconciled Home Medications:      Medication List      ASK your doctor about these medications    allopurinoL 300 MG tablet  Commonly known as: ZYLOPRIM  Take 300 mg by mouth once daily.  Ask about: Which instructions should I use?     amlodipine-benazepril 5-20 mg 5-20 mg per capsule  Commonly known as: LOTREL     BD CASSIDY 2ND GEN PEN NEEDLE 32 gauge x 5/32" Ndle  Generic drug: pen needle, diabetic  2 (two) times a day.     cetirizine 10 MG tablet  Commonly known as: ZYRTEC  Take 1 tablet (10 mg total) by mouth once daily.     fluticasone propionate 50 mcg/actuation nasal spray  Commonly known as: FLONASE  SHAKE LIQUID AND USE 1 SPRAY IN EACH NOSTRIL TWICE DAILY     FREESTYLE TAPAN 14 DAY SENSOR Kit  Generic drug: flash glucose sensor  Apply topically every 14 (fourteen) days.     insulin glargine 100 units/mL (3mL) SubQ pen  Inject 10 Units into the skin.     metFORMIN 500 MG tablet  Commonly known as: GLUCOPHAGE  500 mg 2 (two) times a day.     multivitamin with minerals tablet  Take 1 tablet by mouth once daily.     sildenafiL 50 MG tablet  Commonly known as: VIAGRA  Take 50 mg by mouth.            Indwelling Lines/Drains at time of discharge:   Lines/Drains/Airways     None                 Time spent on the discharge of patient: 20 minutes         Rianna Covington MD  Department of Hospital Medicine  [facility]  "

## 2022-03-04 ENCOUNTER — TELEPHONE (OUTPATIENT)
Dept: GASTROENTEROLOGY | Facility: CLINIC | Age: 57
End: 2022-03-04
Payer: COMMERCIAL

## 2022-03-04 NOTE — TELEPHONE ENCOUNTER
----- Message from James Alvarado MD sent at 3/4/2022 12:25 PM CST -----  Please inform the patient that his labs done while inpatient show active hepatitis B  He needs to repeat the labs and keep his appt with hepatology 3/16.  This is extremely important.  ----- Message -----  From: Mary Berg NP  Sent: 3/4/2022  10:13 AM CST  To: James Alvarado MD    Hey, attached is a patient that hepatitis testing was ordered on from admission. Wanted to make sure you were aware. Thanks!      Mary Berg

## 2022-03-05 ENCOUNTER — LAB VISIT (OUTPATIENT)
Dept: LAB | Facility: HOSPITAL | Age: 57
End: 2022-03-05
Attending: INTERNAL MEDICINE
Payer: COMMERCIAL

## 2022-03-05 DIAGNOSIS — R74.8 ELEVATED LIVER ENZYMES: ICD-10-CM

## 2022-03-05 LAB
ALBUMIN SERPL BCP-MCNC: 3.6 G/DL (ref 3.5–5.2)
ALP SERPL-CCNC: 161 U/L (ref 55–135)
ALT SERPL W/O P-5'-P-CCNC: 1085 U/L (ref 10–44)
ANION GAP SERPL CALC-SCNC: 8 MMOL/L (ref 8–16)
AST SERPL-CCNC: 507 U/L (ref 10–40)
BILIRUB SERPL-MCNC: 1.8 MG/DL (ref 0.1–1)
BUN SERPL-MCNC: 15 MG/DL (ref 6–20)
CALCIUM SERPL-MCNC: 9 MG/DL (ref 8.7–10.5)
CHLORIDE SERPL-SCNC: 103 MMOL/L (ref 95–110)
CO2 SERPL-SCNC: 27 MMOL/L (ref 23–29)
CREAT SERPL-MCNC: 1 MG/DL (ref 0.5–1.4)
EST. GFR  (AFRICAN AMERICAN): >60 ML/MIN/1.73 M^2
EST. GFR  (NON AFRICAN AMERICAN): >60 ML/MIN/1.73 M^2
GLUCOSE SERPL-MCNC: 164 MG/DL (ref 70–110)
INR PPP: 1.5 (ref 0.8–1.2)
POTASSIUM SERPL-SCNC: 3.8 MMOL/L (ref 3.5–5.1)
PROT SERPL-MCNC: 7.3 G/DL (ref 6–8.4)
PROTHROMBIN TIME: 15.3 SEC (ref 9–12.5)
SODIUM SERPL-SCNC: 138 MMOL/L (ref 136–145)

## 2022-03-05 PROCEDURE — 80053 COMPREHEN METABOLIC PANEL: CPT | Performed by: INTERNAL MEDICINE

## 2022-03-05 PROCEDURE — 36415 COLL VENOUS BLD VENIPUNCTURE: CPT | Performed by: INTERNAL MEDICINE

## 2022-03-05 PROCEDURE — 85610 PROTHROMBIN TIME: CPT | Performed by: INTERNAL MEDICINE

## 2022-03-07 ENCOUNTER — TELEPHONE (OUTPATIENT)
Dept: GASTROENTEROLOGY | Facility: CLINIC | Age: 57
End: 2022-03-07
Payer: COMMERCIAL

## 2022-03-07 NOTE — TELEPHONE ENCOUNTER
----- Message from James Alvarado MD sent at 3/7/2022  3:49 PM CST -----  Please inform the labs look better   Still needs hepatology follow up  ----- Message -----  From: Catherine Lindquist MA  Sent: 3/7/2022   2:00 PM CST  To: James Alvarado MD    Patient would like lab results    ----- Message -----  From: Willa Perez  Sent: 3/7/2022  12:23 PM CST  To: Christiano JANE Staff    Needs advice from nurse:      Who Called:pt  Regarding:would like lab results  Would the patient rather a call back or VIA [facility]?  Best Call Back number:[phone]  Additional Info:

## 2022-03-23 ENCOUNTER — LAB VISIT (OUTPATIENT)
Dept: LAB | Facility: HOSPITAL | Age: 57
End: 2022-03-23
Payer: COMMERCIAL

## 2022-03-23 ENCOUNTER — OFFICE VISIT (OUTPATIENT)
Dept: HEPATOLOGY | Facility: CLINIC | Age: 57
End: 2022-03-23
Payer: COMMERCIAL

## 2022-03-23 VITALS
OXYGEN SATURATION: 98 % | HEART RATE: 86 BPM | DIASTOLIC BLOOD PRESSURE: 89 MMHG | WEIGHT: 244.94 LBS | BODY MASS INDEX: 33.18 KG/M2 | SYSTOLIC BLOOD PRESSURE: 173 MMHG | RESPIRATION RATE: 18 BRPM | HEIGHT: 72 IN | TEMPERATURE: 97 F

## 2022-03-23 DIAGNOSIS — R76.8 HEPATITIS B SURFACE ANTIGEN POSITIVE: Primary | ICD-10-CM

## 2022-03-23 DIAGNOSIS — R76.8 HEPATITIS B SURFACE ANTIGEN POSITIVE: ICD-10-CM

## 2022-03-23 DIAGNOSIS — R74.8 ELEVATED LIVER ENZYMES: ICD-10-CM

## 2022-03-23 LAB
AFP SERPL-MCNC: 40 NG/ML (ref 0–8.4)
ALBUMIN SERPL BCP-MCNC: 3.4 G/DL (ref 3.5–5.2)
ALP SERPL-CCNC: 192 U/L (ref 55–135)
ALT SERPL W/O P-5'-P-CCNC: 599 U/L (ref 10–44)
ANION GAP SERPL CALC-SCNC: 9 MMOL/L (ref 8–16)
AST SERPL-CCNC: 209 U/L (ref 10–40)
BILIRUB SERPL-MCNC: 0.9 MG/DL (ref 0.1–1)
BUN SERPL-MCNC: 17 MG/DL (ref 6–20)
CALCIUM SERPL-MCNC: 9.4 MG/DL (ref 8.7–10.5)
CHLORIDE SERPL-SCNC: 105 MMOL/L (ref 95–110)
CO2 SERPL-SCNC: 25 MMOL/L (ref 23–29)
CREAT SERPL-MCNC: 0.9 MG/DL (ref 0.5–1.4)
ERYTHROCYTE [DISTWIDTH] IN BLOOD BY AUTOMATED COUNT: 15.9 % (ref 11.5–14.5)
EST. GFR  (AFRICAN AMERICAN): >60 ML/MIN/1.73 M^2
EST. GFR  (NON AFRICAN AMERICAN): >60 ML/MIN/1.73 M^2
GLUCOSE SERPL-MCNC: 209 MG/DL (ref 70–110)
HCT VFR BLD AUTO: 45.3 % (ref 40–54)
HGB BLD-MCNC: 15.1 G/DL (ref 14–18)
INR PPP: 1.3 (ref 0.8–1.2)
MCH RBC QN AUTO: 30.5 PG (ref 27–31)
MCHC RBC AUTO-ENTMCNC: 33.3 G/DL (ref 32–36)
MCV RBC AUTO: 92 FL (ref 82–98)
PLATELET # BLD AUTO: 147 K/UL (ref 150–450)
PMV BLD AUTO: 13.4 FL (ref 9.2–12.9)
POTASSIUM SERPL-SCNC: 3.7 MMOL/L (ref 3.5–5.1)
PROT SERPL-MCNC: 7.2 G/DL (ref 6–8.4)
PROTHROMBIN TIME: 13.7 SEC (ref 9–12.5)
RBC # BLD AUTO: 4.95 M/UL (ref 4.6–6.2)
SODIUM SERPL-SCNC: 139 MMOL/L (ref 136–145)
WBC # BLD AUTO: 3.53 K/UL (ref 3.9–12.7)

## 2022-03-23 PROCEDURE — 1160F RVW MEDS BY RX/DR IN RCRD: CPT | Mod: CPTII,S$GLB,, | Performed by: NURSE PRACTITIONER

## 2022-03-23 PROCEDURE — 87517 HEPATITIS B DNA QUANT: CPT | Performed by: NURSE PRACTITIONER

## 2022-03-23 PROCEDURE — 3077F PR MOST RECENT SYSTOLIC BLOOD PRESSURE >= 140 MM HG: ICD-10-PCS | Mod: CPTII,S$GLB,, | Performed by: NURSE PRACTITIONER

## 2022-03-23 PROCEDURE — 1111F DSCHRG MED/CURRENT MED MERGE: CPT | Mod: CPTII,S$GLB,, | Performed by: NURSE PRACTITIONER

## 2022-03-23 PROCEDURE — 85610 PROTHROMBIN TIME: CPT | Performed by: NURSE PRACTITIONER

## 2022-03-23 PROCEDURE — 1160F PR REVIEW ALL MEDS BY PRESCRIBER/CLIN PHARMACIST DOCUMENTED: ICD-10-PCS | Mod: CPTII,S$GLB,, | Performed by: NURSE PRACTITIONER

## 2022-03-23 PROCEDURE — 86706 HEP B SURFACE ANTIBODY: CPT | Performed by: NURSE PRACTITIONER

## 2022-03-23 PROCEDURE — 99999 PR PBB SHADOW E&M-EST. PATIENT-LVL V: CPT | Mod: PBBFAC,,, | Performed by: NURSE PRACTITIONER

## 2022-03-23 PROCEDURE — 99203 OFFICE O/P NEW LOW 30 MIN: CPT | Mod: S$GLB,,, | Performed by: NURSE PRACTITIONER

## 2022-03-23 PROCEDURE — 3008F BODY MASS INDEX DOCD: CPT | Mod: CPTII,S$GLB,, | Performed by: NURSE PRACTITIONER

## 2022-03-23 PROCEDURE — 3008F PR BODY MASS INDEX (BMI) DOCUMENTED: ICD-10-PCS | Mod: CPTII,S$GLB,, | Performed by: NURSE PRACTITIONER

## 2022-03-23 PROCEDURE — 87522 HEPATITIS C REVRS TRNSCRPJ: CPT | Performed by: NURSE PRACTITIONER

## 2022-03-23 PROCEDURE — 87350 HEPATITIS BE AG IA: CPT | Performed by: NURSE PRACTITIONER

## 2022-03-23 PROCEDURE — 99203 PR OFFICE/OUTPT VISIT, NEW, LEVL III, 30-44 MIN: ICD-10-PCS | Mod: S$GLB,,, | Performed by: NURSE PRACTITIONER

## 2022-03-23 PROCEDURE — 1159F MED LIST DOCD IN RCRD: CPT | Mod: CPTII,S$GLB,, | Performed by: NURSE PRACTITIONER

## 2022-03-23 PROCEDURE — 3079F PR MOST RECENT DIASTOLIC BLOOD PRESSURE 80-89 MM HG: ICD-10-PCS | Mod: CPTII,S$GLB,, | Performed by: NURSE PRACTITIONER

## 2022-03-23 PROCEDURE — 3051F PR MOST RECENT HEMOGLOBIN A1C LEVEL 7.0 - < 8.0%: ICD-10-PCS | Mod: CPTII,S$GLB,, | Performed by: NURSE PRACTITIONER

## 2022-03-23 PROCEDURE — 82105 ALPHA-FETOPROTEIN SERUM: CPT | Performed by: NURSE PRACTITIONER

## 2022-03-23 PROCEDURE — 87340 HEPATITIS B SURFACE AG IA: CPT | Performed by: NURSE PRACTITIONER

## 2022-03-23 PROCEDURE — 80321 ALCOHOLS BIOMARKERS 1OR 2: CPT | Performed by: NURSE PRACTITIONER

## 2022-03-23 PROCEDURE — 80053 COMPREHEN METABOLIC PANEL: CPT | Performed by: NURSE PRACTITIONER

## 2022-03-23 PROCEDURE — 86705 HEP B CORE ANTIBODY IGM: CPT | Performed by: NURSE PRACTITIONER

## 2022-03-23 PROCEDURE — 3051F HG A1C>EQUAL 7.0%<8.0%: CPT | Mod: CPTII,S$GLB,, | Performed by: NURSE PRACTITIONER

## 2022-03-23 PROCEDURE — 86707 HEPATITIS BE ANTIBODY: CPT | Performed by: NURSE PRACTITIONER

## 2022-03-23 PROCEDURE — 3077F SYST BP >= 140 MM HG: CPT | Mod: CPTII,S$GLB,, | Performed by: NURSE PRACTITIONER

## 2022-03-23 PROCEDURE — 99999 PR PBB SHADOW E&M-EST. PATIENT-LVL V: ICD-10-PCS | Mod: PBBFAC,,, | Performed by: NURSE PRACTITIONER

## 2022-03-23 PROCEDURE — 87389 HIV-1 AG W/HIV-1&-2 AB AG IA: CPT | Performed by: NURSE PRACTITIONER

## 2022-03-23 PROCEDURE — 86790 VIRUS ANTIBODY NOS: CPT | Performed by: NURSE PRACTITIONER

## 2022-03-23 PROCEDURE — 1159F PR MEDICATION LIST DOCUMENTED IN MEDICAL RECORD: ICD-10-PCS | Mod: CPTII,S$GLB,, | Performed by: NURSE PRACTITIONER

## 2022-03-23 PROCEDURE — 3079F DIAST BP 80-89 MM HG: CPT | Mod: CPTII,S$GLB,, | Performed by: NURSE PRACTITIONER

## 2022-03-23 PROCEDURE — 1111F PR DISCHARGE MEDS RECONCILED W/ CURRENT OUTPATIENT MED LIST: ICD-10-PCS | Mod: CPTII,S$GLB,, | Performed by: NURSE PRACTITIONER

## 2022-03-23 PROCEDURE — 36415 COLL VENOUS BLD VENIPUNCTURE: CPT | Performed by: NURSE PRACTITIONER

## 2022-03-23 PROCEDURE — 85027 COMPLETE CBC AUTOMATED: CPT | Performed by: NURSE PRACTITIONER

## 2022-03-23 NOTE — PATIENT INSTRUCTIONS
Make sure all/any sexual partners are tested   2. Labs today      -- Avoid alcohol. Avoid raw seafood.   -- Hepatitis B is spread through blood and bodily fluids. Do not share  razors/toothbrushes, etc, with others   -- it is possible that Hepatitis B can cause scar tissue in the liver, which can progress to cirrhosis.   -- Hepatitis B, in some cases, has a higher risk of liver cancer (hepatocellular carcinoma), especially with active hepatitis B infection. We will perform liver cancer screening every six months with ultrasound and AFP along with a clinic visit every 6 months  -- If you develop cirrhosis, it is important that we monitor you closely, as cirrhosis can cause liver cancer, liver failure, liver transplant, death.   -- Limit acetaminophen to 2000mg daily.  -- Recommended that all 1st degree relatives, household members and sexual contacts are screened for Hepatitis B. If they are negative for Hepatitis B, they should be vaccinated against Hepatitis B to protect themselves from rebecca the virus  -- It is important for all current and previous sexual partners to be tested for Hepatitis B as well as complete Hep B vaccination. For sexual partners who have not completed the Hep B vaccine series, barrier sexual protection is recommended to prevent spread of the virus.

## 2022-03-23 NOTE — PROGRESS NOTES
[facility] VISIT NEW PT NOTE    REFERRING PROVIDER:  James López  PCP: Wiley Piedra MD     CHIEF COMPLAINT: elevated liver enzymes likely acute Hep B    HPI: This is a 56 y.o. Black or  male with PMH noted below, presenting for evaluation of elevated liver enzymes, likely due to acute Hep B    Previous serologic w/u negative for viral Hep A and C  + Hep B    Prior serologic workup:   Lab Results   Component Value Date    HEPBSAG Positive (A) 02/20/2022    HEPCAB Negative 02/20/2022    HEPAIGM Negative 02/20/2022       Liver fibrosis staging:  -- will defer until acutely elevated liver enzymes improve     Risk factors for NAFLD include obesity, HTN, HLD, DM    Interval HPI: Presents today alone.   Risk factors for hep B include possible sexual activity (?). Otherwise no risk factors   Wife needs to be tested   No use of Hep B in the past   Taking Tylenol and Aleve OTC but nothing above the recommended doses   Also taking herbal [place]  Previously but stopped   No e studies or DNA to review  Pt reports dark urine has resolved, feeling well    Labs done 3/2022 show markedly elevated transaminase levels (ALT > AST, markedly elevated since 2/19/2022, intermittently mildly elevated since 2007)  Platelets low normal, alk phos mildly elevated  Tbili mildly elevated   Synthetic liver functioning : abnormal, elevated INR and Tbili     Lab Results   Component Value Date    ALT 1,085 (H) 03/05/2022     (H) 03/05/2022    ALKPHOS 161 (H) 03/05/2022    BILITOT 1.8 (H) 03/05/2022    ALBUMIN 3.6 03/05/2022    INR 1.5 (H) 03/05/2022     02/21/2022       Abd CT done 2/2022 showed no liver lesions, no splenomegaly    Denies family history of liver disease . Denies significant alcohol consumption currently or in the past  Social History     Substance and Sexual Activity   Alcohol Use Yes    Comment: occasional     Immunity to Hep A  - will check today        Allergy and  medication list reviewed and updated     PMHX:  has a past medical history of Diabetes mellitus, Gout, Gout attack, and Hypertension.    PSHX:  has no past surgical history on file.    FAMILY HISTORY: Updated and reviewed in [facility]    SOCIAL HISTORY:   Social History     Substance and Sexual Activity   Alcohol Use Yes    Comment: occasional       Social History     Substance and Sexual Activity   Drug Use No       ROS:   GENERAL: Denies fatigue  CARDIOVASCULAR: Denies edema  GI: Denies abdominal pain  SKIN: Denies rash, itching   NEURO: Denies confusion, memory loss, or mood changes    PHYSICAL EXAM:   Friendly Black or  male, in no acute distress; alert and oriented to person, place and time  VITALS: BP (!) 173/89 (BP Location: Right arm, Patient Position: Sitting)   Pulse 86   Temp 97 °F (36.1 °C) (Oral)   Resp 18   Ht 6' (1.829 m)   Wt 111.1 kg (244 lb 14.9 oz)   SpO2 98%   BMI 33.22 kg/m²   EYES: Sclerae anicteric  GI: Soft, non-tender, non-distended. No ascites.  EXTREMITIES:  No edema.  SKIN: Warm and dry. No jaundice. No telangectasias noted. No palmar erythema.  NEURO:  No asterixis.  PSYCH:  Thought and speech pattern appropriate. Behavior normal      EDUCATION:  See instructions discussed with patient in Instructions section of the After Visit Summary     ASSESSMENT & PLAN:  56 y.o. Black or  male with:  1. Elevated liver enzymes likely due to acute Hep B  -- Labs note elevated transaminase levels (ALT > AST), elevated since 2/2021, markedly elevated   --- synthetic liver function : abnormal, will repeat today   --- CT abd done notes no significant liver abnormality  -- do not suspect any medications contributing   --- previous serological work up : negative Hep C Ab, will also test Hep C RNA given likely recent Hep B exposure   --- Hep A and B immunity: will check today, will arrange Hep A vaccine if needed    -- labs today  Orders Placed This Encounter   Procedures     AFP Tumor Marker    CBC Without Differential    Comprehensive Metabolic Panel    Hepatitis A antibody, IgG    Hepatitis B Core Antibody, IgM    Hepatitis B e antibody    Hepatitis B e Antigen    Hepatitis B Surface Ab, Qualitative    Hepatitis B Surface Antigen    HEPATITIS B VIRAL DNA, QUANTITATIVE    Hepatitis C RNA, Quantitative, PCR    HIV 1/2 Ag/Ab (4th Gen)    Phosphatidylethanol (PETH)    Protime-INR      -- fibroscan - likely with RTC in 6 months after acute elevation resolves     2. + Hep B s Ag, first noted 2/2022  -- suspect acute Hep B given markedly elevated liver enzymes. Repeat labs, if acute, may resolve within 6 months (~9/2022)  -- will obtain HBV DNA and  e studies  -- risk factors for transmission : possible sexual exposure (?)  -- family members or partners that need to be tested : wife, any sexual partner  -- screening for Hep C and HIV with next labs   -- HCC screening Q 6 months with AFP and abd. U/S - both next due now, then will determine chronicity depending on repeat labs  -- Hep B counseling noted above discussed. Sexual partners and family members in household need to be tested and vaccinated if negative. Must use protection for sex (Hep B)       Labs today then  Follow up in about 2 weeks (around 4/6/2022).   Orders Placed This Encounter   Procedures    AFP Tumor Marker    CBC Without Differential    Comprehensive Metabolic Panel    Hepatitis A antibody, IgG    Hepatitis B Core Antibody, IgM    Hepatitis B e antibody    Hepatitis B e Antigen    Hepatitis B Surface Ab, Qualitative    Hepatitis B Surface Antigen    HEPATITIS B VIRAL DNA, QUANTITATIVE    Hepatitis C RNA, Quantitative, PCR    HIV 1/2 Ag/Ab (4th Gen)    Phosphatidylethanol (PETH)    Protime-INR        Thank you for allowing me to participate in the care of VANNESSA Orellana    I spent a total of 30 minutes on the day of the visit.This includes face to face time and  non-face to face time preparing to see the patient (eg, review of tests), obtaining and/or reviewing separately obtained history, documenting clinical information in the electronic or other health record, independently interpreting results and communicating results to the patient/family/caregiver, and coordinating care.         CC'ed note to:   Wiley Piedra MD

## 2022-03-24 LAB
HAV IGG SER QL IA: NEGATIVE
HBV CORE IGM SERPL QL IA: POSITIVE
HBV SURFACE AB SER-ACNC: NEGATIVE M[IU]/ML
HBV SURFACE AG SERPL QL IA: POSITIVE
HEPATITIS C VIRUS (HCV) RNA DETECTION/QUANTIFICATION RT-PCR: NORMAL IU/ML
HIV 1+2 AB+HIV1 P24 AG SERPL QL IA: NEGATIVE

## 2022-03-25 LAB
HBV DNA SERPL NAA+PROBE-ACNC: 345 IU/ML
HBV DNA SERPL NAA+PROBE-LOG IU: 2.54 LOG (10) IU/ML
HBV DNA SERPL QL NAA+PROBE: DETECTED
HBV E AB SER QL: REACTIVE
HBV E AG SERPL QL IA: NONREACTIVE

## 2022-03-26 LAB
PETH 16:0/18.1 (POPETH): 24 NG/ML
PETH 16:0/18.2 (PLPETH): 32 NG/ML

## 2022-04-27 ENCOUNTER — LAB VISIT (OUTPATIENT)
Dept: LAB | Facility: HOSPITAL | Age: 57
End: 2022-04-27
Payer: COMMERCIAL

## 2022-04-27 ENCOUNTER — OFFICE VISIT (OUTPATIENT)
Dept: HEPATOLOGY | Facility: CLINIC | Age: 57
End: 2022-04-27
Payer: COMMERCIAL

## 2022-04-27 VITALS
SYSTOLIC BLOOD PRESSURE: 162 MMHG | WEIGHT: 244.06 LBS | OXYGEN SATURATION: 97 % | DIASTOLIC BLOOD PRESSURE: 84 MMHG | RESPIRATION RATE: 18 BRPM | BODY MASS INDEX: 33.06 KG/M2 | HEIGHT: 72 IN | HEART RATE: 69 BPM | TEMPERATURE: 97 F

## 2022-04-27 DIAGNOSIS — E11.65 TYPE 2 DIABETES MELLITUS WITH HYPERGLYCEMIA, WITHOUT LONG-TERM CURRENT USE OF INSULIN: ICD-10-CM

## 2022-04-27 DIAGNOSIS — R74.8 ELEVATED LIVER ENZYMES: ICD-10-CM

## 2022-04-27 DIAGNOSIS — I10 HYPERTENSION, UNSPECIFIED TYPE: Chronic | ICD-10-CM

## 2022-04-27 DIAGNOSIS — R76.8 HEPATITIS B SURFACE ANTIGEN POSITIVE: Primary | ICD-10-CM

## 2022-04-27 DIAGNOSIS — R77.2 ELEVATED AFP: ICD-10-CM

## 2022-04-27 DIAGNOSIS — E78.00 PURE HYPERCHOLESTEROLEMIA: ICD-10-CM

## 2022-04-27 DIAGNOSIS — R76.8 HEPATITIS B SURFACE ANTIGEN POSITIVE: ICD-10-CM

## 2022-04-27 LAB
AFP SERPL-MCNC: 12 NG/ML (ref 0–8.4)
ALBUMIN SERPL BCP-MCNC: 3.8 G/DL (ref 3.5–5.2)
ALP SERPL-CCNC: 101 U/L (ref 55–135)
ALT SERPL W/O P-5'-P-CCNC: 83 U/L (ref 10–44)
ANION GAP SERPL CALC-SCNC: 8 MMOL/L (ref 8–16)
AST SERPL-CCNC: 65 U/L (ref 10–40)
BILIRUB SERPL-MCNC: 0.5 MG/DL (ref 0.1–1)
BUN SERPL-MCNC: 16 MG/DL (ref 6–20)
CALCIUM SERPL-MCNC: 9.4 MG/DL (ref 8.7–10.5)
CHLORIDE SERPL-SCNC: 102 MMOL/L (ref 95–110)
CO2 SERPL-SCNC: 29 MMOL/L (ref 23–29)
CREAT SERPL-MCNC: 1.1 MG/DL (ref 0.5–1.4)
EST. GFR  (AFRICAN AMERICAN): >60 ML/MIN/1.73 M^2
EST. GFR  (NON AFRICAN AMERICAN): >60 ML/MIN/1.73 M^2
GLUCOSE SERPL-MCNC: 208 MG/DL (ref 70–110)
INR PPP: 1.3 (ref 0.8–1.2)
POTASSIUM SERPL-SCNC: 4.1 MMOL/L (ref 3.5–5.1)
PROT SERPL-MCNC: 7.6 G/DL (ref 6–8.4)
PROTHROMBIN TIME: 13 SEC (ref 9–12.5)
SODIUM SERPL-SCNC: 139 MMOL/L (ref 136–145)

## 2022-04-27 PROCEDURE — 3079F PR MOST RECENT DIASTOLIC BLOOD PRESSURE 80-89 MM HG: ICD-10-PCS | Mod: CPTII,S$GLB,, | Performed by: NURSE PRACTITIONER

## 2022-04-27 PROCEDURE — 80053 COMPREHEN METABOLIC PANEL: CPT | Performed by: NURSE PRACTITIONER

## 2022-04-27 PROCEDURE — 87517 HEPATITIS B DNA QUANT: CPT | Performed by: NURSE PRACTITIONER

## 2022-04-27 PROCEDURE — 3051F PR MOST RECENT HEMOGLOBIN A1C LEVEL 7.0 - < 8.0%: ICD-10-PCS | Mod: CPTII,S$GLB,, | Performed by: NURSE PRACTITIONER

## 2022-04-27 PROCEDURE — 99214 OFFICE O/P EST MOD 30 MIN: CPT | Mod: S$GLB,,, | Performed by: NURSE PRACTITIONER

## 2022-04-27 PROCEDURE — 3051F HG A1C>EQUAL 7.0%<8.0%: CPT | Mod: CPTII,S$GLB,, | Performed by: NURSE PRACTITIONER

## 2022-04-27 PROCEDURE — 3077F PR MOST RECENT SYSTOLIC BLOOD PRESSURE >= 140 MM HG: ICD-10-PCS | Mod: CPTII,S$GLB,, | Performed by: NURSE PRACTITIONER

## 2022-04-27 PROCEDURE — 82105 ALPHA-FETOPROTEIN SERUM: CPT | Performed by: NURSE PRACTITIONER

## 2022-04-27 PROCEDURE — 3008F PR BODY MASS INDEX (BMI) DOCUMENTED: ICD-10-PCS | Mod: CPTII,S$GLB,, | Performed by: NURSE PRACTITIONER

## 2022-04-27 PROCEDURE — 3008F BODY MASS INDEX DOCD: CPT | Mod: CPTII,S$GLB,, | Performed by: NURSE PRACTITIONER

## 2022-04-27 PROCEDURE — 1160F PR REVIEW ALL MEDS BY PRESCRIBER/CLIN PHARMACIST DOCUMENTED: ICD-10-PCS | Mod: CPTII,S$GLB,, | Performed by: NURSE PRACTITIONER

## 2022-04-27 PROCEDURE — 1159F MED LIST DOCD IN RCRD: CPT | Mod: CPTII,S$GLB,, | Performed by: NURSE PRACTITIONER

## 2022-04-27 PROCEDURE — 99214 PR OFFICE/OUTPT VISIT, EST, LEVL IV, 30-39 MIN: ICD-10-PCS | Mod: S$GLB,,, | Performed by: NURSE PRACTITIONER

## 2022-04-27 PROCEDURE — 3077F SYST BP >= 140 MM HG: CPT | Mod: CPTII,S$GLB,, | Performed by: NURSE PRACTITIONER

## 2022-04-27 PROCEDURE — 87340 HEPATITIS B SURFACE AG IA: CPT | Performed by: NURSE PRACTITIONER

## 2022-04-27 PROCEDURE — 99999 PR PBB SHADOW E&M-EST. PATIENT-LVL V: CPT | Mod: PBBFAC,,, | Performed by: NURSE PRACTITIONER

## 2022-04-27 PROCEDURE — 1159F PR MEDICATION LIST DOCUMENTED IN MEDICAL RECORD: ICD-10-PCS | Mod: CPTII,S$GLB,, | Performed by: NURSE PRACTITIONER

## 2022-04-27 PROCEDURE — 85610 PROTHROMBIN TIME: CPT | Performed by: NURSE PRACTITIONER

## 2022-04-27 PROCEDURE — 36415 COLL VENOUS BLD VENIPUNCTURE: CPT | Performed by: NURSE PRACTITIONER

## 2022-04-27 PROCEDURE — 3079F DIAST BP 80-89 MM HG: CPT | Mod: CPTII,S$GLB,, | Performed by: NURSE PRACTITIONER

## 2022-04-27 PROCEDURE — 1160F RVW MEDS BY RX/DR IN RCRD: CPT | Mod: CPTII,S$GLB,, | Performed by: NURSE PRACTITIONER

## 2022-04-27 PROCEDURE — 99999 PR PBB SHADOW E&M-EST. PATIENT-LVL V: ICD-10-PCS | Mod: PBBFAC,,, | Performed by: NURSE PRACTITIONER

## 2022-04-27 NOTE — PATIENT INSTRUCTIONS
Labs monthly for 6 months  Follow up in October with labs and uS 1 week before, fibroscan same day   Wife and all sexual contacts should be screened for Hep B      -- Avoid alcohol. Avoid raw seafood  -- Hepatitis B is spread through blood and bodily fluids. Do not share  razors/toothbrushes, etc, with others   -- it is possible that Hepatitis B can cause scar tissue in the liver, which can progress to cirrhosis.   -- Hepatitis B, in some cases, has a higher risk of liver cancer (hepatocellular carcinoma), especially with active hepatitis B infection. We will perform liver cancer screening every six months with ultrasound and AFP along with a clinic visit every 6 months  -- If you develop cirrhosis, it is important that we monitor you closely, as cirrhosis can cause liver cancer, liver failure, liver transplant, death.   -- Limit acetaminophen to 2000mg daily.  -- Recommended that all 1st degree relatives, household members and sexual contacts are screened for Hepatitis B. If they are negative for Hepatitis B, they should be vaccinated against Hepatitis B to protect themselves from rebecca the virus  -- It is important for all current and previous sexual partners to be tested for Hepatitis B as well as complete Hep B vaccination. For sexual partners who have not completed the Hep B vaccine series yet, barrier sexual protection is recommended to prevent spread of the virus.

## 2022-04-28 ENCOUNTER — TELEPHONE (OUTPATIENT)
Dept: HEPATOLOGY | Facility: CLINIC | Age: 57
End: 2022-04-28
Payer: COMMERCIAL

## 2022-04-28 NOTE — TELEPHONE ENCOUNTER
Please call pt and notify him that his liver labs and tumor marker lab (AFP) are MUCH improved. Therefore, he should do his labs in July and October, but I cancelled all of the other monthly labs since things look so much better. Please make sure he is aware of what appts he still has left (the ones that still remain on his appt list - he doesn't use my [facility])      Spoke with  and he was glad to hear  Wanted the 2 lab appts slips to the confirmed address on file

## 2022-04-28 NOTE — TELEPHONE ENCOUNTER
Please call pt and notify him that his liver labs and tumor marker lab (AFP) are MUCH improved. Therefore, he should do his labs in July and October, but I cancelled all of the other monthly labs since things look so much better. Please make sure he is aware of what appts he still has left (the ones that still remain on his appt list - he doesn't use my ochnser)    Thanks !

## 2022-04-29 LAB
HBV DNA SERPL NAA+PROBE-ACNC: 74 IU/ML
HBV DNA SERPL NAA+PROBE-LOG IU: 1.87 LOG (10) IU/ML
HBV DNA SERPL QL NAA+PROBE: DETECTED

## 2022-05-06 LAB — HBV SURFACE AG SERPL QL IA: POSITIVE

## 2022-08-29 ENCOUNTER — HOSPITAL ENCOUNTER (EMERGENCY)
Facility: HOSPITAL | Age: 57
Discharge: HOME | End: 2022-08-30
Attending: EMERGENCY MEDICINE
Payer: COMMERCIAL

## 2022-08-29 DIAGNOSIS — T14.90XA TRAUMA: ICD-10-CM
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
## 2022-08-29 PROCEDURE — 99284 EMERGENCY DEPT VISIT MOD MDM: CPT | Mod: 25

## 2022-08-29 NOTE — Clinical Note
"Jerel Oswald" Ponce was seen and treated in our emergency department on 8/29/2022.  He may return to work on 09/02/2022.       If you have any questions or concerns, please don't hesitate to call.      Karina Whiteside MD"

## 2022-08-29 NOTE — Clinical Note
"Jerel Oswald" Ponce was seen and treated in our emergency department on 8/29/2022.  He may return to work on 08/30/2022.       If you have any questions or concerns, please don't hesitate to call.      Kym Baltazar RN    "

## 2022-08-30 VITALS
DIASTOLIC BLOOD PRESSURE: 76 MMHG | HEART RATE: 70 BPM | TEMPERATURE: 98 F | HEIGHT: 72 IN | BODY MASS INDEX: 32.51 KG/M2 | SYSTOLIC BLOOD PRESSURE: 118 MMHG | OXYGEN SATURATION: 98 % | RESPIRATION RATE: 20 BRPM | WEIGHT: 240 LBS
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
## 2022-08-30 NOTE — ED NOTES
Patient cleared for discharge by provider  Patient given discharge instructions and work note at patient request  Patient able to ambulate independently at discharge  Nil concerns on discharge
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
## 2022-10-20 ENCOUNTER — TELEPHONE (OUTPATIENT)
Dept: HEPATOLOGY | Facility: CLINIC | Age: 57
End: 2022-10-20
Payer: COMMERCIAL

## 2022-10-20 NOTE — TELEPHONE ENCOUNTER
Pt no showed visit and testing. Please contact pt and schedule labs and US soon, then f/u with me 1 week later with fibroscan same day    Thanks !

## 2022-10-20 NOTE — TELEPHONE ENCOUNTER
Telephone call to patient. Rescheduled labs, u/s, follow up and fibroscan. Mailed appointment letter to patient.

## 2023-01-05 NOTE — PROGRESS NOTES
[facility] VISIT FOLLOW UP NOTE    PCP: Wiley Piedra MD     CHIEF COMPLAINT: elevated liver enzymes likely acute Hep B, elevated AFP    HPI: This is a 56 y.o. Black or  male with PMH noted below, presenting for follow up of elevated liver enzymes, likely due to acute Hep B    Previous serologic w/u negative for viral Hep A and C  + Hep B    Prior serologic workup:   Lab Results   Component Value Date    HEPBSAG Positive (A) 03/23/2022    HEPCAB Negative 02/20/2022    HEPAIGM Negative 02/20/2022       Liver fibrosis staging:  -- will defer until acutely elevated liver enzymes improve, can obtain 10/2022    Risk factors for NAFLD include obesity, HTN, HLD, DM    Interval HPI: Presents today alone.   Risk factors for hep B include possible sexual activity (?). Otherwise no risk factors   Wife needs to be tested, pt reports not tested yet  No use of Hep B medications in the past   Also taking herbal [place]  Previously but stopped   Elevated AFP on labs but CT without liver lesion    Labs done 3/2022 show improving but elevated transaminase levels (ALT > AST, markedly elevated since 2/19/2022, intermittently mildly elevated since 2007)  Platelets low, alk phos elevated   Tbili normalized   Synthetic liver functioning : abnormal, elevated INR, Tbili WNL - will repeat today     Lab Results   Component Value Date     (H) 03/23/2022     (H) 03/23/2022    ALKPHOS 192 (H) 03/23/2022    BILITOT 0.9 03/23/2022    ALBUMIN 3.4 (L) 03/23/2022    INR 1.3 (H) 03/23/2022     (L) 03/23/2022     Abd CT done 2/2022 showed no liver lesions, no splenomegaly - will repeat with RTC    Denies family history of liver disease . Denies significant alcohol consumption currently or in the past  Social History     Substance and Sexual Activity   Alcohol Use Yes    Comment: occasional     Immunity to Hep A  - needs Hep A vaccine        Allergy and medication list reviewed and updated      PMHX:  has a past medical history of Diabetes mellitus, Gout, Gout attack, Hepatitis B surface antigen positive (3/23/2022), and Hypertension.    PSHX:  has a past surgical history that includes Hip surgery (Left, 2020).    FAMILY HISTORY: Updated and reviewed in [facility]    SOCIAL HISTORY:   Social History     Substance and Sexual Activity   Alcohol Use Yes    Comment: occasional       Social History     Substance and Sexual Activity   Drug Use No       ROS:   GENERAL: Denies fatigue  CARDIOVASCULAR: Denies edema  GI: Denies abdominal pain  SKIN: Denies rash, itching   NEURO: Denies confusion, memory loss, or mood changes    PHYSICAL EXAM:   Friendly Black or  male, in no acute distress; alert and oriented to person, place and time  VITALS: BP (!) 162/84 (BP Location: Right arm, Patient Position: Sitting, BP Method: Medium (Automatic))   Pulse 69   Temp 97 °F (36.1 °C) (Oral)   Resp 18   Ht 6' (1.829 m)   Wt 110.7 kg (244 lb 0.8 oz)   SpO2 97%   BMI 33.10 kg/m²   EYES: Sclerae anicteric  GI: Soft, non-tender, non-distended. No ascites.  EXTREMITIES:  No edema.  SKIN: Warm and dry. No jaundice. No telangectasias noted. No palmar erythema.  NEURO:  No asterixis.  PSYCH:  Thought and speech pattern appropriate. Behavior normal      EDUCATION:  See instructions discussed with patient in Instructions section of the After Visit Summary     ASSESSMENT & PLAN:  56 y.o. Black or  male with:  1. Elevated liver enzymes likely due to acute Hep B  -- Labs note improving but elevated transaminase levels (ALT > AST), elevated since 2/2021,  --- synthetic liver function : abnormal, will repeat today   --- CT abd done notes no significant liver abnormality  -- do not suspect any medications contributing   --- previous serological work up in [facility]  --- Hep A immunity: needs Hep A vaccine   -- labs today then monthly until Sept  Orders Placed This Encounter   Procedures    FibroScan (Vibration  Controlled Transient Elastography)    US Abdomen Complete    Comprehensive Metabolic Panel    Hepatitis B Surface Antigen    HEPATITIS B VIRAL DNA, QUANTITATIVE    AFP Tumor Marker    CBC Without Differential    Comprehensive Metabolic Panel    Hepatitis B e antibody    Hepatitis B e Antigen    Hepatitis B Surface Ab, Qualitative    Hepatitis B Surface Antigen    HEPATITIS B VIRAL DNA, QUANTITATIVE    Protime-INR    Hepatitis B Core Antibody, Total    AFP Tumor Marker    Protime-INR      -- fibroscan -  with RTC in 6 months after acute elevation resolves     2. Elevated AFP  -- normal CT 2/2022, may be related to acute Hep B but will trend monthly     3. + Hep B s Ag, first noted 2/2022  -- suspect acute Hep B given markedly elevated liver enzymes. Repeat labs, if acute, may resolve within 6 months (~9/2022)  -- Hep B , +eAb  -- risk factors for transmission : possible sexual exposure (?)  -- family members or partners that need to be tested : wife, any sexual partner - pt again reminded the importance, instructed to notify them this week   -- screening for Hep C and HIV with next labs   -- HCC screening Q 6 months with AFP and abd. U/S - AFP monthly, US with RTC  -- Hep B counseling noted above discussed. Sexual partners and family members in household need to be tested and vaccinated if negative. Must use protection for sex (Hep B)     4. Obesity, HTN, HLD, DM  Body mass index is 33.1 kg/m².  - can increase risk factors for fatty liver, will screen with fibroscan with RTC    Labs monthly then   Follow up in about 6 months (around 10/27/2022). with labs and US 1 week before, fibroscan same day   Orders Placed This Encounter   Procedures    FibroScan (Vibration Controlled Transient Elastography)    US Abdomen Complete    Comprehensive Metabolic Panel    Hepatitis B Surface Antigen    HEPATITIS B VIRAL DNA, QUANTITATIVE    AFP Tumor Marker    CBC Without Differential    Comprehensive  Metabolic Panel    Hepatitis B e antibody    Hepatitis B e Antigen    Hepatitis B Surface Ab, Qualitative    Hepatitis B Surface Antigen    HEPATITIS B VIRAL DNA, QUANTITATIVE    Protime-INR    Hepatitis B Core Antibody, Total    AFP Tumor Marker    Protime-INR        Thank you for allowing me to participate in the care of JONEL OrellanaC    I spent a total of 30 minutes on the day of the visit.This includes face to face time and non-face to face time preparing to see the patient (eg, review of tests), obtaining and/or reviewing separately obtained history, documenting clinical information in the electronic or other health record, independently interpreting results and communicating results to the patient/family/caregiver, and coordinating care.         CC'ed note to:   Dr. Alvarado       Biopsy Type: H and E

## 2023-03-25 ENCOUNTER — HOSPITAL ENCOUNTER (EMERGENCY)
Facility: HOSPITAL | Age: 58
Discharge: HOME | End: 2023-03-25
Attending: EMERGENCY MEDICINE
Payer: COMMERCIAL

## 2023-03-25 VITALS
DIASTOLIC BLOOD PRESSURE: 88 MMHG | HEIGHT: 72 IN | SYSTOLIC BLOOD PRESSURE: 166 MMHG | BODY MASS INDEX: 32.91 KG/M2 | TEMPERATURE: 98 F | OXYGEN SATURATION: 97 % | HEART RATE: 79 BPM | WEIGHT: 243 LBS | RESPIRATION RATE: 18 BRPM

## 2023-03-25 DIAGNOSIS — M25.551 RIGHT HIP PAIN: Primary | ICD-10-CM

## 2023-03-25 PROCEDURE — 99283 PR EMERGENCY DEPT VISIT,LEVEL III: ICD-10-PCS | Mod: ,,, | Performed by: EMERGENCY MEDICINE

## 2023-03-25 PROCEDURE — 99284 EMERGENCY DEPT VISIT MOD MDM: CPT

## 2023-03-25 PROCEDURE — 99283 EMERGENCY DEPT VISIT LOW MDM: CPT | Mod: ,,, | Performed by: EMERGENCY MEDICINE

## 2023-03-25 NOTE — DISCHARGE INSTRUCTIONS
TAKE TYLENOL FOR PAIN   FOLLOW UP WITH ORTHO CLINIC AND PHYSICAL THERAPY FOR CONTINUED MANAGEMENT

## 2023-03-25 NOTE — ED TRIAGE NOTES
Pt presents to the ED c/o right hip pain x 1 week. Denies fall/injury/trauma. Hx left hip replacement 2 years ago.

## 2023-03-25 NOTE — ED PROVIDER NOTES
Encounter Date: 3/25/2023       History     Chief Complaint   Patient presents with    Hip Pain     R hip pain started week ago     Mr. Serra is a 58yo M w/ PMHx HTN, HLD, T2DM, chronic gout who presents to the ED for R hip pain x1 week. Patient's occupation requires activity on his feet. He reports 1 week ago, the hip began having mild pain while at work. The pain has slightly worsened today which prompted his arrival. He denies fever/chills at home, numbness/weakness, tingling in the RLE. He denies similar pain in any other joint. Patient does report recent diarrheal illness about 1 month ago. He reports that this pain is similar but more mild than his L hip pain prior to his previous L hip replacement. He arrives to the ED AF HDS.     The history is provided by the patient.   Review of patient's allergies indicates:   Allergen Reactions    Aspirin      Past Medical History:   Diagnosis Date    Diabetes mellitus     Gout     Gout attack     Hepatitis B surface antigen positive 3/23/2022    Hypertension      Past Surgical History:   Procedure Laterality Date    HIP SURGERY Left 2020     Family History   Problem Relation Age of Onset    Hypertension Mother     Diabetes Father      Social History     Tobacco Use    Smoking status: Never    Smokeless tobacco: Never   Substance Use Topics    Alcohol use: Yes     Comment: occasional    Drug use: No     Review of Systems   Constitutional:  Negative for chills, fatigue and fever.   HENT:  Negative for congestion and rhinorrhea.    Eyes:  Negative for visual disturbance.   Respiratory:  Negative for cough and shortness of breath.    Cardiovascular:  Negative for chest pain, palpitations and leg swelling.   Gastrointestinal:  Negative for abdominal distention, abdominal pain, diarrhea, nausea and vomiting.   Genitourinary:  Negative for difficulty urinating.   Musculoskeletal:  Negative for arthralgias.        +hip pain with activity   Skin:  Negative for color change and  pallor.   Neurological:  Negative for dizziness, tremors, syncope, weakness, light-headedness and numbness.   Psychiatric/Behavioral:  Negative for agitation and behavioral problems.      Physical Exam     Initial Vitals [03/25/23 1601]   BP Pulse Resp Temp SpO2   (!) 166/88 79 18 97.6 °F (36.4 °C) 97 %      MAP       --         Physical Exam    Constitutional: He appears well-developed and well-nourished.   HENT:   Head: Normocephalic and atraumatic.   Right Ear: External ear normal.   Left Ear: External ear normal.   Nose: Nose normal.   Mouth/Throat: Oropharynx is clear and moist.   Eyes: Conjunctivae are normal.   Neck: Neck supple.   Cardiovascular:  Normal rate, regular rhythm, normal heart sounds and intact distal pulses.           Pulmonary/Chest: Breath sounds normal.   Abdominal: Abdomen is soft. Bowel sounds are normal.   Musculoskeletal:         General: No tenderness or edema.      Cervical back: Neck supple.      Comments: Pain of R hip illicited with movement and full flexion.      Neurological: He is alert and oriented to person, place, and time.   Skin: Skin is warm and dry. Capillary refill takes less than 2 seconds.   Psychiatric: He has a normal mood and affect. Thought content normal.       ED Course   Procedures  Labs Reviewed   HIV 1 / 2 ANTIBODY   HEPATITIS C ANTIBODY          Imaging Results    None          Medications - No data to display  Medical Decision Making:   Initial Assessment:   56 yo M presenting with R hip pain likely mechanical in nature. Exacerbated with activity and relieved with OTC analgesics. Denies erythema, swelling or fevers at home.   Differential Diagnosis:   Mechanical R hip pain  Clinical Tests:   Radiological Study: Ordered  ED Management:  XR hip ordered and shows severe degenerative changes of the R hip, but no fracture or dislocation. Plan for referral to orthopedic surgery for further evaluation R hip pain; likely 2/2 degenerative disease.                          Clinical Impression:   Final diagnoses:  [M25.551] Right hip pain (Primary)               Liliana Perez MD  Resident  03/25/23 7199

## 2023-06-21 DIAGNOSIS — M25.551 RIGHT HIP PAIN: Primary | ICD-10-CM

## 2023-06-30 DIAGNOSIS — Z96.642 HISTORY OF LEFT HIP REPLACEMENT: Primary | ICD-10-CM

## 2023-07-27 ENCOUNTER — TELEPHONE (OUTPATIENT)
Dept: ORTHOPEDICS | Facility: CLINIC | Age: 58
End: 2023-07-27
Payer: COMMERCIAL

## 2023-07-27 NOTE — TELEPHONE ENCOUNTER
"I called the patient today regarding his appointment. The patient NO SHOWED for his appointment today with Dr. Adames. When I called him he said that he "forgot what day of the week it is". I rescheduled the patient and offered him 9/5/2023. He said he wrote down the date and time. The patient verbalized understanding and has no further questions.       "

## 2023-08-22 ENCOUNTER — TELEPHONE (OUTPATIENT)
Dept: ORTHOPEDICS | Facility: CLINIC | Age: 58
End: 2023-08-22
Payer: COMMERCIAL

## 2023-08-22 NOTE — TELEPHONE ENCOUNTER
I called and spoke to the patient regarding the message. I informed the patient that we are getting an xray of his left hip because he has had a L TEOFILO with a different provider.    The patient verbalized understanding and has no further questions.     ----- Message from Char Choi sent at 8/22/2023  3:18 PM CDT -----  Regarding: Questions about appt  Contact: pt @[phone]  Patient is calling to get clarity on why he is scheduled for an X-Ray on his left hip on 9/5/2023 . States he is only having issues with his right hip. Please c/b Thanks

## 2023-09-03 NOTE — PROGRESS NOTES
Subjective:     HPI:   Jerel Serra is a 57 y.o. male who presents for eval R hip pain    S/p L post TEOFILO ? with Dr Durand, no records available  Denies periop complications  2-3 nights in hospital, HH PT then OP PT  L hip now doing well, no significant pain/problems/concerns    I did B ant TEOFILO for his brother, patient feels that brother recovered much faster and would like R ant TEOFILO    He is had several months of progressive right hip pain intermittent in nature moderate to severe activity-related relieved with rest.  Predominantly anterior hip and groin pain.  Denies any anterior thigh denies radicular pain or paresthesias.  Complains of stiffness in his hip which is limiting his activities    Medications: IBU 800mg 1-2x/day, no tylenol (?should avoid pending liver eval)    Injections: none     Physical Therapy: HH and OP PT after L TEOFILO, not indicated for R hip bone on bone arthritis    Assistive Devices: none    Walkin - 5 blocks    Limitations: General walking, difficulty going up/down steps, difficulty getting in/out of the car, difficulty sleeping at night , and difficulty rising from sitting       Work - cleans machines  Has a hard time getting on the floor to clean the machines  Socks and shoes      Occupation: works for [employer], sanitation - cleans machines  Has a hard time getting on the floor to clean the machines    Social support: The patient stated that they live at home with alone. The patient stated that their unknown  would be able to help take care of them if they were to have surgery.     Was  when he had his left TEOFILO   last year  Unsure who could help him with R TEOFILO, would have to talk to brother    Brother, Mr Serra, B ant TEOFILO     ROS:  The updated medical history is in the chart and has been reviewed. A review of systems is updated and there is no reported vision changes, ear/nose/mouth/throat complaints,  chest pain, shortness of breath, abdominal pain, urological  complaints, fevers or chills, psychiatric complaints. Musculoskeletal and neurologcial symptoms are as documented. All other systems are negative.      Objective:   Exam:  There were no vitals filed for this visit.  Body mass index is 30.47 kg/m².    Physical examination included assessment of the patient's general appearance with particular attention to development, nutrition, body habitus, attention to grooming, and any evidence of distress.  Constitutional: The patient is a well-developed, well-nourished patient in no acute distress.   Cardiovascular: Vascular examination included warmth and capillary refill as well inspection for edema and assessment of pedal pulses. Pulses are palpable and regular.  Musculoskeletal: Gait was assessed as to whether it was steady, non-antalgic, and/or required the use of an assist device. The patient was also asked to walk independently and get onto the examination table.  Skin: The skin was examined for any obvious rashes or lesions in the extremity.  Neurologic: Sensation is intact to light touch in the extremity. The patient has good coordination without hyperreflexia and is alert and oriented to person, place and time and has normal mood and affect.     All of the above were examined and found to be within normal limits except for the following pertinent clinical findings:    On the right antalgic gait with a limp slight Trendelenburg.  No limp nonantalgic gait on the left.  Left hip posterior hip incision well healed no groin pain with straight leg raise no pain with active passive range of motion of his hip joint still a stiff hip with 20 external 10 internal rotation.      On the right side nontender the greater trochanter.  Groin pain with active straight leg raise.  0-95 hip flexion 30 abduction 20 adduction 20 external 10 internal rotation which recreates his symptoms.  Skin is intact to the anterior hip.  1 centimeter leg length discrepancy right side short he does not  notice a difference      Imaging:    HIP R ARTHRITIS        Indication:  Right hip pain  Exam Ordered: Radiographs include an anteroposterior pelvis, an anteroposterior and lateral view of the proximal femur including the hip joint.  Details of Examination: Exam shows evidence of joint space narrowing, osteophyte formation, and subchondral sclerosis, all consistent with degenerative arthritis of the hip.  No other significant findings are noted.  Impression:  Degenerative Arthritis, Right Hip    R Tg3 bone on bone R hip arthritis, progression v prior XR  L TEOFILO appears well fixed/oriented, no change from prior XR 3/23      Assessment:       ICD-10-CM ICD-9-CM   1. Primary osteoarthritis of right hip  M16.11 715.15   2. History of left hip replacement  Z96.642 V43.64   3. Chronic viral hepatitis B without delta agent and without coma  B18.1 070.32      [facility], Dr Piedra, at [facility] seen in a year  L post TEOFILO Dr Durand, ?2021, doing well    DM, unknown control, last A1c 7.9 2/22 (8.6 12/21, >14 7/21)  Gout, on allopurinol, no attacks in 3 years  Dx rhabdomyolysis [facility] 8/20    Liver:   Admitted for liver dysfunction 2/22: elevated LFTs and AFP, left AMA  Had hepatology f/u 3/22, plan for repeat labs/scan in 6 months  Patient no show/called for appt and labs 10/22     Plan:       The above findings were discussed with patient length. We discussed the risks of conservative versus surgical management of the patients hip arthritis. Conservative management consisting of anti-inflammatory medications, weight loss, physical therapy, and activity modification was discussed at length. At this point considering the patient's level of activity, pain, and radiographic findings I recommend R ant TEOFILO when deemed medically optimized and has social support identified    Needs social support dispo,  from wife who helped him with L TEOFILO, rec talking to brother    Needs annual f/u with PCP: Recheck A1c, told him  needs to be <8 for surgery    Huyen Gant: hepatology f/u  Needs hepatology f/u, missed 10/22 follow up, needs clearance/optimization for total hip replacement  Placed hepatology referral for follow up    He should contact us when he has accomplished the above to schedule R ant TEOFILO  Get CRP/ESR pre-op (L ant TEOFILO done OSH)    Orders Placed This Encounter   Procedures    Ambulatory referral/consult to Hepatology     Standing Status:   Future     Standing Expiration Date:   10/5/2024     Referral Priority:   Routine     Referral Type:   Consultation     Referral Reason:   Specialty Services Required     Referred to Provider:   Huyen Gant NP     Requested Specialty:   Hepatology     Number of Visits Requested:   1             Past Medical History:   Diagnosis Date    Diabetes mellitus     Gout     Gout attack     Hepatitis B surface antigen positive 3/23/2022    Hypertension        Past Surgical History:   Procedure Laterality Date    HIP SURGERY Left 2020       Family History   Problem Relation Age of Onset    Hypertension Mother     Diabetes Father        Social History     Socioeconomic History    Marital status:    Tobacco Use    Smoking status: Never    Smokeless tobacco: Never   Substance and Sexual Activity    Alcohol use: Yes     Comment: occasional    Drug use: No    Sexual activity: Yes

## 2023-09-05 ENCOUNTER — TELEPHONE (OUTPATIENT)
Dept: HEPATOLOGY | Facility: CLINIC | Age: 58
End: 2023-09-05
Payer: COMMERCIAL

## 2023-09-05 ENCOUNTER — OFFICE VISIT (OUTPATIENT)
Dept: ORTHOPEDICS | Facility: CLINIC | Age: 58
End: 2023-09-05
Payer: COMMERCIAL

## 2023-09-05 ENCOUNTER — HOSPITAL ENCOUNTER (OUTPATIENT)
Dept: RADIOLOGY | Facility: HOSPITAL | Age: 58
Discharge: HOME | End: 2023-09-05
Attending: ORTHOPAEDIC SURGERY
Payer: COMMERCIAL

## 2023-09-05 VITALS — WEIGHT: 220.56 LBS | BODY MASS INDEX: 30.88 KG/M2 | HEIGHT: 71 IN

## 2023-09-05 DIAGNOSIS — M25.551 RIGHT HIP PAIN: Primary | ICD-10-CM

## 2023-09-05 DIAGNOSIS — Z96.642 HISTORY OF LEFT HIP REPLACEMENT: ICD-10-CM

## 2023-09-05 DIAGNOSIS — M25.551 RIGHT HIP PAIN: ICD-10-CM

## 2023-09-05 DIAGNOSIS — B18.1 CHRONIC VIRAL HEPATITIS B WITHOUT DELTA AGENT AND WITHOUT COMA: ICD-10-CM

## 2023-09-05 DIAGNOSIS — R76.8 HEPATITIS B SURFACE ANTIGEN POSITIVE: Primary | ICD-10-CM

## 2023-09-05 DIAGNOSIS — R77.2 ELEVATED AFP: ICD-10-CM

## 2023-09-05 DIAGNOSIS — M16.11 PRIMARY OSTEOARTHRITIS OF RIGHT HIP: Primary | ICD-10-CM

## 2023-09-05 PROCEDURE — 4010F ACE/ARB THERAPY RXD/TAKEN: CPT | Mod: CPTII,S$GLB,, | Performed by: ORTHOPAEDIC SURGERY

## 2023-09-05 PROCEDURE — 99204 PR OFFICE/OUTPT VISIT, NEW, LEVL IV, 45-59 MIN: ICD-10-PCS | Mod: S$GLB,,, | Performed by: ORTHOPAEDIC SURGERY

## 2023-09-05 PROCEDURE — 73521 X-RAY EXAM HIPS BI 2 VIEWS: CPT | Mod: 26,,, | Performed by: RADIOLOGY

## 2023-09-05 PROCEDURE — 99999 PR PBB SHADOW E&M-EST. PATIENT-LVL III: CPT | Mod: PBBFAC,,, | Performed by: ORTHOPAEDIC SURGERY

## 2023-09-05 PROCEDURE — 4010F PR ACE/ARB THEARPY RXD/TAKEN: ICD-10-PCS | Mod: CPTII,S$GLB,, | Performed by: ORTHOPAEDIC SURGERY

## 2023-09-05 PROCEDURE — 1159F PR MEDICATION LIST DOCUMENTED IN MEDICAL RECORD: ICD-10-PCS | Mod: CPTII,S$GLB,, | Performed by: ORTHOPAEDIC SURGERY

## 2023-09-05 PROCEDURE — 1159F MED LIST DOCD IN RCRD: CPT | Mod: CPTII,S$GLB,, | Performed by: ORTHOPAEDIC SURGERY

## 2023-09-05 PROCEDURE — 3008F BODY MASS INDEX DOCD: CPT | Mod: CPTII,S$GLB,, | Performed by: ORTHOPAEDIC SURGERY

## 2023-09-05 PROCEDURE — 73521 X-RAY EXAM HIPS BI 2 VIEWS: CPT | Mod: TC

## 2023-09-05 PROCEDURE — 73521 XR HIPS BILATERAL 2 VIEW INCL AP PELVIS: ICD-10-PCS | Mod: 26,,, | Performed by: RADIOLOGY

## 2023-09-05 PROCEDURE — 3008F PR BODY MASS INDEX (BMI) DOCUMENTED: ICD-10-PCS | Mod: CPTII,S$GLB,, | Performed by: ORTHOPAEDIC SURGERY

## 2023-09-05 PROCEDURE — 99999 PR PBB SHADOW E&M-EST. PATIENT-LVL III: ICD-10-PCS | Mod: PBBFAC,,, | Performed by: ORTHOPAEDIC SURGERY

## 2023-09-05 PROCEDURE — 99204 OFFICE O/P NEW MOD 45 MIN: CPT | Mod: S$GLB,,, | Performed by: ORTHOPAEDIC SURGERY

## 2023-09-05 NOTE — TELEPHONE ENCOUNTER
----- Message from Kelsey Suarez MA sent at 9/5/2023  3:29 PM CDT -----  Regarding: Patient scheduling  Good afternoon,    We saw this patient this morning. When I looked at the schedule the first available was in November. Is there anyway you guys could schedule him sooner? If so, can you give him a call and schedule?       Thank you so much!       Sincerely,   Kelsey Suarez   Medical Assistant   Phone: [phone]  Fax: [phone]

## 2023-09-05 NOTE — TELEPHONE ENCOUNTER
Dr. Roly Adames ordered that patient f/u again with hepatology.  Patient last seen by NP Alfreda 4/27/22.  Patient lost to f/u and did not complete testing ordered by the provider.  I spoke with him and scheduled him to see NP Scroggs again on 11/2/23.  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  Please advise.

## 2023-09-05 NOTE — TELEPHONE ENCOUNTER
Can you please schedule him for the following  -- labs tomorrow or Thurs  -- US soon before appt  -- fibroscan same day as appt    Thanks

## 2023-09-05 NOTE — TELEPHONE ENCOUNTER
Spoke with patient. Patient only available Friday for labs, u/s has no available til 9/13. Schedule fibroscan on the same day as his f/u appt. Remind him to fast at least 3 hrs prior to fibroscan appt.

## 2023-09-06 ENCOUNTER — LAB VISIT (OUTPATIENT)
Dept: LAB | Facility: HOSPITAL | Age: 58
End: 2023-09-06
Payer: COMMERCIAL

## 2023-09-06 DIAGNOSIS — R76.8 HEPATITIS B SURFACE ANTIGEN POSITIVE: ICD-10-CM

## 2023-09-06 DIAGNOSIS — R77.2 ELEVATED AFP: ICD-10-CM

## 2023-09-06 LAB
AFP SERPL-MCNC: 2.3 NG/ML (ref 0–8.4)
ALBUMIN SERPL BCP-MCNC: 3.9 G/DL (ref 3.5–5.2)
ALP SERPL-CCNC: 82 U/L (ref 55–135)
ALT SERPL W/O P-5'-P-CCNC: 24 U/L (ref 10–44)
ANION GAP SERPL CALC-SCNC: 9 MMOL/L (ref 8–16)
AST SERPL-CCNC: 31 U/L (ref 10–40)
BILIRUB SERPL-MCNC: 0.6 MG/DL (ref 0.1–1)
BUN SERPL-MCNC: 14 MG/DL (ref 6–20)
CALCIUM SERPL-MCNC: 9.3 MG/DL (ref 8.7–10.5)
CHLORIDE SERPL-SCNC: 101 MMOL/L (ref 95–110)
CO2 SERPL-SCNC: 27 MMOL/L (ref 23–29)
CREAT SERPL-MCNC: 1 MG/DL (ref 0.5–1.4)
ERYTHROCYTE [DISTWIDTH] IN BLOOD BY AUTOMATED COUNT: 13.4 % (ref 11.5–14.5)
EST. GFR  (NO RACE VARIABLE): >60 ML/MIN/1.73 M^2
GLUCOSE SERPL-MCNC: 269 MG/DL (ref 70–110)
HAV IGG SER QL IA: NORMAL
HBV SURFACE AB SER-ACNC: 17.21 MIU/ML
HBV SURFACE AB SER-ACNC: REACTIVE M[IU]/ML
HBV SURFACE AG SERPL QL IA: NORMAL
HCT VFR BLD AUTO: 48.8 % (ref 40–54)
HCV AB SERPL QL IA: NORMAL
HGB BLD-MCNC: 16.3 G/DL (ref 14–18)
HIV 1+2 AB+HIV1 P24 AG SERPL QL IA: NORMAL
INR PPP: 1.1 (ref 0.8–1.2)
MCH RBC QN AUTO: 30.4 PG (ref 27–31)
MCHC RBC AUTO-ENTMCNC: 33.4 G/DL (ref 32–36)
MCV RBC AUTO: 91 FL (ref 82–98)
PLATELET # BLD AUTO: 210 K/UL (ref 150–450)
PMV BLD AUTO: 12.1 FL (ref 9.2–12.9)
POTASSIUM SERPL-SCNC: 3.9 MMOL/L (ref 3.5–5.1)
PROT SERPL-MCNC: 7.4 G/DL (ref 6–8.4)
PROTHROMBIN TIME: 12.2 SEC (ref 9–12.5)
RBC # BLD AUTO: 5.37 M/UL (ref 4.6–6.2)
SODIUM SERPL-SCNC: 137 MMOL/L (ref 136–145)
WBC # BLD AUTO: 5.03 K/UL (ref 3.9–12.7)

## 2023-09-06 PROCEDURE — 87522 HEPATITIS C REVRS TRNSCRPJ: CPT | Performed by: NURSE PRACTITIONER

## 2023-09-06 PROCEDURE — 85610 PROTHROMBIN TIME: CPT | Performed by: NURSE PRACTITIONER

## 2023-09-06 PROCEDURE — 86706 HEP B SURFACE ANTIBODY: CPT | Mod: 91 | Performed by: NURSE PRACTITIONER

## 2023-09-06 PROCEDURE — 80053 COMPREHEN METABOLIC PANEL: CPT | Performed by: NURSE PRACTITIONER

## 2023-09-06 PROCEDURE — 86704 HEP B CORE ANTIBODY TOTAL: CPT | Performed by: NURSE PRACTITIONER

## 2023-09-06 PROCEDURE — 86790 VIRUS ANTIBODY NOS: CPT | Performed by: NURSE PRACTITIONER

## 2023-09-06 PROCEDURE — 87389 HIV-1 AG W/HIV-1&-2 AB AG IA: CPT | Performed by: NURSE PRACTITIONER

## 2023-09-06 PROCEDURE — 85027 COMPLETE CBC AUTOMATED: CPT | Performed by: NURSE PRACTITIONER

## 2023-09-06 PROCEDURE — 87340 HEPATITIS B SURFACE AG IA: CPT | Performed by: NURSE PRACTITIONER

## 2023-09-06 PROCEDURE — 82105 ALPHA-FETOPROTEIN SERUM: CPT | Performed by: NURSE PRACTITIONER

## 2023-09-06 PROCEDURE — 86803 HEPATITIS C AB TEST: CPT | Performed by: NURSE PRACTITIONER

## 2023-09-06 PROCEDURE — 87517 HEPATITIS B DNA QUANT: CPT | Performed by: NURSE PRACTITIONER

## 2023-09-06 PROCEDURE — 87350 HEPATITIS BE AG IA: CPT | Performed by: NURSE PRACTITIONER

## 2023-09-06 PROCEDURE — 80321 ALCOHOLS BIOMARKERS 1OR 2: CPT | Performed by: NURSE PRACTITIONER

## 2023-09-06 PROCEDURE — 86707 HEPATITIS BE ANTIBODY: CPT | Performed by: NURSE PRACTITIONER

## 2023-09-06 NOTE — TELEPHONE ENCOUNTER
Called patient. Asked him if he is able to do u/s tomorrow since there is available at 8am. Patient agreed and I have him scheduled for an u/s at 8am on 9/7.

## 2023-09-07 ENCOUNTER — HOSPITAL ENCOUNTER (OUTPATIENT)
Dept: RADIOLOGY | Facility: HOSPITAL | Age: 58
Discharge: HOME | End: 2023-09-07
Attending: NURSE PRACTITIONER
Payer: COMMERCIAL

## 2023-09-07 DIAGNOSIS — R76.8 HEPATITIS B SURFACE ANTIGEN POSITIVE: ICD-10-CM

## 2023-09-07 DIAGNOSIS — R77.2 ELEVATED AFP: ICD-10-CM

## 2023-09-07 LAB
HBV CORE AB SERPL QL IA: REACTIVE
HCV RNA SERPL QL NAA+PROBE: NOT DETECTED
HCV RNA SPEC NAA+PROBE-ACNC: NOT DETECTED IU/ML

## 2023-09-07 PROCEDURE — 76700 US ABDOMEN COMPLETE: ICD-10-PCS | Mod: 26,,, | Performed by: RADIOLOGY

## 2023-09-07 PROCEDURE — 76700 US EXAM ABDOM COMPLETE: CPT | Mod: TC

## 2023-09-07 PROCEDURE — 76700 US EXAM ABDOM COMPLETE: CPT | Mod: 26,,, | Performed by: RADIOLOGY

## 2023-09-09 LAB
HBV E AB SER QL: REACTIVE
HBV E AG SERPL QL IA: NONREACTIVE

## 2023-09-11 ENCOUNTER — TELEPHONE (OUTPATIENT)
Dept: HEPATOLOGY | Facility: CLINIC | Age: 58
End: 2023-09-11
Payer: COMMERCIAL

## 2023-09-11 LAB
CLINICAL BIOCHEMIST REVIEW: NORMAL
HBV DNA SERPL NAA+PROBE-ACNC: <10 IU/ML
HBV DNA SERPL NAA+PROBE-LOG IU: <1 LOG (10) IU/ML
HBV DNA SERPL QL NAA+PROBE: NOT DETECTED
PLPETH BLD-MCNC: 40 NG/ML
POPETH BLD-MCNC: 35 NG/ML

## 2023-09-11 NOTE — TELEPHONE ENCOUNTER
Called to see if Mr. Serra would make the appt he is scheduled for today. Or to see if he ran into traffic. No answer. Left a voice message to call back directly [phone]

## 2023-09-12 ENCOUNTER — TELEPHONE (OUTPATIENT)
Dept: ORTHOPEDICS | Facility: CLINIC | Age: 58
End: 2023-09-12
Payer: COMMERCIAL

## 2023-09-12 NOTE — TELEPHONE ENCOUNTER
----- Message from Kelsey Suarez MA sent at 9/12/2023  9:23 AM CDT -----  Regarding: FW: Patient scheduling  Hey!    Just christoph      Sincerely,   Kelsey Suarez   Medical Assistant   Phone: [phone]  Fax: [phone]    ----- Message -----  From: Huyen Gant NP  Sent: 9/12/2023   8:29 AM CDT  To: Kelsey Suarez MA  Subject: RE: Patient scheduling                           He no showed his visit so he was scheduled for the next available. Just FYREG in case anyone wonders why his appt is later  ----- Message -----  From: Kelsey Suarez MA  Sent: 9/6/2023   8:27 AM CDT  To: Huyen Gant NP  Subject: RE: Patient scheduling                           Good morning,    Thank you so much!        Sincerely,   Kelsey Suarez   Medical Assistant   Phone: [phone]  Fax: [phone]    ----- Message -----  From: Huyen Gant NP  Sent: 9/5/2023   3:49 PM CDT  To: Kelsey Suarez MA  Subject: RE: Patient scheduling                           He can do a video visit much sooner. I will have someone call him     Thanks     ----- Message -----  From: Kelsey Suarez MA  Sent: 9/5/2023   3:36 PM CDT  To: Huyen Gant NP; Keshawn Kim; #  Subject: Patient scheduling                               Good afternoon,    We saw this patient this morning. When I looked at the schedule the first available was in November. Is there anyway you guys could schedule him sooner? If so, can you give him a call and schedule?       Thank you so much!       Sincerely,   Kelsey Suarez   Medical Assistant   Phone: [phone]  Fax: [phone]

## 2023-09-22 ENCOUNTER — TELEPHONE (OUTPATIENT)
Dept: ORTHOPEDICS | Facility: CLINIC | Age: 58
End: 2023-09-22
Payer: COMMERCIAL

## 2023-09-22 NOTE — TELEPHONE ENCOUNTER
I called the patient regarding his voice. I review the requirements from Dr. Adames's note:      Social support: still working on it.   Annual PCP: Will see PCP on 10/11, no   Hepatology:  11/8    The patient verbalized understanding and has no further questions.           ----- Message from Mara Duffy sent at 9/22/2023  4:03 PM CDT -----  Regarding: Pt advice  Contact: [phone]  Pt is calling to speak with the provider about test results would like to know are they in pt states his hip is in severe pain. Please call

## 2023-09-27 NOTE — ED NOTES
APPEARANCE: Alert, oriented and in no acute distress.  CARDIAC: Normal rate and rhythm, no murmur heard.   PERIPHERAL VASCULAR: peripheral pulses present. Normal cap refill. No edema. Warm to touch.    RESPIRATORY:Normal rate and effort, breath sounds clear bilaterally throughout chest. Respirations are equal and unlabored no obvious signs of distress.  GASTRO: soft, bowel sounds normal, no tenderness, no abdominal distention. Denies N/V  MUSC: Full ROM. No bony tenderness or soft tissue tenderness. No obvious deformity. Mid sternal chest pain non radiating.   SKIN: Skin is warm and dry, normal skin turgor, mucous membranes moist.  NEURO: 5/5 strength major flexors/extensors bilaterally. Sensory intact to light touch bilaterally. Billy coma scale: eyes open spontaneously-4, oriented & converses-5, obeys commands-6. No neurological abnormalities.   MENTAL STATUS: awake, alert and aware of environment.  EYE: PERRL, both eyes: pupils brisk and reactive to light. Normal size.  ENT: EARS: no obvious drainage. NOSE: no active bleeding.        
Dr. Brantley at bedside.   
Patient placed on continuous cardiac monitor, automatic blood pressure cuff and continuous pulse oximeter.  
61F PMH CAD w/ stents, DM, HTN, PAD w/ left LE arterial stent, CVA (no residual), MI x 2 (most recent 2013), peripheral neuropathy, mild early dementia, recent R hallux amputation p/w right 3rd digit wound going on for a few weeks. Podiatry consulted for evaluation of wound, r/o gas, ro OM. WBC elevated to 12.67, ESR and CRP pending at time of initial ED consult. XR wet read showing cortical erosion and periosteal reaction at R 3rd digit concerning for OM at this time.     Plan:  - admit to medicine  - broad spectrum IV ABX (vanc/zosyn)  - will f/u XR final read  - sharp excisional debridement performed using scalpel down to and including level of dermis  - Local wound care: saline wet to dry gauze, kerlix, secured with tape  - Pt can be WBAT to R foot  - Please obtain ESR/CRP  - Recc MRI to r/o OM to R 3rd digit  - rest of care per primary team    Podiatry following. Plan d/w attending.

## 2023-11-13 ENCOUNTER — TELEPHONE (OUTPATIENT)
Dept: HEPATOLOGY | Facility: CLINIC | Age: 58
End: 2023-11-13
Payer: COMMERCIAL

## 2023-11-13 NOTE — TELEPHONE ENCOUNTER
Patient was a no-show for scheduled f/u appt with NP Scroggs and fibroscan on 11/8/23.  Attempt made to reach him for rescheduling.  I lvm asking that he call hepatology back.

## 2023-11-17 ENCOUNTER — TELEPHONE (OUTPATIENT)
Dept: HEPATOLOGY | Facility: CLINIC | Age: 58
End: 2023-11-17
Payer: COMMERCIAL

## 2023-11-17 NOTE — TELEPHONE ENCOUNTER
Called patient. No answer. Left Vm stating the appointments that I set up for him. Asking to call us back to confirm his appointments. Mailed appointment reminder to patient.

## 2023-11-17 NOTE — TELEPHONE ENCOUNTER
Pt is overdue for f/u, please contact pt to schedule f/u with me in Nov or Dec with fibroscan same day Thanks !

## 2024-01-30 ENCOUNTER — TELEPHONE (OUTPATIENT)
Dept: ORTHOPEDICS | Facility: CLINIC | Age: 59
End: 2024-01-30
Payer: COMMERCIAL

## 2024-01-30 NOTE — TELEPHONE ENCOUNTER
Told pt what he needs to do in order to able to see Dr. Adames. Such as getting a hepatology appt and getting us the A1c records from his primary care

## 2024-02-05 ENCOUNTER — TELEPHONE (OUTPATIENT)
Dept: HEPATOLOGY | Facility: CLINIC | Age: 59
End: 2024-02-05
Payer: COMMERCIAL

## 2024-02-05 DIAGNOSIS — R76.8 HEPATITIS B SURFACE ANTIGEN POSITIVE: Primary | ICD-10-CM

## 2024-02-05 DIAGNOSIS — R74.8 ELEVATED LIVER ENZYMES: ICD-10-CM

## 2024-02-05 NOTE — TELEPHONE ENCOUNTER
Returned pts call. Pt needed to schedule missed appts. Vu and f/u visit scheduled w/ fs scheduled as well.  Advised pt I would send a message to provider, if there are any other test recommended. Pt vu     ----- Message from Bryan Ferguson MA sent at 2/5/2024  7:26 AM CST -----  Regarding: FW: Schedule appt  Contact: Jerel    ----- Message -----  From: Shey Jon  Sent: 1/30/2024   2:26 PM CST  To: [facility] Hepatology Scheduling  Subject: Schedule appt                                    Patient Status: Np        Scheduling Appt: missed appts        Time/Date Preference:First available        Contact Preference?: [phone] (home)           Treating Provider: Huyen Gant           Additional Notes: Pt is calling to schedule an appt. Pt states he work at night if they don't reach him, Pt states that he is off on  Wednesday. Requesting a call back.

## 2024-02-08 ENCOUNTER — TELEPHONE (OUTPATIENT)
Dept: ORTHOPEDICS | Facility: CLINIC | Age: 59
End: 2024-02-08
Payer: COMMERCIAL

## 2024-02-08 DIAGNOSIS — R74.8 ELEVATED LIVER ENZYMES: ICD-10-CM

## 2024-02-08 DIAGNOSIS — R76.8 HEPATITIS B SURFACE ANTIGEN POSITIVE: Primary | ICD-10-CM

## 2024-02-08 NOTE — TELEPHONE ENCOUNTER
THx! Can you schedule him for labs and US 1 week before his visit, then fibroscan same day as his visit?     Thanks !

## 2024-02-08 NOTE — TELEPHONE ENCOUNTER
Called pt and pt stated that he hasn't gone to his hepatologist, but he did go to his primary care doctor and got all the test done. I sent him our fax number and who to send it too. He will give that to his primary care doctor and they will hopefully fax over his info.

## 2024-02-15 ENCOUNTER — TELEPHONE (OUTPATIENT)
Dept: ORTHOPEDICS | Facility: CLINIC | Age: 59
End: 2024-02-15
Payer: COMMERCIAL

## 2024-02-15 NOTE — TELEPHONE ENCOUNTER
I called the patient today regarding his appointment. The patient sent in recent labs. He said most recent A1c was above 9. He hasn't seen hepatology. He is going to go see his PCP for clearance and will schedule an appointment with his hepatologist. The patient verbalized understanding and has no further questions.

## 2024-02-23 ENCOUNTER — TELEPHONE (OUTPATIENT)
Dept: HEPATOLOGY | Facility: CLINIC | Age: 59
End: 2024-02-23
Payer: COMMERCIAL

## 2024-02-23 NOTE — TELEPHONE ENCOUNTER
Returned pts call. Pt wanted to discuss appts. Appts discussed and pt vu. No further questions     ----- Message from Shey Jon sent at 2/23/2024  9:41 AM CST -----  Regarding: Earlier call  Contact: Rocky  Consult/Advisory     Name Of Caller:Teandrei CHUCK Serra          Contact Preference:[phone] (home)        Nature of call:Pt is returning call . In regards to an earlier appt.  Would like to accept. Please advise. Requesting a call back.

## 2024-06-19 ENCOUNTER — HOSPITAL ENCOUNTER (OUTPATIENT)
Dept: RADIOLOGY | Facility: HOSPITAL | Age: 59
Discharge: HOME | End: 2024-06-19
Attending: NURSE PRACTITIONER
Payer: COMMERCIAL

## 2024-06-19 DIAGNOSIS — R74.8 ELEVATED LIVER ENZYMES: ICD-10-CM

## 2024-06-19 DIAGNOSIS — R76.8 HEPATITIS B SURFACE ANTIGEN POSITIVE: ICD-10-CM

## 2024-06-19 PROCEDURE — 76700 US EXAM ABDOM COMPLETE: CPT | Mod: TC

## 2024-06-19 PROCEDURE — 76700 US EXAM ABDOM COMPLETE: CPT | Mod: 26,,, | Performed by: RADIOLOGY

## 2024-06-26 ENCOUNTER — TELEPHONE (OUTPATIENT)
Dept: HEPATOLOGY | Facility: CLINIC | Age: 59
End: 2024-06-26
Payer: COMMERCIAL

## 2024-06-26 NOTE — TELEPHONE ENCOUNTER
Returned pts call. Pt needed pt r/s appts for today. Vu and appts r/s. All questions answered   ----- Message from Brandi Moore sent at 6/26/2024 10:10 AM CDT -----  Regarding: Appt  Contact: Pt  [phone]                Current Appt date:  06/26/24     Type of Appt:  Fibroscan,  EP      Physician:  Huyen Gant NP    Reason for rescheduling:due to work schedule prefer appts on Wednesdays     Caller:  Jerel      Contact Preference: [phone]

## 2024-07-09 ENCOUNTER — TELEPHONE (OUTPATIENT)
Dept: HEPATOLOGY | Facility: CLINIC | Age: 59
End: 2024-07-09
Payer: COMMERCIAL

## 2024-07-09 NOTE — TELEPHONE ENCOUNTER
"Reached out to pt in regards to scheduling. Pt vu and appt for fs scheduled. No further questions   ----- Message from Huyen Gant NP sent at 7/9/2024  9:17 AM CDT -----  Regarding: pt wants appt for tomorrow?  Message requesting sooner appt, cancellation tomorrow, appt booked. Please call pt and notify him of available appt tomorrow. If he is interested, please reschedule fibroscan for before appt tomorrow    Thanks !  ----- Message -----  From: Lissette Sharma MA  Sent: 7/5/2024   3:00 PM CDT  To: Huyen Gant NP      ----- Message -----  From: Kota Moya  Sent: 7/5/2024   1:21 PM CDT  To: Alfreda Matson Staff    Reschedule Existing Appointment    Appt Date: 11/6    Type of appt: HEP FIBROSCAN [2802]; F/u    Physician: Alfreda    Reason for rescheduling? Appt date too far away;  Requesting to r/s for sooner appt date (Wednesday most preferred, but any morning time appt is okay    Caller: Self    Contact Preference: [phone]     Additional Information:  "Thank you for all that you do for our patients"  "

## 2024-07-10 ENCOUNTER — PROCEDURE VISIT (OUTPATIENT)
Dept: HEPATOLOGY | Facility: CLINIC | Age: 59
End: 2024-07-10
Payer: COMMERCIAL

## 2024-07-10 ENCOUNTER — OFFICE VISIT (OUTPATIENT)
Dept: HEPATOLOGY | Facility: CLINIC | Age: 59
End: 2024-07-10
Payer: COMMERCIAL

## 2024-07-10 ENCOUNTER — TELEPHONE (OUTPATIENT)
Dept: HEPATOLOGY | Facility: CLINIC | Age: 59
End: 2024-07-10

## 2024-07-10 VITALS — BODY MASS INDEX: 32.37 KG/M2 | WEIGHT: 231.25 LBS | HEIGHT: 71 IN

## 2024-07-10 DIAGNOSIS — Z23 NEED FOR HEPATITIS A VACCINATION: ICD-10-CM

## 2024-07-10 DIAGNOSIS — R74.8 ELEVATED LIVER ENZYMES: ICD-10-CM

## 2024-07-10 DIAGNOSIS — K76.0 METABOLIC DYSFUNCTION-ASSOCIATED STEATOTIC LIVER DISEASE (MASLD): Primary | ICD-10-CM

## 2024-07-10 DIAGNOSIS — E11.65 TYPE 2 DIABETES MELLITUS WITH HYPERGLYCEMIA, WITHOUT LONG-TERM CURRENT USE OF INSULIN: ICD-10-CM

## 2024-07-10 DIAGNOSIS — R76.8 HEPATITIS B SURFACE ANTIGEN POSITIVE: ICD-10-CM

## 2024-07-10 DIAGNOSIS — R76.8 HEPATITIS B CORE ANTIBODY POSITIVE: ICD-10-CM

## 2024-07-10 DIAGNOSIS — K74.01 HEPATIC FIBROSIS, EARLY FIBROSIS: ICD-10-CM

## 2024-07-10 DIAGNOSIS — I10 PRIMARY HYPERTENSION: ICD-10-CM

## 2024-07-10 DIAGNOSIS — K76.0 FATTY LIVER: ICD-10-CM

## 2024-07-10 PROBLEM — R77.2 ELEVATED AFP: Status: RESOLVED | Noted: 2022-04-27 | Resolved: 2024-07-10

## 2024-07-10 PROCEDURE — 1159F MED LIST DOCD IN RCRD: CPT | Mod: CPTII,S$GLB,, | Performed by: NURSE PRACTITIONER

## 2024-07-10 PROCEDURE — 3008F BODY MASS INDEX DOCD: CPT | Mod: CPTII,S$GLB,, | Performed by: NURSE PRACTITIONER

## 2024-07-10 PROCEDURE — 99214 OFFICE O/P EST MOD 30 MIN: CPT | Mod: S$GLB,,, | Performed by: NURSE PRACTITIONER

## 2024-07-10 PROCEDURE — 99999 PR PBB SHADOW E&M-EST. PATIENT-LVL IV: CPT | Mod: PBBFAC,,, | Performed by: NURSE PRACTITIONER

## 2024-07-10 PROCEDURE — 4010F ACE/ARB THERAPY RXD/TAKEN: CPT | Mod: CPTII,S$GLB,, | Performed by: NURSE PRACTITIONER

## 2024-07-10 PROCEDURE — 91200 LIVER ELASTOGRAPHY: CPT | Mod: S$GLB,,, | Performed by: NURSE PRACTITIONER

## 2024-07-10 PROCEDURE — 1160F RVW MEDS BY RX/DR IN RCRD: CPT | Mod: CPTII,S$GLB,, | Performed by: NURSE PRACTITIONER

## 2024-07-10 NOTE — PROGRESS NOTES
[facility] VISIT FOLLOW UP NOTE    PCP: Wiley Piedra MD     CHIEF COMPLAINT: previous acute Hep B, liver fibrosis on fibroscan, fatty liver     HPI: This is a 58 y.o. Black or  male with PMH noted below, presenting for follow up of above    Previous serologic w/u negative for viral Hep A and C  + Hep B core ab, negative sAg    Prior serologic workup:   Lab Results   Component Value Date    HEPBSAG Non-reactive 06/19/2024    HEPCAB Non-reactive 09/06/2023    HEPAIGM Negative 02/20/2022       Liver fibrosis staging:  -- fibroscan 7/2024 noted F2, S2 (kPA 7.7, )    Risk factors for NAFLD include obesity, HTN, HLD, DM    Interval HPI: Presents today alone. Last seen in 2022, lost to f/u since  Previous acute Hep B has cleared   F2 on fibroscan, needs diet changes and weight loss  + SSB, Fried/fast food   Drinks 1 gallon of milk in a week  Previous AFP, now WNL    Labs done 6/2024 show normal transaminase levels      Lab Results   Component Value Date    ALT 28 06/19/2024    AST 35 06/19/2024    ALKPHOS 72 06/19/2024    BILITOT 0.3 06/19/2024    ALBUMIN 4.1 06/19/2024    INR 1.1 09/06/2023     06/19/2024     Abd US done 6/2024 showed hepatomegaly, no splenomegaly     Lab Results   Component Value Date    AFP <2.0 06/19/2024         Denies family history of liver disease . Denies significant alcohol consumption currently or in the past  Social History     Substance and Sexual Activity   Alcohol Use Yes    Comment: occasional     Immunity to Hep A  - needs Hep A vaccine, sent to [facility] pharm        Allergy and medication list reviewed and updated     PMHX:  has a past medical history of Diabetes mellitus, Gout, Gout attack, Hepatitis B surface antigen positive (3/23/2022), and Hypertension.    PSHX:  has a past surgical history that includes Hip surgery (Left, 2020).    FAMILY HISTORY: Updated and reviewed in [facility]    SOCIAL HISTORY:   Social History     Substance and  "Sexual Activity   Alcohol Use Yes    Comment: occasional       Social History     Substance and Sexual Activity   Drug Use No       ROS:   GENERAL: Denies fatigue  CARDIOVASCULAR: Denies edema  GI: Denies abdominal pain  SKIN: Denies rash, itching   NEURO: Denies confusion, memory loss, or mood changes    PHYSICAL EXAM:   Friendly Black or  male, in no acute distress; alert and oriented to person, place and time  VITALS: Ht 5' 11" (1.803 m)   Wt 104.9 kg (231 lb 4.2 oz)   BMI 32.25 kg/m²   EYES: Sclerae anicteric  GI: Soft, non-tender, non-distended. No ascites.  EXTREMITIES:  No edema.  SKIN: Warm and dry. No jaundice. No telangectasias noted. No palmar erythema.  NEURO:  No asterixis.  PSYCH:  Thought and speech pattern appropriate. Behavior normal      EDUCATION:  See instructions discussed with patient in Instructions section of the After Visit Summary     ASSESSMENT & PLAN:  58 y.o. Black or  male with:  1. Liver fibrosis   F2 on fibroscan, will repeat in 3-4 months after diet/weight loss     2. Metabolic associated steatotic liver disease   - see HPI  -- Immunity to Hep A and B : see HPI  -- Recommendations discussed with patient:  Limit alcohol consumption, avoid given fibrosis   2 Weight loss goal of 20 lbs  3. Low carb/sugar, high fiber and protein diet, limit your carb intake to LESS than 30-45 grams of carbs with a meal or LESS than 5-10 grams with any snack   4. Exercise, 5 days per week, 30 minutes per day, as tolerated  5. Recommend good cholesterol, blood pressure, blood sugar levels   6. There is a new medication called Rezdiffra for the treatment of F2-F3 liver scarring due to fatty liver. It is only indicated for those with stage 2 or 3 scar tissue (will reassess after repeat fibroscan)    2. Obesity, HTN, DM   -- Body mass index is 32.25 kg/m².   -- increases risk for fatty liver    3. Hep B core ab positive  Negative sAg, suggests previous exposure but no " chronic/active Hep B. At risk for reactivation with any immunosuppression medication, steroids, chemo, etc.     4. Need for hip surgery  No contraindication from liver standpoint       Follow up in about 3 months (around 10/10/2024). with fibroscan same day   Orders Placed This Encounter   Procedures    FibroScan Transplant Hepatology(Vibration Controlled Transient Elastography)        Thank you for allowing me to participate in the care of JONEL OrellanaC    I spent a total of 30 minutes on the day of the visit.This includes face to face time and non-face to face time preparing to see the patient (eg, review of tests), obtaining and/or reviewing separately obtained history, documenting clinical information in the electronic or other health record, independently interpreting results and communicating results to the patient/family/caregiver, and coordinating care.         CC'ed note to:   Roly Adames (ortho)

## 2024-07-10 NOTE — TELEPHONE ENCOUNTER
Returned pts call. Pt did not answer, left vm for pt to give the office a call back   ----- Message from Huyen Gant NP sent at 7/10/2024 10:48 AM CDT -----  Regarding: RE: Clearance for Hip replacement  Contact: pt @ [phone]  I saw him this AM and sent a message to Dr. Adames that he was cleared from a liver standpoint. I had already told pt at his visit so not sure if he has any other questions? Can you see if he needs anything else?     Thanks!  ----- Message -----  From: Deb Holley MA  Sent: 7/10/2024  10:46 AM CDT  To: Huyen Gant NP  Subject: FW: Clearance for Hip replacement                Please advise  ----- Message -----  From: Sarah Bianchi  Sent: 7/10/2024  10:44 AM CDT  To: Alfreda Matson Staff  Subject: Clearance for Hip replacement                    Pt is calling to get clearance for hip replacement sent to Dr Adames office. Asking for a call back

## 2024-07-10 NOTE — PROCEDURES
FibroScan [place] (Vibration Controlled Transient Elastography)    Date/Time: 7/10/2024 9:45 AM    Performed by: Huyen Gant NP  Authorized by: Huyen Gant NP    Probe:  XL    Universal Protocol: Patient's identity, procedure and site were verified, confirmatory pause was performed.  Discussed procedure including risks and potential complications.  Questions answered.  Patient verbalizes understanding and wishes to proceed with VCTE.     Procedure: After providing explanations of the procedure, patient was placed in the supine position with right arm in maximum abduction to allow optimal exposure of right lateral abdomen.  Patient was briefly assessed, Testing was performed in the mid-axillary location, 50Hz Shear Wave pulses were applied and the resulting Shear Wave and Propagation Speed detected with a 3.5 MHz ultrasonic signal, using the FibroScan probe, Skin to liver capsule distance and liver parenchyma were accessed during the entire examination with the FibroScan probe, Patient was instructed to breathe normally and to abstain from sudden movements during the procedure, allowing for random measurements of liver stiffness. At least 10 Shear Waves were produced, Individual measurements of each Shear Wave were calculated.  Patient tolerated the procedure well with no complications.  Meets discharge criteria as was dismissed.  Rates pain 0 out of 10.  Patient will follow up with ordering provider to review results.    Findings  Median liver stiffness score:  7.7  CAP Reading: dB/m:  267    IQR/med %:  5  Interpretation  Fibrosis interpretation is based on medial liver stiffness - Kilopascal (kPa).    Fibrosis Stage:  F2  Steatosis interpretation is based on controlled attenuation parameter - (dB/m).    Steatosis Grade:  S2

## 2024-07-10 NOTE — PATIENT INSTRUCTIONS
1. Fibroscan to look for fat or scar tissue in the liver showed ~40% fat in the liver, stage 2 scar tissue   2. Recommend vaccines for Hepatitis  A, see below   3. Follow up in 3 months with fibroscan same day     These things are important to improve fatty liver:  Limit alcohol consumption, avoid given scar tissue   2 Weight loss goal of 20 lbs. [facility] offers benefits to patients so let me know if you want a referral to the [facility] gym. Also, [facility] has dieticians that can create a weight loss plan. If you are interested let me know and I can place a referral. If so, contact the dietician team at [facility] at email [email or web address] or call [phone] to get scheduled. They do offer video visits   3. Avoid processed foods. No fried/fast foods. No sugary drinks or drinks with any calories.   4. Low carb/sugar and high protein diet (~1 g/kg/day of protein).Try to limit your carb intake to LESS than 30-45 grams of carbs with a meal or LESS than 5-10 grams with any snack (total of any snack foods eaten during that time). Use Saharey Pal or Lose It tim to add up your carbs through the day. Do NOT drink any beverages with calories or carbs (this can lead to high blood sugar and weight gain). The main thing to focus on is high protein, low carb)  5. Exercise, 5 days per week, 30 minutes per day, as tolerated  6. Recommend good cholesterol, blood pressure, blood sugar levels   7. There is a new medication called Rezdiffra for the treatment of F2-F3 liver scarring due to fatty liver. It is only indicated for patients with significant scar tissue from fatty liver (will reassess after repeat fibroscan)    In some people, fatty liver can progress to steatohepatitis (inflamatory fatty liver) and possibly to cirrhosis, increasing the risk for liver cancer, liver failure, and death. Therefore, the lifestyle changes are very important to decrease this risk.    HEP  B EXPOSURE  Your labs show that it is possible you were exposed to Hepatitis B and your body likely cleared the active Hepatitis B virus. However, it never fulls clears and it is housed in the liver. This typically doesn't cause any chronic (long standing) Hepatitis B. However, the Hepatitis B can flare, especially if any medications are prescribed to you in the future that can affect your immune system, such as high dose steroids, immunologic medications for rheumatologic disease, chemotherapy, etc.     If any of the above medications are ever prescribed to you, it is important for you to tell your healthcare provider that you have been exposed to Hepatitis B in the past, so are at risk for it flaring         HEP A VACCINE  Your labs show that you DO have immunity against Hep B (+ Hep B surface antibody), so no further vaccine needed for Hep B    However, your immunity markers show that you do NOT have immunity against Hepatitis A, so I recommend that you receive the Hepatitis A vaccine. Hep A can be transmitted through food and water and can cause significant liver injury. There was previously a significant Hepatitis A outbreak in [place]. The most common ways to contract Hepatitis A are from eating at restaurants or with groups of people, sharing food/drinks with others, or some food in the grocery store.   The vaccine will protect your liver from the virus.  The vaccine series is 2 vaccines: one now and one 6 months after the 1st one. I sent the vaccine to the [facility]. I recommend calling them in a couple of days to see if the vaccine is covered by your insurance and arrange the vaccines if they are covered. Their number is [phone]  .      If the vaccine is not covered at the pharmacy level, I sent an order that you can get the vaccine in the infectious disease department at [facility]. If that is the case, please call the injection line at [phone]  to schedule your vaccine  administration appointment in the infectious disease department.  If you need to proceed with vaccines with the infectious disease department, you can call to obtain a cost for these vaccines before you proceed, you can call the [facility] office at [phone] or [phone]

## 2024-07-11 ENCOUNTER — TELEPHONE (OUTPATIENT)
Dept: ORTHOPEDICS | Facility: CLINIC | Age: 59
End: 2024-07-11
Payer: COMMERCIAL

## 2024-07-11 NOTE — TELEPHONE ENCOUNTER
I called the patient today regarding his surgery. I left a message for the patient to call me back. I left my name and phone number.        ----- Message from Roly Adames III, MD sent at 7/10/2024 11:58 AM CDT -----  Regarding: TEOFILO  Looks like we saw him in the fall told him A1c<8 and liver clearance  Looks like A1c now 7 and liver team says ok  Can you reach out to him and see if he wants to proceed?  ----- Message -----  From: Huyen Gant NP  Sent: 7/10/2024  10:11 AM CDT  To: Roly Adames III, MD    No contraindication for surgery from a liver standpoint

## 2024-07-11 NOTE — TELEPHONE ENCOUNTER
I called the patient today regarding his voice message. The patient is scheduled to see Dr. Adames 8/16. Will tentatively have surgery planned for 9/16 . The patient verbalized understanding and has no further questions.         ----- Message from Sahra Nelson sent at 7/11/2024  9:51 AM CDT -----  Pt returning steve to clinic regarding procedure , please call     Confirmed patient's contact info below:  Contact Name: Jerel Serra  Phone Number: [phone]

## 2024-07-26 ENCOUNTER — TELEPHONE (OUTPATIENT)
Dept: ORTHOPEDICS | Facility: CLINIC | Age: 59
End: 2024-07-26
Payer: COMMERCIAL

## 2024-07-26 DIAGNOSIS — M25.551 RIGHT HIP PAIN: Primary | ICD-10-CM

## 2024-07-26 NOTE — TELEPHONE ENCOUNTER
----- Message from Nano Kumar sent at 7/26/2024  7:56 AM CDT -----       Nature of Call:  requesting sooner surgery date     Best Call Back Number: [phone]     Additional Information:  Jerel Serra calling regarding Patient Advice for 3anting to speak to Candelario about possibly moving the surgery date that was suggested for 09/16/ to sooner due to the pain that the PT is having, call back to assist with this request

## 2024-07-26 NOTE — TELEPHONE ENCOUNTER
I called the patient today regarding his voice message. Patient is rescheduled for his appointment with Dr. Adames to on 8/6. Held sx date of 9/16 but can go sooner if he wants . The patient verbalized understanding and has no further questions.

## 2024-07-30 ENCOUNTER — TELEPHONE (OUTPATIENT)
Dept: ORTHOPEDICS | Facility: CLINIC | Age: 59
End: 2024-07-30
Payer: COMMERCIAL

## 2024-07-30 NOTE — TELEPHONE ENCOUNTER
I called the patient today regarding his voice message. I left a message for the patient to call me back. I left my name and phone number.      ----- Message from Saulo Baires sent at 7/29/2024 10:26 AM CDT -----  Regarding: PT IS CALLING TO SEE IF DISABILITY PAPERWORK WAS RECEIVED BY STAFF??  Contact: PT  Pt received a call from employer stating that it was faxed over.     Confirmed contact info below:  Contact Name: Teandrei CHUCK Serra  Phone Number: [phone]

## 2024-07-30 NOTE — TELEPHONE ENCOUNTER
Spoke to pt and informed pt that we did receive his disability information, but he needed to send it to our disability department. I informed the pt that I successfully sent over the information to the disability department and to go ahead and give them a call in the morning to check on the status of that. I gave pt the disability department number already. ([phone])    Sincerely,  Rachel Fox, [facility]   Certified Clinical Medical Assistant to Dr. Roly cohenl  Phone: [phone]  Fax: [phone]      ----- Message from Keshawn Kim sent at 7/30/2024  2:53 PM CDT -----  Regarding: FW: rESULTS/Pt Advice  Contact: Rochelle @[phone]  Good afternoon,     Do you mind calling them and have them send it to the disability office?       [facility]    Phone: [phone]  Fax: [phone]  Email: [email or web address]  Location: [address] [place] [address] (located on the 1st floor right next to the Blood Bank)    [facility]: Release of Information Department    Phone: [phone]  Fax: [phone]  Location: [address] [place] [address]      Sincerely,   Candelario Kim MS, OTC  OR & Clinical Assistant to Dr. Roly Adames III  Phone: [phone]  Fax: [phone]  ----- Message -----  From: Roman Shrestha  Sent: 7/30/2024   2:41 PM CDT  To: [facility] Staff  Subject: rESULTS/Pt Advice                                Rochelle Minor is calling to confirm receipt of documents sent for disability, please call Rochelle @[phone]/ [phone] FAX

## 2024-07-31 ENCOUNTER — TELEPHONE (OUTPATIENT)
Dept: ORTHOPEDICS | Facility: CLINIC | Age: 59
End: 2024-07-31
Payer: COMMERCIAL

## 2024-07-31 NOTE — TELEPHONE ENCOUNTER
I faxed the information to the disability office on yesterday and informed the pt as well. I did call Ms. Byrd back since she called earlier about the paperwork, but she is gone for the day and the lady I just spoke with not to long ago stated that their is not much of anything she can really do to let me know why Ruiz called because she did not document anything so she can not really release information, so she just left a message for Ruiz to give us a call back tomorrow.     Sincerely,  Rachel Fox, [facility]   Certified Clinical Medical Assistant to Dr. Roly Adames [facility]  Phone: [phone]  Fax: [phone]    ----- Message from Keshawn Kim sent at 7/31/2024  3:46 PM CDT -----  Regarding: FW: PT'S DISABILITY STAFF IS CALLING REGARDING PAPERWORK THAT WAS FAXED TO STAFF REGARDING PT  Contact: Ruiz  Good afternoon,     Please have him call and send any forms to the disability office.       [facility]    Phone: [phone]  Fax: [phone]  Email: [email or web address]  Location: [address] [place] [address] (located on the 1st floor right next to the Blood Bank)      Sincerely,   Candelario Kim MS, OTC  OR & Clinical Assistant to Dr. Roly Adames III  Phone: [phone]  Fax: [phone]  ----- Message -----  From: Saulo Baires  Sent: 7/31/2024   1:02 PM CDT  To: [facility] Staff  Subject: PT'S DISABILITY STAFF IS CALLING REGARDING P#    7/29/2024 paperwork was faxed.     RUIZ calling  from disability office   [phone]

## 2024-08-06 ENCOUNTER — HOSPITAL ENCOUNTER (OUTPATIENT)
Dept: RADIOLOGY | Facility: HOSPITAL | Age: 59
Discharge: HOME | End: 2024-08-06
Attending: ORTHOPAEDIC SURGERY
Payer: COMMERCIAL

## 2024-08-06 ENCOUNTER — OFFICE VISIT (OUTPATIENT)
Dept: ORTHOPEDICS | Facility: CLINIC | Age: 59
End: 2024-08-06
Payer: COMMERCIAL

## 2024-08-06 VITALS — BODY MASS INDEX: 31.2 KG/M2 | HEIGHT: 71 IN | WEIGHT: 222.88 LBS

## 2024-08-06 DIAGNOSIS — M16.11 PRIMARY OSTEOARTHRITIS OF RIGHT HIP: Primary | ICD-10-CM

## 2024-08-06 DIAGNOSIS — Z96.642 HISTORY OF LEFT HIP REPLACEMENT: ICD-10-CM

## 2024-08-06 DIAGNOSIS — M16.11 PRIMARY OSTEOARTHRITIS OF RIGHT HIP: ICD-10-CM

## 2024-08-06 DIAGNOSIS — M25.551 RIGHT HIP PAIN: ICD-10-CM

## 2024-08-06 DIAGNOSIS — Z01.818 PRE-OP TESTING: ICD-10-CM

## 2024-08-06 DIAGNOSIS — E11.65 TYPE 2 DIABETES MELLITUS WITH HYPERGLYCEMIA, WITHOUT LONG-TERM CURRENT USE OF INSULIN: Primary | ICD-10-CM

## 2024-08-06 DIAGNOSIS — M79.609 PAIN IN EXTREMITY, UNSPECIFIED EXTREMITY: ICD-10-CM

## 2024-08-06 DIAGNOSIS — Z96.642 HISTORY OF LEFT HIP REPLACEMENT: Primary | ICD-10-CM

## 2024-08-06 PROCEDURE — 73502 X-RAY EXAM HIP UNI 2-3 VIEWS: CPT | Mod: TC,RT

## 2024-08-06 PROCEDURE — 4010F ACE/ARB THERAPY RXD/TAKEN: CPT | Mod: CPTII,S$GLB,, | Performed by: ORTHOPAEDIC SURGERY

## 2024-08-06 PROCEDURE — 99214 OFFICE O/P EST MOD 30 MIN: CPT | Mod: S$GLB,,, | Performed by: ORTHOPAEDIC SURGERY

## 2024-08-06 PROCEDURE — 99999 PR PBB SHADOW E&M-EST. PATIENT-LVL III: CPT | Mod: PBBFAC,,, | Performed by: ORTHOPAEDIC SURGERY

## 2024-08-06 PROCEDURE — 73502 X-RAY EXAM HIP UNI 2-3 VIEWS: CPT | Mod: 26,RT,, | Performed by: INTERNAL MEDICINE

## 2024-08-06 PROCEDURE — 1159F MED LIST DOCD IN RCRD: CPT | Mod: CPTII,S$GLB,, | Performed by: ORTHOPAEDIC SURGERY

## 2024-08-06 PROCEDURE — 3008F BODY MASS INDEX DOCD: CPT | Mod: CPTII,S$GLB,, | Performed by: ORTHOPAEDIC SURGERY

## 2024-08-06 RX ORDER — ATORVASTATIN CALCIUM 20 MG/1
1 TABLET, FILM COATED ORAL DAILY
COMMUNITY
Start: 2024-07-31 | End: 2025-07-31

## 2024-08-06 NOTE — ANESTHESIA PAT ROS NOTE
08/06/2024  Jerel Serra is a 58 y.o., male.      Pre-op Assessment          Review of Systems           Anesthesia Assessment: Preoperative EQUATION    Planned Procedure: Procedure(s) (LRB):  ARTHROPLASTY, HIP, TOTAL, ANTERIOR APPROACH: RIGHT: DEPUY - ACTIS + EMPHASYS (Right)  Requested Anesthesia Type:Spinal/Epidural  Surgeon: Roly Adames III, MD  Service: Orthopedics  Known or anticipated Date of Surgery:9/3/2024    Surgeon notes: reviewed    Electronic QUestionnaire Assessment completed via nurse interview with patient.        Triage considerations:     The patient has no apparent active cardiac condition (No unstable coronary Syndrome such as severe unstable angina or recent [<1 month] myocardial infarction, decompensated CHF, severe valvular   disease or significant arrhythmia)    Previous anesthesia records:Not available    Last PCP note: within 1 month , outside [facility]   Subspecialty notes: Hepatology, Ortho    Other important co-morbidities: DM2, HLD, HTN, Obesity, and Hep B, Fatty Liver, Liver Fibrosis, Gout, LTHA 2019       Tests already available:  Available tests,  [facility] , [facility] .   6/19/24 Hep B, Hepatic Function Panel, CBC  4/25/24 A1C  2/23/24 CMP   2/23/24 Abd US  8/24/20 EKG             Instructions given. (See in Nurse's note)    Optimization:  Anesthesia Preop Clinic Assessment  Indicated POC    Medical Opinion Indicated Hepatology        Sub-specialist consult indicated:   [facility] Pre Op [facility] NP         Plan:    Testing:  A1C, CMP, EKG, and PT/INR   Pre-anesthesia  visit       Visit focus: possible regional anesthesia and/or nerve block      Consultation:Pre Op Center NP for medical and anesthesia optimization       Patient  has previously scheduled Medical Appointment: 8/14    Navigation: Tests Scheduled.              Consults scheduled.             Results  will be tracked by Preop Clinic.                7/10/24 Hepatology ([facility]):   Need for hip surgery  No contraindication from liver standpoint   no chronic/active Hep B    8/14/24 Patient is optimized     Cr 1.6  BUN 25  GFR 49.6     Recheck Renal panel 8/28/24     I spent a total of 41 minutes on the day of the visit.This includes face to face time and non-face to face time preparing to see the patient (eg, review of tests), obtaining and/or reviewing separately obtained history, documenting clinical information in the electronic or other health record, independently interpreting results and communicating results to the patient/family/caregiver, or care coordinator.    Stefan Green NP  Perioperative Medicine  [facility]   Pager [phone]

## 2024-08-07 ENCOUNTER — TELEPHONE (OUTPATIENT)
Dept: PREADMISSION TESTING | Facility: HOSPITAL | Age: 59
End: 2024-08-07
Payer: COMMERCIAL

## 2024-08-07 ENCOUNTER — TELEPHONE (OUTPATIENT)
Dept: ORTHOPEDICS | Facility: CLINIC | Age: 59
End: 2024-08-07
Payer: COMMERCIAL

## 2024-08-08 DIAGNOSIS — Z96.642 HISTORY OF LEFT HIP REPLACEMENT: Primary | ICD-10-CM

## 2024-08-13 PROBLEM — N52.9 MALE ERECTILE DISORDER: Status: ACTIVE | Noted: 2024-01-18

## 2024-08-13 NOTE — ASSESSMENT & PLAN NOTE
Current /66  today.    Taking: Lotrel- Norvasc HCTZ  Patient reports home BP readings of:130//76      Lifestyle changes to reduce systolic BP: exercise 30 minutes per day,  5 days per week or 150 minutes weekly; sodium reduction and avoidance of high salt foods such as processed meats, frozen meals and  fast foods.   Keeping a healthy weight/BMI can help with better BP control    BP acceptable for surgery. I recommend monitoring BP during perioperative period as uncontrolled pain can elevate blood pressure.

## 2024-08-13 NOTE — ASSESSMENT & PLAN NOTE
DM x  2 years.   Currently takes:   metFORMIN 1000 MG BID tablet  Most recent A1c is 6.9   Average daily accuchecks are 101-123   Reports peripheral neuropathy.  Denies visual changes.    Maintain healthy weight. Exercise at least 150 minutes weekly. Encouraged diet rich in nutrients such as fruits, vegetables, and whole grains; reduce sugar intake from cakes, candy, and sugared drinks.    Micro - Retinopathy, Nephropathy   Macro- Carotid, Coronary , Peripheral disease

## 2024-08-14 ENCOUNTER — OFFICE VISIT (OUTPATIENT)
Dept: ORTHOPEDICS | Facility: CLINIC | Age: 59
End: 2024-08-14
Payer: COMMERCIAL

## 2024-08-14 ENCOUNTER — HOSPITAL ENCOUNTER (OUTPATIENT)
Dept: CARDIOLOGY | Facility: CLINIC | Age: 59
Discharge: HOME | End: 2024-08-14
Payer: COMMERCIAL

## 2024-08-14 ENCOUNTER — TELEPHONE (OUTPATIENT)
Dept: PREADMISSION TESTING | Facility: HOSPITAL | Age: 59
End: 2024-08-14
Payer: COMMERCIAL

## 2024-08-14 ENCOUNTER — HOSPITAL ENCOUNTER (OUTPATIENT)
Dept: RADIOLOGY | Facility: HOSPITAL | Age: 59
Discharge: HOME | End: 2024-08-14
Attending: NURSE PRACTITIONER
Payer: COMMERCIAL

## 2024-08-14 ENCOUNTER — OFFICE VISIT (OUTPATIENT)
Dept: INTERNAL MEDICINE | Facility: CLINIC | Age: 59
End: 2024-08-14
Payer: COMMERCIAL

## 2024-08-14 VITALS
WEIGHT: 225.75 LBS | DIASTOLIC BLOOD PRESSURE: 66 MMHG | HEIGHT: 71 IN | TEMPERATURE: 98 F | HEART RATE: 84 BPM | BODY MASS INDEX: 31.6 KG/M2 | OXYGEN SATURATION: 99 % | SYSTOLIC BLOOD PRESSURE: 146 MMHG

## 2024-08-14 DIAGNOSIS — M16.11 PRIMARY OSTEOARTHRITIS OF RIGHT HIP: ICD-10-CM

## 2024-08-14 DIAGNOSIS — N52.9 MALE ERECTILE DISORDER: ICD-10-CM

## 2024-08-14 DIAGNOSIS — I10 PRIMARY HYPERTENSION: ICD-10-CM

## 2024-08-14 DIAGNOSIS — Z01.818 PRE-OP TESTING: ICD-10-CM

## 2024-08-14 DIAGNOSIS — N28.9 RENAL INSUFFICIENCY: ICD-10-CM

## 2024-08-14 DIAGNOSIS — K76.0 METABOLIC DYSFUNCTION-ASSOCIATED STEATOTIC LIVER DISEASE (MASLD): ICD-10-CM

## 2024-08-14 DIAGNOSIS — Z96.642 HISTORY OF LEFT HIP REPLACEMENT: ICD-10-CM

## 2024-08-14 DIAGNOSIS — E11.65 TYPE 2 DIABETES MELLITUS WITH HYPERGLYCEMIA, WITHOUT LONG-TERM CURRENT USE OF INSULIN: Primary | ICD-10-CM

## 2024-08-14 DIAGNOSIS — Z96.641 S/P HIP REPLACEMENT, RIGHT: Primary | ICD-10-CM

## 2024-08-14 LAB
OHS QRS DURATION: 76 MS
OHS QTC CALCULATION: 478 MS

## 2024-08-14 PROCEDURE — 4010F ACE/ARB THERAPY RXD/TAKEN: CPT | Mod: CPTII,S$GLB,, | Performed by: NURSE PRACTITIONER

## 2024-08-14 PROCEDURE — 3044F HG A1C LEVEL LT 7.0%: CPT | Mod: CPTII,S$GLB,, | Performed by: NURSE PRACTITIONER

## 2024-08-14 PROCEDURE — 3077F SYST BP >= 140 MM HG: CPT | Mod: CPTII,S$GLB,, | Performed by: NURSE PRACTITIONER

## 2024-08-14 PROCEDURE — 93005 ELECTROCARDIOGRAM TRACING: CPT | Mod: S$GLB,,, | Performed by: ANESTHESIOLOGY

## 2024-08-14 PROCEDURE — 72170 X-RAY EXAM OF PELVIS: CPT | Mod: TC

## 2024-08-14 PROCEDURE — 1159F MED LIST DOCD IN RCRD: CPT | Mod: CPTII,S$GLB,, | Performed by: NURSE PRACTITIONER

## 2024-08-14 PROCEDURE — 99214 OFFICE O/P EST MOD 30 MIN: CPT | Mod: S$GLB,,, | Performed by: NURSE PRACTITIONER

## 2024-08-14 PROCEDURE — 99999 PR PBB SHADOW E&M-EST. PATIENT-LVL III: CPT | Mod: PBBFAC,,, | Performed by: NURSE PRACTITIONER

## 2024-08-14 PROCEDURE — 1160F RVW MEDS BY RX/DR IN RCRD: CPT | Mod: CPTII,S$GLB,, | Performed by: NURSE PRACTITIONER

## 2024-08-14 PROCEDURE — 3078F DIAST BP <80 MM HG: CPT | Mod: CPTII,S$GLB,, | Performed by: NURSE PRACTITIONER

## 2024-08-14 PROCEDURE — 3008F BODY MASS INDEX DOCD: CPT | Mod: CPTII,S$GLB,, | Performed by: NURSE PRACTITIONER

## 2024-08-14 PROCEDURE — 72170 X-RAY EXAM OF PELVIS: CPT | Mod: 26,,, | Performed by: RADIOLOGY

## 2024-08-14 PROCEDURE — 93010 ELECTROCARDIOGRAM REPORT: CPT | Mod: S$GLB,,, | Performed by: STUDENT IN AN ORGANIZED HEALTH CARE EDUCATION/TRAINING PROGRAM

## 2024-08-14 PROCEDURE — 99215 OFFICE O/P EST HI 40 MIN: CPT | Mod: S$GLB,,, | Performed by: NURSE PRACTITIONER

## 2024-08-14 RX ORDER — ACETAMINOPHEN 500 MG
1000 TABLET ORAL EVERY 6 HOURS
OUTPATIENT
Start: 2024-08-14

## 2024-08-14 RX ORDER — MORPHINE SULFATE 2 MG/ML
2 INJECTION, SOLUTION INTRAMUSCULAR; INTRAVENOUS
OUTPATIENT
Start: 2024-08-14

## 2024-08-14 RX ORDER — POLYETHYLENE GLYCOL 3350 17 G/17G
17 POWDER, FOR SOLUTION ORAL DAILY
OUTPATIENT
Start: 2024-08-14

## 2024-08-14 RX ORDER — OXYCODONE HYDROCHLORIDE 5 MG/1
5 TABLET ORAL
OUTPATIENT
Start: 2024-08-14

## 2024-08-14 RX ORDER — OXYCODONE HYDROCHLORIDE 5 MG/1
10 TABLET ORAL
OUTPATIENT
Start: 2024-08-14

## 2024-08-14 RX ORDER — MIDAZOLAM HYDROCHLORIDE 1 MG/ML
4 INJECTION, SOLUTION INTRAMUSCULAR; INTRAVENOUS
OUTPATIENT
Start: 2024-08-14 | End: 2024-08-15

## 2024-08-14 RX ORDER — FENTANYL CITRATE 50 UG/ML
25 INJECTION, SOLUTION INTRAMUSCULAR; INTRAVENOUS EVERY 5 MIN PRN
OUTPATIENT
Start: 2024-08-14

## 2024-08-14 RX ORDER — PROCHLORPERAZINE EDISYLATE 5 MG/ML
5 INJECTION INTRAMUSCULAR; INTRAVENOUS EVERY 6 HOURS PRN
OUTPATIENT
Start: 2024-08-14

## 2024-08-14 RX ORDER — CEFAZOLIN SODIUM 2 G/50ML
2 SOLUTION INTRAVENOUS
OUTPATIENT
Start: 2024-08-14

## 2024-08-14 RX ORDER — PREGABALIN 75 MG/1
75 CAPSULE ORAL
OUTPATIENT
Start: 2024-08-14

## 2024-08-14 RX ORDER — SODIUM CHLORIDE 9 MG/ML
INJECTION, SOLUTION INTRAVENOUS CONTINUOUS
OUTPATIENT
Start: 2024-08-14 | End: 2024-08-15

## 2024-08-14 RX ORDER — ACETAMINOPHEN 500 MG
1000 TABLET ORAL
OUTPATIENT
Start: 2024-08-14

## 2024-08-14 RX ORDER — PREGABALIN 75 MG/1
75 CAPSULE ORAL NIGHTLY
OUTPATIENT
Start: 2024-08-14

## 2024-08-14 RX ORDER — POLYETHYLENE GLYCOL 3350 17 G/17G
17 POWDER, FOR SOLUTION ORAL DAILY PRN
OUTPATIENT
Start: 2024-08-14

## 2024-08-14 RX ORDER — MUPIROCIN 20 MG/G
1 OINTMENT TOPICAL
OUTPATIENT
Start: 2024-08-14

## 2024-08-14 RX ORDER — ASPIRIN 81 MG/1
81 TABLET ORAL 2 TIMES DAILY
OUTPATIENT
Start: 2024-08-14

## 2024-08-14 RX ORDER — FENTANYL CITRATE 50 UG/ML
100 INJECTION, SOLUTION INTRAMUSCULAR; INTRAVENOUS
OUTPATIENT
Start: 2024-08-14 | End: 2024-08-15

## 2024-08-14 RX ORDER — CEFAZOLIN SODIUM 2 G/50ML
2 SOLUTION INTRAVENOUS
OUTPATIENT
Start: 2024-08-14 | End: 2024-08-15

## 2024-08-14 RX ORDER — FAMOTIDINE 20 MG/1
20 TABLET, FILM COATED ORAL 2 TIMES DAILY
OUTPATIENT
Start: 2024-08-14

## 2024-08-14 RX ORDER — METHOCARBAMOL 750 MG/1
750 TABLET, FILM COATED ORAL 3 TIMES DAILY
OUTPATIENT
Start: 2024-08-14

## 2024-08-14 RX ORDER — ONDANSETRON HYDROCHLORIDE 2 MG/ML
4 INJECTION, SOLUTION INTRAVENOUS EVERY 8 HOURS PRN
OUTPATIENT
Start: 2024-08-14

## 2024-08-14 RX ORDER — SODIUM CHLORIDE 9 MG/ML
INJECTION, SOLUTION INTRAVENOUS
OUTPATIENT
Start: 2024-08-14

## 2024-08-14 RX ORDER — MUPIROCIN 20 MG/G
1 OINTMENT TOPICAL 2 TIMES DAILY
OUTPATIENT
Start: 2024-08-14 | End: 2024-08-19

## 2024-08-14 RX ORDER — NALOXONE HCL 0.4 MG/ML
0.02 VIAL (ML) INJECTION
OUTPATIENT
Start: 2024-08-14

## 2024-08-14 RX ORDER — LIDOCAINE HYDROCHLORIDE 10 MG/ML
1 INJECTION, SOLUTION EPIDURAL; INFILTRATION; INTRACAUDAL; PERINEURAL
OUTPATIENT
Start: 2024-08-14

## 2024-08-14 RX ORDER — AMOXICILLIN 250 MG
1 CAPSULE ORAL 2 TIMES DAILY
OUTPATIENT
Start: 2024-08-14

## 2024-08-14 NOTE — Clinical Note
Mr. Serra renal function has dropped; he has been instructed to drink plenty of water and stay out of the heat and we will recheck renal panel 8/28/24.  Thank you, Stefan

## 2024-08-14 NOTE — ASSESSMENT & PLAN NOTE
Component      Latest Ref Rng 8/14/2024   Albumin      3.5 - 5.2 g/dL 4.2    BILIRUBIN TOTAL      0.1 - 1.0 mg/dL 0.4    ALP      55 - 135 U/L 61    AST      10 - 40 U/L 41 (H)    ALT      10 - 44 U/L 32       Legend:  (H) High    INR 1.2      No evidence of hepatic decompensation  FOllowed by Hepatology

## 2024-08-14 NOTE — PROGRESS NOTES
[facility]  Progress Note    Patient Name: Jerel Serra  MRN: [id number]  Date of Evaluation- 08/14/2024  PCP- Wiley Piedra MD    Future cases for Ponce Serra [[id number]]       Case ID Status Date Time Cristi Procedure Provider Location    [id number] [facility] 9/3/2024  1:00  ARTHROPLASTY, HIP, TOTAL, ANTERIOR APPROACH: RIGHT: DEPUY - ACTIS + EMPHASYS Roly Adames III, MD [[id number]] [facility] OR            HPI:  This is a 58 y.o. male  who presents today for a preoperative evaluation in preparation for right hip replacement    Surgery is indicated for osteoathritis    .   Patient is new to me.    The history has been obtained by speaking with the patient and reviewing the electronic medical record and/or outside health information. Significant health conditions for the perioperative period are discussed below in assessment and plan.     Patient reports current health status to be Good.  Denies any new symptoms before surgery.       Subjective/ Objective:     Chief Complaint: Preoperative evaulation, perioperative medical management, and complication reduction plan.     Functional Capacity:  Able to climb a flight of stairs without CP SOB or Syncope.  Able to meet 4 METs    Anesthesia issues: None    Difficulty mouth opening:N    Steroid use in the last 12 months:  N    Dental Issues:N    Family anesthesia difficulty: None     Family Hx of Thrombosis N    Past Medical History:   Diagnosis Date    Arthritis     Diabetes mellitus     Diabetes mellitus, type 2     Gout     Gout attack     Hepatitis B core antibody positive 03/23/2022    Negative sAg, suggests previous exposure but no chronic/active Hep B. At risk for reactivation with any immunosuppression medication, steroids, chemo, etc.          Hepatitis B surface antigen positive 03/23/2022    Hyperlipidemia     Hypertension     Renal insufficiency 08/14/2024    Rhabdomyolysis 04/06/2013     Past Surgical History:   Procedure Laterality Date    HIP  "SURGERY Left 2020       Review of Systems   Constitutional:  Negative for chills, fatigue, fever and unexpected weight change.   HENT:  Negative for trouble swallowing and voice change.    Eyes:  Negative for photophobia and visual disturbance.        No acute visual changes   Respiratory:  Negative for cough, shortness of breath and wheezing.         STOP bang  Score 3  High risk EDDY     Cardiovascular:  Negative for chest pain, palpitations and leg swelling.   Gastrointestinal:  Negative for abdominal pain, blood in stool, constipation, diarrhea, nausea and vomiting.        No FLD, Hepatitis, Cirrhosis  No BRB or black tarry stool   Genitourinary:  Negative for difficulty urinating, dysuria, frequency, hematuria and urgency.        Nocturia 0   Musculoskeletal:  Positive for arthralgias and gait problem. Negative for myalgias, neck pain and neck stiffness.   Neurological:  Negative for dizziness, tremors, seizures, syncope, weakness, light-headedness, numbness and headaches.   Psychiatric/Behavioral:  Negative for agitation, behavioral problems, confusion, decreased concentration, dysphoric mood, hallucinations, self-injury, sleep disturbance and suicidal ideas. The patient is not nervous/anxious and is not hyperactive.       VITALS  Visit Vitals  BP (!) 146/66 (BP Location: Left arm, Patient Position: Sitting)   Pulse 84   Temp 97.5 °F (36.4 °C) (Oral)   Ht 5' 11" (1.803 m)   Wt 102.4 kg (225 lb 12 oz)   SpO2 99%   BMI 31.49 kg/m²          Physical Exam  Constitutional:       General: He is not in acute distress.     Appearance: He is well-developed. He is not diaphoretic.   HENT:      Head: Normocephalic.      Right Ear: Hearing normal.      Left Ear: Hearing normal.      Nose: Nose normal.      Mouth/Throat:      Lips: Pink.      Mouth: Mucous membranes are moist.   Eyes:      General: Lids are normal.      Conjunctiva/sclera: Conjunctivae normal.      Pupils: Pupils are equal, round, and reactive to light. "   Neck:      Vascular: No carotid bruit.      Trachea: Trachea and phonation normal.   Cardiovascular:      Rate and Rhythm: Normal rate and regular rhythm.      Pulses:           Carotid pulses are 2+ on the right side and 2+ on the left side.       Radial pulses are 2+ on the right side and 2+ on the left side.        Posterior tibial pulses are 2+ on the right side and 2+ on the left side.      Heart sounds: Normal heart sounds. No murmur heard.     No friction rub. No gallop.   Pulmonary:      Effort: Pulmonary effort is normal.      Breath sounds: Normal breath sounds.      Comments: Clear and equal  Anterior and Posterior BBS  Abdominal:      General: Abdomen is protuberant. Bowel sounds are normal. There is no distension.      Palpations: Abdomen is soft.      Tenderness: There is no abdominal tenderness.   Musculoskeletal:         General: No tenderness or deformity. Normal range of motion.      Cervical back: Normal range of motion.      Right lower leg: No edema.      Left lower leg: No edema.   Lymphadenopathy:      Head:      Right side of head: No submental, submandibular, tonsillar, preauricular, posterior auricular or occipital adenopathy.      Left side of head: No submental, submandibular, tonsillar, preauricular, posterior auricular or occipital adenopathy.      Cervical:      Right cervical: No superficial cervical adenopathy.     Left cervical: No superficial cervical adenopathy.   Skin:     General: Skin is warm and dry.      Capillary Refill: Capillary refill takes 2 to 3 seconds.      Coloration: Skin is not pale.      Findings: No erythema or rash.   Neurological:      Mental Status: He is alert and oriented to person, place, and time.      GCS: GCS eye subscore is 4. GCS verbal subscore is 5. GCS motor subscore is 6.      Motor: No abnormal muscle tone.      Coordination: Coordination normal.   Psychiatric:         Attention and Perception: Attention and perception normal.         Mood and  Affect: Mood and affect normal.         Speech: Speech normal.         Behavior: Behavior normal. Behavior is cooperative.         Thought Content: Thought content normal.         Cognition and Memory: Cognition and memory normal.         Judgment: Judgment normal.          Significant Labs:  Lab Results   Component Value Date    WBC 6.28 06/19/2024    HGB 15.2 06/19/2024    HCT 45.3 06/19/2024     06/19/2024    ALT 32 08/14/2024    AST 41 (H) 08/14/2024     08/14/2024    K 4.7 08/14/2024     08/14/2024    CREATININE 1.6 (H) 08/14/2024    BUN 25 (H) 08/14/2024    CO2 25 08/14/2024    TSH 1.249 02/19/2022    INR 1.2 08/14/2024    HGBA1C 6.9 (H) 08/14/2024       Diagnostic Studies: No relevant studies.    EKG:   Results for orders placed or performed during the hospital encounter of 08/14/24   EKG 12-lead    Collection Time: 08/14/24  9:05 AM   Result Value Ref Range    QRS Duration 76 ms    [facility] QTC Calculation 478 ms    Narrative    Test Reason : Z01.818,    Vent. Rate : 087 BPM     Atrial Rate : 087 BPM     P-R Int : 164 ms          QRS Dur : 076 ms      QT Int : 398 ms       P-R-T Axes : 072 068 066 degrees     QTc Int : 478 ms    Normal sinus rhythm  Possible Left atrial enlargement  Borderline Abnormal ECG  When compared with ECG of 24-AUG-2020 05:47,  Possible left atrial enlargement criteria are present  Confirmed by Wiley Del Rio MD ([id number]) on 8/14/2024 9:24:15 AM    Referred By: AIDEN BARON           Confirmed By:Marco A Del Rio MD       2D ECHO:  TTE:  No results found for this or any previous visit.    GILDARDO:  No results found for this or any previous visit.     Imaging     Active Cardiac Conditions: None      Revised Cardiac Risk Index   High -Risk Surgery  Intraperitoneal; Intrathoracic; suprainguinal vascular Yes- + 1 No- 0   History of Ischemic Heart Disease   (Hx of MI/positive exercise test/current chest pain due to ischemia/use of nitrate therapy/EKG with pathological Q  waves) Yes- + 1 No- 0   History of CHF  (Pulmonary edema/bilateral rales or S3 gallop/PND/CXR showing pulmonary vascular redistribution) Yes- + 1 No- 0   History of CVA   (Prior stroke or TIA) Yes- + 1 No- 0   Pre-operative treatment with insulin Yes- + 1 No- 0   Pre-operative creatinine > 2mg/dl Yes- + 1 No- 0   Total:      Risk Status:  Estimated risk of cardiac complications after non-cardiac surgery using the Revised Cardiac Risk Index for Preoperative risk is 3.9 %      ARISCAT (Canet) risk index: Low: 1.6% risk of post-op pulmonary complications.    American Society of Anesthesiologists Physical Status classification (ASA): 3           No further cardiac workup needed prior to surgery.        Preoperative cardiac risk assessment-  The patient does not have any active cardiac conditions . Revised cardiac risk index predictors- ---.Functional capacity is more than 4 Mets. He will be undergoing a Orthopedic procedure that carries a Moderate Risk risk     The estimated risk of the rate of adverse cardiac outcomes  3.9    No further cardiac work up is indicated prior to proceeding with the surgery     Orders Placed This Encounter    RENAL FUNCTION PANEL       American Society of Anesthesiologists Physical status classification ( ASA ) class: 3     Postoperative pulmonary complication risk assessment: 1.6     ARISCAT ( Canet) risk index- risk class -  Low, if duration of surgery is under 3 hours, intermediate, if duration of surgery is over 3 hours        Assessment/Plan:     Type 2 diabetes mellitus with hyperglycemia  DM x  2 years.   Currently takes:   metFORMIN 1000 MG BID tablet  Most recent A1c is 6.9   Average daily accuchecks are 101-123   Reports peripheral neuropathy.  Denies visual changes.    Maintain healthy weight. Exercise at least 150 minutes weekly. Encouraged diet rich in nutrients such as fruits, vegetables, and whole grains; reduce sugar intake from cakes, candy, and sugared drinks.    Micro -  Retinopathy, Nephropathy   Macro- Carotid, Coronary , Peripheral disease     Male erectile disorder  Viagra    Primary hypertension  Current /66  today.    Taking: Lotrel- Norvasc HCTZ  Patient reports home BP readings of:130//76      Lifestyle changes to reduce systolic BP: exercise 30 minutes per day,  5 days per week or 150 minutes weekly; sodium reduction and avoidance of high salt foods such as processed meats, frozen meals and  fast foods.   Keeping a healthy weight/BMI can help with better BP control    BP acceptable for surgery. I recommend monitoring BP during perioperative period as uncontrolled pain can elevate blood pressure.         Chronic gout  This patient has a history of Gout   Please consider continuing the current maintenance medication for gout and keep the patient adequately hydrated.  Dehydration and surgical stress  can precipitate an acute gout flare-up      Renal insufficiency  Cr 1.6  BUN 25  GFR 49.6    Recheck Renal panel 8/28/24    Deleterious effects NSAID's , Beneficial effects of Hydration discussed   Tylenol as needed for pain   I suggest monitoring renal function, in put and out put status loraine-operatively. I  suggest avoiding nephrotoxic medication including NSAIDs, COX2 inhibitors, intravenous contrast agent,avoiding hypotension to prevent further renal impairment.     Metabolic dysfunction-associated steatotic liver disease (MASLD)  Component      Latest Ref Rng 8/14/2024   Albumin      3.5 - 5.2 g/dL 4.2    BILIRUBIN TOTAL      0.1 - 1.0 mg/dL 0.4    ALP      55 - 135 U/L 61    AST      10 - 40 U/L 41 (H)    ALT      10 - 44 U/L 32       Legend:  (H) High    INR 1.2      No evidence of hepatic decompensation  FOllowed by Hepatology        Preventive perioperative care    Thromboembolic prophylaxis:  His risk factors for thrombosis include obesity, surgical procedure, age, and reduced mobility.I suggest  thromboembolic prophylaxis (  mechanical/pharmacological, weighing the risk benefits of pharmacological agent use considering loraine procedural bleeding )  during the perioperative period.I suggested being active in the post operative period.      Postoperative pulmonary complication prophylaxis-Risk factors for post operative pulmonary complications include ASA class >2- I suggest incentive spirometry use, early ambulation, and pain control so as to avoid diaphragmatic splinting  Brush teeth twice per day, oral rinses, sleep with the head of the bed up 30 degrees     Renal complication prophylaxis-Risk factors for renal complications include diabetes mellitus and hypertension . I suggest keeping him well hydrated and avoidance/ minimizing the use of  NSAID's,ZABALA 2 Inhibitors ,IV contrast if possible in the perioperative period.I suggested drinking 2 litre's of water a day      Surgical site Infection Prophylaxis-I  suggest appropriate antibiotic for Prophylaxis against Surgical site infections Shower with Hibiclens in the night before surgery and the morning of surgery     In view of Spine procedure the patient  is at risk of postoperative urinary retention.  I suggest avoidance / minimizing the of  Benzodiazepines,Anticholinergic medication,antihistamines ( Benadryl) , if possible in the perioperative period. I suggest using the minimum possible use of opioids for the minimum period of time in the perioperative period. Benadryl avoidance suggested      This visit was focused on Preoperative evaluation, Perioperative Medical management, complication reduction plans. I suggest that the patient follows up with primary care or relevant sub specialists for ongoing health care.    I appreciate the opportunity to be involved in this patients care. Please feel free to contact me if there were any questions about this consultation.    Patient is optimized    Cr 1.6  BUN 25  GFR 49.6    Recheck Renal panel 8/28/24    I spent a total of 41 minutes on the  day of the visit.This includes face to face time and non-face to face time preparing to see the patient (eg, review of tests), obtaining and/or reviewing separately obtained history, documenting clinical information in the electronic or other health record, independently interpreting results and communicating results to the patient/family/caregiver, or care coordinator.      Stefan Green NP  Perioperative Medicine  [facility]   Pager [phone]

## 2024-08-14 NOTE — PROGRESS NOTES
Jerel Serra is a 58 y.o. year old here today for pre surgery optimization visit  in preparation for a Right total hip arthroplasty to be performed by Dr. Adames  on 9/3/24.  he was last seen and treated in the clinic on 8/6/2024. he will be medically optimized by the pre op center. There has been no significant change in medical status since last visit. No fever, chills, malaise, or unexplained weight change.      Allergies, Medications, past medical and surgical history reviewed.    Focused examination performed.    Patient declined to see surgeon today. All questions answered. Patient encouraged to call with questions. Contact information given.     Pre, loraine, and post operative procedures and expectations discussed. Goals of successful surgery reviewed and include manageable pain levels, surgical site free of infection, medication management, and ambulation with PT/OT assistance. Healthy weight management discussed with patient and caregiver who were receptive to eduction of healthy diet and activity. No other necessary lifestyle changes identified. Educated patient about signs and symptoms of infection, medication management, anticoagulation therapy, risk of tobacco and alcohol use, and self-care to promote healing. Surgical guide given for future reference. Hibiclens given to patient with instructions. All questions were answered.     Jerel Serra verbalized an understanding to the education and goals. Patient has displayed readiness to engage in care and is ready to proceed with surgery.  Patient reports his fiancee is able and ready to provide assistance at home after discharge.    Surgical and blood consents signed.    DME will be arranged. The mobility limitation cannot be sufficiently resolved by the use of a cane. Patient's functional mobility deficit can be sufficiently resolved with the use of a (Rolling Walker or Walker). Patient's mobility limitation significantly impairs their ability to  "participate in one of more activities of daily living. The use of a (Rolling Walker or Walker) will significantly improve the patient's ability to participate in MRADLS and the patient will use it on regular basis in the home."     Jerel Serra will contact us if there are any questions, concerns, or changes in medical status prior to surgery.       Joint class:     Patient has discussed discharge planning with surgeon. Patient will be discharged to home following surgery.   patient will be scheduled with Home Health during hospitalization.      30 minutes of time was spent on patient education, review of records, templating, H&P, , appointment scheduling and optimizing patient for surgery.      "

## 2024-08-14 NOTE — OUTPATIENT SUBJECTIVE & OBJECTIVE
Outpatient Subjective & Objective      Chief Complaint: Preoperative evaulation, perioperative medical management, and complication reduction plan.     Functional Capacity:  Able to climb a flight of stairs without CP SOB or Syncope.  Able to meet 4 METs    Anesthesia issues: None    Difficulty mouth opening:N    Steroid use in the last 12 months:  N    Dental Issues:N    Family anesthesia difficulty: None     Family Hx of Thrombosis N    Past Medical History:   Diagnosis Date    Arthritis     Diabetes mellitus     Diabetes mellitus, type 2     Gout     Gout attack     Hepatitis B core antibody positive 03/23/2022    Negative sAg, suggests previous exposure but no chronic/active Hep B. At risk for reactivation with any immunosuppression medication, steroids, chemo, etc.          Hepatitis B surface antigen positive 03/23/2022    Hyperlipidemia     Hypertension     Renal insufficiency 08/14/2024    Rhabdomyolysis 04/06/2013     Past Surgical History:   Procedure Laterality Date    HIP SURGERY Left 2020       Review of Systems   Constitutional:  Negative for chills, fatigue, fever and unexpected weight change.   HENT:  Negative for trouble swallowing and voice change.    Eyes:  Negative for photophobia and visual disturbance.        No acute visual changes   Respiratory:  Negative for cough, shortness of breath and wheezing.         STOP bang  Score 3  High risk EDDY     Cardiovascular:  Negative for chest pain, palpitations and leg swelling.   Gastrointestinal:  Negative for abdominal pain, blood in stool, constipation, diarrhea, nausea and vomiting.        No FLD, Hepatitis, Cirrhosis  No BRB or black tarry stool   Genitourinary:  Negative for difficulty urinating, dysuria, frequency, hematuria and urgency.        Nocturia 0   Musculoskeletal:  Positive for arthralgias and gait problem. Negative for myalgias, neck pain and neck stiffness.   Neurological:  Negative for dizziness, tremors, seizures, syncope, weakness,  "light-headedness, numbness and headaches.   Psychiatric/Behavioral:  Negative for agitation, behavioral problems, confusion, decreased concentration, dysphoric mood, hallucinations, self-injury, sleep disturbance and suicidal ideas. The patient is not nervous/anxious and is not hyperactive.       VITALS  Visit Vitals  BP (!) 146/66 (BP Location: Left arm, Patient Position: Sitting)   Pulse 84   Temp 97.5 °F (36.4 °C) (Oral)   Ht 5' 11" (1.803 m)   Wt 102.4 kg (225 lb 12 oz)   SpO2 99%   BMI 31.49 kg/m²          Physical Exam  Constitutional:       General: He is not in acute distress.     Appearance: He is well-developed. He is not diaphoretic.   HENT:      Head: Normocephalic.      Right Ear: Hearing normal.      Left Ear: Hearing normal.      Nose: Nose normal.      Mouth/Throat:      Lips: Pink.      Mouth: Mucous membranes are moist.   Eyes:      General: Lids are normal.      Conjunctiva/sclera: Conjunctivae normal.      Pupils: Pupils are equal, round, and reactive to light.   Neck:      Vascular: No carotid bruit.      Trachea: Trachea and phonation normal.   Cardiovascular:      Rate and Rhythm: Normal rate and regular rhythm.      Pulses:           Carotid pulses are 2+ on the right side and 2+ on the left side.       Radial pulses are 2+ on the right side and 2+ on the left side.        Posterior tibial pulses are 2+ on the right side and 2+ on the left side.      Heart sounds: Normal heart sounds. No murmur heard.     No friction rub. No gallop.   Pulmonary:      Effort: Pulmonary effort is normal.      Breath sounds: Normal breath sounds.      Comments: Clear and equal  Anterior and Posterior BBS  Abdominal:      General: Abdomen is protuberant. Bowel sounds are normal. There is no distension.      Palpations: Abdomen is soft.      Tenderness: There is no abdominal tenderness.   Musculoskeletal:         General: No tenderness or deformity. Normal range of motion.      Cervical back: Normal range of " motion.      Right lower leg: No edema.      Left lower leg: No edema.   Lymphadenopathy:      Head:      Right side of head: No submental, submandibular, tonsillar, preauricular, posterior auricular or occipital adenopathy.      Left side of head: No submental, submandibular, tonsillar, preauricular, posterior auricular or occipital adenopathy.      Cervical:      Right cervical: No superficial cervical adenopathy.     Left cervical: No superficial cervical adenopathy.   Skin:     General: Skin is warm and dry.      Capillary Refill: Capillary refill takes 2 to 3 seconds.      Coloration: Skin is not pale.      Findings: No erythema or rash.   Neurological:      Mental Status: He is alert and oriented to person, place, and time.      GCS: GCS eye subscore is 4. GCS verbal subscore is 5. GCS motor subscore is 6.      Motor: No abnormal muscle tone.      Coordination: Coordination normal.   Psychiatric:         Attention and Perception: Attention and perception normal.         Mood and Affect: Mood and affect normal.         Speech: Speech normal.         Behavior: Behavior normal. Behavior is cooperative.         Thought Content: Thought content normal.         Cognition and Memory: Cognition and memory normal.         Judgment: Judgment normal.          Significant Labs:  Lab Results   Component Value Date    WBC 6.28 06/19/2024    HGB 15.2 06/19/2024    HCT 45.3 06/19/2024     06/19/2024    ALT 32 08/14/2024    AST 41 (H) 08/14/2024     08/14/2024    K 4.7 08/14/2024     08/14/2024    CREATININE 1.6 (H) 08/14/2024    BUN 25 (H) 08/14/2024    CO2 25 08/14/2024    TSH 1.249 02/19/2022    INR 1.2 08/14/2024    HGBA1C 6.9 (H) 08/14/2024       Diagnostic Studies: No relevant studies.    EKG:   Results for orders placed or performed during the hospital encounter of 08/14/24   EKG 12-lead    Collection Time: 08/14/24  9:05 AM   Result Value Ref Range    QRS Duration 76 ms    OHS QTC Calculation 478 ms     Narrative    Test Reason : Z01.818,    Vent. Rate : 087 BPM     Atrial Rate : 087 BPM     P-R Int : 164 ms          QRS Dur : 076 ms      QT Int : 398 ms       P-R-T Axes : 072 068 066 degrees     QTc Int : 478 ms    Normal sinus rhythm  Possible Left atrial enlargement  Borderline Abnormal ECG  When compared with ECG of 24-AUG-2020 05:47,  Possible left atrial enlargement criteria are present  Confirmed by Wiley Del Rio MD ([id number]) on 8/14/2024 9:24:15 AM    Referred By: AIDEN BARON           Confirmed By:Marco A Del Rio MD       2D ECHO:  TTE:  No results found for this or any previous visit.    GILDARDO:  No results found for this or any previous visit.     Imaging     Active Cardiac Conditions: None      Revised Cardiac Risk Index   High -Risk Surgery  Intraperitoneal; Intrathoracic; suprainguinal vascular Yes- + 1 No- 0   History of Ischemic Heart Disease   (Hx of MI/positive exercise test/current chest pain due to ischemia/use of nitrate therapy/EKG with pathological Q waves) Yes- + 1 No- 0   History of CHF  (Pulmonary edema/bilateral rales or S3 gallop/PND/CXR showing pulmonary vascular redistribution) Yes- + 1 No- 0   History of CVA   (Prior stroke or TIA) Yes- + 1 No- 0   Pre-operative treatment with insulin Yes- + 1 No- 0   Pre-operative creatinine > 2mg/dl Yes- + 1 No- 0   Total:      Risk Status:  Estimated risk of cardiac complications after non-cardiac surgery using the Revised Cardiac Risk Index for Preoperative risk is 3.9 %      ARISCAT (Canet) risk index: Low: 1.6% risk of post-op pulmonary complications.    American Society of Anesthesiologists Physical Status classification (ASA): 3           No further cardiac workup needed prior to surgery.    Outpatient Subjective & Objective

## 2024-08-14 NOTE — ASSESSMENT & PLAN NOTE
Cr 1.6  BUN 25  GFR 49.6    Recheck Renal panel 8/28/24    Deleterious effects NSAID's , Beneficial effects of Hydration discussed   Tylenol as needed for pain   I suggest monitoring renal function, in put and out put status loraine-operatively. I  suggest avoiding nephrotoxic medication including NSAIDs, COX2 inhibitors, intravenous contrast agent,avoiding hypotension to prevent further renal impairment.

## 2024-08-14 NOTE — H&P (VIEW-ONLY)
CC:  Right hip pain    Jerel Serra is a 58 y.o. male with Right hip pain.  Pain is worse with activity and weight bearing.  Patient has experienced interference of activities of daily living due to increased pain and decreased range of motion. Patient has failed non-operative treatment including NSAIDs, as well as greater than 3 months of activity modification. Jerel Serra ambulates independently.     Relevant medical conditions of significance in perioperative period:  DM- on medication managed by pcp  HTN- on medication managed by pcp  HLD- on medication managed by pcp  Gout- on medication managed by pcp      Past Medical History:   Diagnosis Date    Arthritis     Diabetes mellitus     Diabetes mellitus, type 2     Gout     Gout attack     Hepatitis B core antibody positive 03/23/2022    Negative sAg, suggests previous exposure but no chronic/active Hep B. At risk for reactivation with any immunosuppression medication, steroids, chemo, etc.          Hepatitis B surface antigen positive 03/23/2022    Hyperlipidemia     Hypertension        Past Surgical History:   Procedure Laterality Date    HIP SURGERY Left 2020       Family History   Problem Relation Name Age of Onset    COPD Mother      Hypertension Mother      Ovarian cancer Mother      Hypertension Father      Diabetes Father         Review of patient's allergies indicates:   Allergen Reactions    Aspirin          Current Outpatient Medications:     allopurinol (ZYLOPRIM) 300 MG tablet, Take 300 mg by mouth once daily., Disp: , Rfl:     amlodipine-benazepril 5-20 mg (LOTREL) 5-20 mg per capsule, , Disp: , Rfl:     atorvastatin (LIPITOR) 20 MG tablet, Take 1 tablet by mouth once daily., Disp: , Rfl:     metFORMIN (GLUCOPHAGE) 500 MG tablet, 1,000 mg 2 (two) times a day., Disp: , Rfl:     multivitamin with minerals tablet, Take 1 tablet by mouth once daily., Disp: , Rfl:     sildenafiL (VIAGRA) 50 MG tablet, Take 50 mg by mouth. (Patient not taking:  Reported on 8/14/2024), Disp: , Rfl:     Review of Systems:   Constitutional: no fever or chills  Eyes: no visual changes  ENT: no nasal congestion or sore throat  Respiratory: no cough or shortness of breath  Cardiovascular: no chest pain or palpitations  Gastrointestinal: no nausea or vomiting, tolerating diet  Genitourinary: no hematuria or dysuria  Integument/Breast: no rash or pruritis  Hematologic/Lymphatic: no easy bruising or lymphadenopathy  Musculoskeletal: positive for hip pain  Neurological: no seizures or tremors  Behavioral/Psych: no auditory or visual hallucinations  Endocrine: no heat or cold intolerance    PE:  There were no vitals taken for this visit.  General: Pleasant, cooperative, NAD   Gait: antalgic  HEENT: NCAT, sclera nonicteric   Lungs: Respirations are clear, equal and unlabored.   CV: S1S2; 2+ bilateral upper and lower extremity pulses.   Skin: Intact throughout LE with no rashes, erythema, or lesions  Extremities: No LE edema, NVI lower extremities    Right Hip Exam:  80 degrees flexion  0 degrees extension   0 degrees internal rotation  15 degrees external rotation  15 degrees abduction  15 degrees adduction  There is pain with passive range of motion.     Radiographs: Radiographs reveal advanced degenerative changes including subchondral cyst formation, subchondral sclerosis, osteophyte formation, joint space narrowing.     Diagnosis: Primary osteoarthritis Right hip    Plan: Right total hip arthroplasty     Due to the serious nature of total joint infection and the high prevalence of community acquired MRSA, vancomycin will be used perioperatively.

## 2024-08-14 NOTE — PROGRESS NOTES
[facility]    HOME HEALTH ORDERS  FACE TO FACE ENCOUNTER    Patient Name: Jerel Serra  YOB: 1965    PCP: Wiley Piedra MD   PCP Address: [address] [place] [address]  PCP Phone Number: [phone]  PCP Fax: [phone]    Encounter Date: 08/14/2024    Admit to Home Health    Diagnoses:  There are no hospital problems to display for this patient.      Future Appointments   Date Time Provider Department Center   8/14/2024 10:45 AM EKG, APPT [facility] EKG [facility]   9/6/2024  1:00 PM Elisabet Chavez NP [facility]   9/17/2024 10:20 AM Elisabet Chavez NP [facility]   10/2/2024 11:45 AM NOM HEPATOLOGY, FIBROSCAN [facility] HEPAT [facility]   10/2/2024  1:00 PM Huyen Gant NP [facility] HEPAT [facility]   10/15/2024 11:00 AM Roly Adames III, MD [facility] ORTHO [facility]   11/26/2024 11:00 AM Roly Adames III, MD [facility] ORTHO [facility]           I have seen and examined this patient face to face today. My clinical findings that support the need for the home health skilled services and home bound status are the following:  Weakness/numbness causing balance and gait disturbance due to Joint Replacement making it taxing to leave home.  Requiring assistive device to leave home due to unsteady gait caused by Joint Replacement.  Medical restrictions requiring assistance of another human to leave home due to  Unstable ambulation and Decreased range of motions in extremities.    Allergies:  Review of patient's allergies indicates:   Allergen Reactions    Aspirin        Diet: regular diet    Activities: activity as tolerated    Home Health Admitting Clinician:   SN/PT to complete comprehensive assessment including routine vital signs. Instruct on disease process and s/s of complications to report to MD. Follow specific home health arthoplasty protocol. Review/verify medication list sent home with the patient at time of discharge  and  instruct patient/caregiver as needed. If coumadin ordered, coumadin clinic to manage INR with INR draws 2x per week with a goal to maintain INR between 1.8 and 2.2. Frequency may be adjusted depending on start of care date.    Notify MD if SBP > 160 or < 90; DBP > 90 or < 50; HR > 120 or < 50; Temp > 101    Home Medical Equipment:  Walker, 3-1 bedside commode, transfer tub bench    CONSULTS:    Physical Therapy may admit if patient not on coumadin, PT to perform comprehensive assessment if performing admit visit and generate therapy plan of care. Evaluate for home safety and equipment needs; Establish/upgrade home exercise program. Perform/instruct on therapeutic exercises, gait training, transfer training, and Range of Motion.      OTHER: (only select if patient needs other therapy disciplines)  Occupational Therapy to evaluate and treat. Evaluate home environment for safety and equipment needs. Perform/Instruct on transfers, ADL training, ROM, and therapeutic exercises.    MISCELLANEOUS CARE:  Dr. Adames approves of home health in the post op period.    WOUND CARE ORDERS:  Assess Surgical Incision/DSRG each TX  Aquacel AG drsg applied post-op leave on 14 days post op. Call MD if any drainage reaches border to border of drsg horizontally, s/s of infection, temp >101, induration, swelling or redness.  If dressing is removed per MD order, then apply island dressing, change/teach caregiver to perform daily dressing change if island dressing present.    Medications: Review discharge medications with patient and family and provide education.      Cannot display discharge medications since this is not an admission.      I certify that this patient is confined to his home and needs physical therapy and occupational therapy.

## 2024-08-14 NOTE — HPI
This is a 58 y.o. male  who presents today for a preoperative evaluation in preparation for right hip replacement    Surgery is indicated for osteoathritis    .   Patient is new to me.    The history has been obtained by speaking with the patient and reviewing the electronic medical record and/or outside health information. Significant health conditions for the perioperative period are discussed below in assessment and plan.     Patient reports current health status to be Good.  Denies any new symptoms before surgery.

## 2024-08-14 NOTE — DISCHARGE INSTRUCTIONS
Your surgery has been scheduled for:_______9/3/24___________________________________    You should report to:  ____[facility], located on the [place] side of the first floor of the           [facility] ([phone])  ____[facility], located on the [facility] side of the            Second floor of the [facility] ([phone])  ____3rd Floor [facility] located on the [facility] side of the [facility] [phone]  __X__[facility]: located at [address] [facility] [place] [address]. [address].     Please Note   Tell your doctor if you take Aspirin, products containing Aspirin, herbal medications  or blood thinners, such as Coumadin, Ticlid, or Plavix.  (Consult your provider regarding holding or stopping before surgery).  Arrange for someone to drive you home following surgery.  You will not be allowed to leave the surgical facility alone or drive yourself home following sedation and anesthesia.    Before Surgery  Stop taking all herbal medications, vitamins, and supplements 7 days prior to surgery  No Motrin/Advil (Ibuprofen) 7 days before surgery  No Aleve (Naproxen) 7 days before surgery   No Goody's/BC Powder 7 days before surgery  Refrain from drinking alcoholic beverages for 24 hours before and after surgery  Stop or limit smoking at least 24 hours prior to surgery  You may take Tylenol for pain    Night before Surgery  Do not eat or drink after midnight  Take a shower or bath (shower is recommended).  Bathe with Hibiclens soap or an antibacterial soap from the neck down.  If not supplied by your surgeon, hibiclens soap will need to be purchased over the counter in pharmacy.  Rinse soap off thoroughly.  Shampoo your hair with your regular shampoo    The Day of Surgery  Take another bath or shower with hibiclens or any antibacterial soap, to reduce the chance of infection.  Take heart and  blood pressure medications with a small sip of water, as advised by the perioperative team.  Do not take fluid pills  You may brush your teeth and rinse your mouth, but do not swallow any additional water.   Do not apply perfumes, powder, body lotions or deodorant on the day of surgery.  Nail polish should be removed.  Do not wear makeup or moisturizer  Wear comfortable clothes, such as a button front shirt and loose fitting pants.  Leave all jewelry, including body piercings, and valuables at home.    Bring any devices you will need after surgery such as crutches or canes.  If you have sleep apnea, please bring your CPAP machine  In the event that your physical condition changes including the onset of a cold or respiratory illness, or if you have to delay or cancel your surgery, please notify your surgeon.

## 2024-08-15 ENCOUNTER — NURSE TRIAGE (OUTPATIENT)
Dept: ADMINISTRATIVE | Facility: CLINIC | Age: 59
End: 2024-08-15
Payer: COMMERCIAL

## 2024-08-15 NOTE — TELEPHONE ENCOUNTER
Pt wanting to know how much water to drink before surgery.     Advised pt that I am sending an urgent message to Orthopedic MD and Surgeon. Pt vu.     Reason for Disposition   Non-urgent call redirected to specialist's office because it is open    Protocols used: No Contact or Duplicate Contact Call-A-OH

## 2024-08-16 ENCOUNTER — PATIENT MESSAGE (OUTPATIENT)
Dept: ORTHOPEDICS | Facility: CLINIC | Age: 59
End: 2024-08-16
Payer: COMMERCIAL

## 2024-08-28 ENCOUNTER — LAB VISIT (OUTPATIENT)
Dept: LAB | Facility: HOSPITAL | Age: 59
End: 2024-08-28
Payer: COMMERCIAL

## 2024-08-28 ENCOUNTER — TELEPHONE (OUTPATIENT)
Dept: ORTHOPEDICS | Facility: CLINIC | Age: 59
End: 2024-08-28
Payer: COMMERCIAL

## 2024-08-28 DIAGNOSIS — N28.9 RENAL INSUFFICIENCY: ICD-10-CM

## 2024-08-28 LAB
ALBUMIN SERPL BCP-MCNC: 3.9 G/DL (ref 3.5–5.2)
ANION GAP SERPL CALC-SCNC: 9 MMOL/L (ref 8–16)
BUN SERPL-MCNC: 24 MG/DL (ref 6–20)
CALCIUM SERPL-MCNC: 9.6 MG/DL (ref 8.7–10.5)
CHLORIDE SERPL-SCNC: 104 MMOL/L (ref 95–110)
CO2 SERPL-SCNC: 26 MMOL/L (ref 23–29)
CREAT SERPL-MCNC: 1.2 MG/DL (ref 0.5–1.4)
EST. GFR  (NO RACE VARIABLE): >60 ML/MIN/1.73 M^2
GLUCOSE SERPL-MCNC: 186 MG/DL (ref 70–110)
PHOSPHATE SERPL-MCNC: 3.3 MG/DL (ref 2.7–4.5)
POTASSIUM SERPL-SCNC: 4.3 MMOL/L (ref 3.5–5.1)
SODIUM SERPL-SCNC: 139 MMOL/L (ref 136–145)

## 2024-08-28 PROCEDURE — 80069 RENAL FUNCTION PANEL: CPT | Performed by: NURSE PRACTITIONER

## 2024-08-28 PROCEDURE — 36415 COLL VENOUS BLD VENIPUNCTURE: CPT | Performed by: NURSE PRACTITIONER

## 2024-08-28 NOTE — TELEPHONE ENCOUNTER
Spoke to and informed pt that we will give him a call either Thursday or Friday with the arrival time for his surgery on September 3, 2024. Also informed pt that if he does not answer the phone by any chance we will leave a voicemail and also a message on his portal lettign him know the information.     Sincerely,  Rachel Fox, [facility]   Certified Clinical Medical Assistant to Dr. Roly andrade  Phone: [phone]  Fax: [phone]      ----- Message from Kevin Gardner sent at 8/28/2024 12:16 PM CDT -----  Pt Requesting Call Back    Who called: pt  Who called for pt:  Best call back #: [phone]  Add notes: pt wants to know what time his 9/3/24 surgery is for

## 2024-08-29 DIAGNOSIS — Z96.641 S/P HIP REPLACEMENT, RIGHT: Primary | ICD-10-CM

## 2024-08-30 ENCOUNTER — TELEPHONE (OUTPATIENT)
Dept: ORTHOPEDICS | Facility: CLINIC | Age: 59
End: 2024-08-30
Payer: COMMERCIAL

## 2024-08-30 DIAGNOSIS — Z96.641 S/P HIP REPLACEMENT, RIGHT: Primary | ICD-10-CM

## 2024-08-30 RX ORDER — AMOXICILLIN 250 MG
1 CAPSULE ORAL DAILY
Qty: 30 TABLET | Refills: 0 | Status: SHIPPED | OUTPATIENT
Start: 2024-08-30

## 2024-08-30 RX ORDER — CELECOXIB 200 MG/1
200 CAPSULE ORAL DAILY
Qty: 30 CAPSULE | Refills: 0 | Status: SHIPPED | OUTPATIENT
Start: 2024-08-30

## 2024-08-30 RX ORDER — ASPIRIN 81 MG/1
81 TABLET ORAL 2 TIMES DAILY
Qty: 60 TABLET | Refills: 0 | Status: SHIPPED | OUTPATIENT
Start: 2024-08-30 | End: 2024-09-29

## 2024-08-30 RX ORDER — METHOCARBAMOL 750 MG/1
750 TABLET, FILM COATED ORAL 4 TIMES DAILY PRN
Qty: 40 TABLET | Refills: 0 | Status: SHIPPED | OUTPATIENT
Start: 2024-08-30 | End: 2024-09-09

## 2024-08-30 RX ORDER — CEFADROXIL 500 MG/1
500 CAPSULE ORAL EVERY 12 HOURS
Qty: 14 CAPSULE | Refills: 0 | Status: SHIPPED | OUTPATIENT
Start: 2024-08-30

## 2024-08-30 RX ORDER — DEXTROMETHORPHAN HYDROBROMIDE, GUAIFENESIN 5; 100 MG/5ML; MG/5ML
650 LIQUID ORAL EVERY 8 HOURS
Qty: 120 TABLET | Refills: 0 | Status: SHIPPED | OUTPATIENT
Start: 2024-08-30

## 2024-08-30 RX ORDER — OXYCODONE HYDROCHLORIDE 5 MG/1
TABLET ORAL
Qty: 28 TABLET | Refills: 0 | Status: SHIPPED | OUTPATIENT
Start: 2024-08-30

## 2024-08-30 RX ORDER — PANTOPRAZOLE SODIUM 40 MG/1
40 TABLET, DELAYED RELEASE ORAL DAILY
Qty: 30 TABLET | Refills: 0 | Status: SHIPPED | OUTPATIENT
Start: 2024-08-30 | End: 2024-09-29

## 2024-08-30 NOTE — TELEPHONE ENCOUNTER
Called patient to clarify aspirin allergy. He reports that he's been taking it all of his life without difficulty. His mom told him that he was allergic, but he doesn't think he is. Allergy deleted.

## 2024-08-30 NOTE — TELEPHONE ENCOUNTER
I called the patient today regarding surgery on 9/3/2024 with Dr. Roly Adames. I informed the patient that his surgery will be at  [facility] [address] ([facility] [place] [address]). I informed the patient they must arrive at 5:00am and their surgery will start at 7:00am.     Per the [facility] COVID-19 Pre-Procedural Testing Policy (administered 10/26/2022), patients do not need tested for COVID-19 regardless of vaccination status.    I reminded the patient that they cannot eat or drink after midnight, the night before surgery.      I reminded the patient to be careful of their skin over the next few days to make sure they do not get any cuts, scratches or scrapes.    The patient has not yet received their walker, bedside commode or shower chair from [facility]. I told the patient that she needs to call [facility] today at [phone].    The patient verbalized understanding and has no further questions.

## 2024-09-03 ENCOUNTER — ANESTHESIA (OUTPATIENT)
Dept: SURGERY | Facility: HOSPITAL | Age: 59
End: 2024-09-03
Payer: COMMERCIAL

## 2024-09-03 ENCOUNTER — ANESTHESIA EVENT (OUTPATIENT)
Dept: SURGERY | Facility: HOSPITAL | Age: 59
End: 2024-09-03
Payer: COMMERCIAL

## 2024-09-03 ENCOUNTER — HOSPITAL ENCOUNTER (OUTPATIENT)
Facility: HOSPITAL | Age: 59
Discharge: HOME | End: 2024-09-04
Attending: ORTHOPAEDIC SURGERY | Admitting: ORTHOPAEDIC SURGERY
Payer: COMMERCIAL

## 2024-09-03 DIAGNOSIS — M16.11 PRIMARY OSTEOARTHRITIS OF RIGHT HIP: ICD-10-CM

## 2024-09-03 LAB
POCT GLUCOSE: 161 MG/DL (ref 70–110)
POCT GLUCOSE: 186 MG/DL (ref 70–110)
POCT GLUCOSE: 241 MG/DL (ref 70–110)
POCT GLUCOSE: 259 MG/DL (ref 70–110)

## 2024-09-03 PROCEDURE — 25000003 PHARM REV CODE 250: Performed by: PHYSICIAN ASSISTANT

## 2024-09-03 PROCEDURE — 25000003 PHARM REV CODE 250: Performed by: ORTHOPAEDIC SURGERY

## 2024-09-03 PROCEDURE — 27130 TOTAL HIP ARTHROPLASTY: CPT | Mod: RT,,, | Performed by: ORTHOPAEDIC SURGERY

## 2024-09-03 PROCEDURE — 25000003 PHARM REV CODE 250: Performed by: STUDENT IN AN ORGANIZED HEALTH CARE EDUCATION/TRAINING PROGRAM

## 2024-09-03 PROCEDURE — 97535 SELF CARE MNGMENT TRAINING: CPT

## 2024-09-03 PROCEDURE — 63600175 PHARM REV CODE 636 W HCPCS: Performed by: NURSE PRACTITIONER

## 2024-09-03 PROCEDURE — 63600175 PHARM REV CODE 636 W HCPCS: Performed by: ANESTHESIOLOGY

## 2024-09-03 PROCEDURE — 25000003 PHARM REV CODE 250: Performed by: NURSE ANESTHETIST, CERTIFIED REGISTERED

## 2024-09-03 PROCEDURE — 27100025 HC TUBING, SET FLUID WARMER: Performed by: NURSE ANESTHETIST, CERTIFIED REGISTERED

## 2024-09-03 PROCEDURE — 71000033 HC RECOVERY, INTIAL HOUR: Performed by: ORTHOPAEDIC SURGERY

## 2024-09-03 PROCEDURE — 97165 OT EVAL LOW COMPLEX 30 MIN: CPT

## 2024-09-03 PROCEDURE — 27201423 OPTIME MED/SURG SUP & DEVICES STERILE SUPPLY: Performed by: ORTHOPAEDIC SURGERY

## 2024-09-03 PROCEDURE — 37000009 HC ANESTHESIA EA ADD 15 MINS: Performed by: ORTHOPAEDIC SURGERY

## 2024-09-03 PROCEDURE — 25000003 PHARM REV CODE 250: Performed by: ANESTHESIOLOGY

## 2024-09-03 PROCEDURE — 99900035 HC TECH TIME PER 15 MIN (STAT)

## 2024-09-03 PROCEDURE — 82962 GLUCOSE BLOOD TEST: CPT | Performed by: ORTHOPAEDIC SURGERY

## 2024-09-03 PROCEDURE — 25000003 PHARM REV CODE 250: Performed by: NURSE PRACTITIONER

## 2024-09-03 PROCEDURE — C1776 JOINT DEVICE (IMPLANTABLE): HCPCS | Performed by: ORTHOPAEDIC SURGERY

## 2024-09-03 PROCEDURE — 71000039 HC RECOVERY, EACH ADD'L HOUR: Performed by: ORTHOPAEDIC SURGERY

## 2024-09-03 PROCEDURE — 63600175 PHARM REV CODE 636 W HCPCS: Performed by: ORTHOPAEDIC SURGERY

## 2024-09-03 PROCEDURE — 97530 THERAPEUTIC ACTIVITIES: CPT

## 2024-09-03 PROCEDURE — 97116 GAIT TRAINING THERAPY: CPT

## 2024-09-03 PROCEDURE — C1751 CATH, INF, PER/CENT/MIDLINE: HCPCS | Performed by: ANESTHESIOLOGY

## 2024-09-03 PROCEDURE — 63600175 PHARM REV CODE 636 W HCPCS: Performed by: NURSE ANESTHETIST, CERTIFIED REGISTERED

## 2024-09-03 PROCEDURE — 37000008 HC ANESTHESIA 1ST 15 MINUTES: Performed by: ORTHOPAEDIC SURGERY

## 2024-09-03 PROCEDURE — 94761 N-INVAS EAR/PLS OXIMETRY MLT: CPT

## 2024-09-03 PROCEDURE — C1713 ANCHOR/SCREW BN/BN,TIS/BN: HCPCS | Performed by: ORTHOPAEDIC SURGERY

## 2024-09-03 PROCEDURE — 36000710: Performed by: ORTHOPAEDIC SURGERY

## 2024-09-03 PROCEDURE — 97162 PT EVAL MOD COMPLEX 30 MIN: CPT

## 2024-09-03 PROCEDURE — 36000711: Performed by: ORTHOPAEDIC SURGERY

## 2024-09-03 DEVICE — EMPHASYS ACETABULAR SHELL THREE-HOLE 54MM CEMENTLESS
Type: IMPLANTABLE DEVICE | Site: HIP | Status: FUNCTIONAL
Brand: EMPHASYS

## 2024-09-03 DEVICE — BIOLOX DELTA CERAMIC FEMORAL HEAD +1.5 36MM DIA 12/14 TAPER
Type: IMPLANTABLE DEVICE | Site: HIP | Status: FUNCTIONAL
Brand: BIOLOX DELTA

## 2024-09-03 DEVICE — PINNACLE CANCELLOUS BONE SCREW 6.5MM X 25MM
Type: IMPLANTABLE DEVICE | Site: HIP | Status: FUNCTIONAL
Brand: PINNACLE

## 2024-09-03 DEVICE — ACTIS DUOFIX HIP PROSTHESIS (FEMORAL STEM 12/14 TAPER CEMENTLESS SIZE 6 HIGH COLLAR)  CE
Type: IMPLANTABLE DEVICE | Site: HIP | Status: FUNCTIONAL
Brand: ACTIS

## 2024-09-03 DEVICE — PINNACLE CANCELLOUS BONE SCREW 6.5MM X 20MM
Type: IMPLANTABLE DEVICE | Site: HIP | Status: FUNCTIONAL
Brand: PINNACLE

## 2024-09-03 RX ORDER — DEXAMETHASONE SODIUM PHOSPHATE 4 MG/ML
INJECTION, SOLUTION INTRA-ARTICULAR; INTRALESIONAL; INTRAMUSCULAR; INTRAVENOUS; SOFT TISSUE
Status: DISCONTINUED | OUTPATIENT
Start: 2024-09-03 | End: 2024-09-03

## 2024-09-03 RX ORDER — GLUCAGON 1 MG
1 KIT INJECTION
Status: DISCONTINUED | OUTPATIENT
Start: 2024-09-03 | End: 2024-09-04 | Stop reason: HOSPADM

## 2024-09-03 RX ORDER — ASPIRIN 81 MG/1
81 TABLET ORAL 2 TIMES DAILY
Status: DISCONTINUED | OUTPATIENT
Start: 2024-09-03 | End: 2024-09-04 | Stop reason: HOSPADM

## 2024-09-03 RX ORDER — KETAMINE HCL IN 0.9 % NACL 50 MG/5 ML
SYRINGE (ML) INTRAVENOUS
Status: DISCONTINUED | OUTPATIENT
Start: 2024-09-03 | End: 2024-09-03

## 2024-09-03 RX ORDER — OXYCODONE HYDROCHLORIDE 5 MG/1
5 TABLET ORAL
Status: DISCONTINUED | OUTPATIENT
Start: 2024-09-03 | End: 2024-09-04 | Stop reason: HOSPADM

## 2024-09-03 RX ORDER — SODIUM CHLORIDE 9 MG/ML
INJECTION, SOLUTION INTRAVENOUS CONTINUOUS
Status: ACTIVE | OUTPATIENT
Start: 2024-09-03 | End: 2024-09-04

## 2024-09-03 RX ORDER — MORPHINE SULFATE 4 MG/ML
INJECTION, SOLUTION INTRAMUSCULAR; INTRAVENOUS
Status: DISCONTINUED | OUTPATIENT
Start: 2024-09-03 | End: 2024-09-03 | Stop reason: HOSPADM

## 2024-09-03 RX ORDER — TRANEXAMIC ACID 100 MG/ML
INJECTION, SOLUTION INTRAVENOUS
Status: DISCONTINUED | OUTPATIENT
Start: 2024-09-03 | End: 2024-09-03 | Stop reason: HOSPADM

## 2024-09-03 RX ORDER — INSULIN ASPART 100 [IU]/ML
0-10 INJECTION, SOLUTION INTRAVENOUS; SUBCUTANEOUS
Status: DISCONTINUED | OUTPATIENT
Start: 2024-09-03 | End: 2024-09-04 | Stop reason: HOSPADM

## 2024-09-03 RX ORDER — IBUPROFEN 200 MG
16 TABLET ORAL
Status: DISCONTINUED | OUTPATIENT
Start: 2024-09-03 | End: 2024-09-04 | Stop reason: HOSPADM

## 2024-09-03 RX ORDER — FAMOTIDINE 20 MG/1
20 TABLET, FILM COATED ORAL 2 TIMES DAILY
Status: DISCONTINUED | OUTPATIENT
Start: 2024-09-03 | End: 2024-09-04 | Stop reason: HOSPADM

## 2024-09-03 RX ORDER — MUPIROCIN 20 MG/G
1 OINTMENT TOPICAL 2 TIMES DAILY
Status: DISCONTINUED | OUTPATIENT
Start: 2024-09-03 | End: 2024-09-04 | Stop reason: HOSPADM

## 2024-09-03 RX ORDER — LIDOCAINE HYDROCHLORIDE 10 MG/ML
1 INJECTION, SOLUTION EPIDURAL; INFILTRATION; INTRACAUDAL; PERINEURAL
Status: DISCONTINUED | OUTPATIENT
Start: 2024-09-03 | End: 2024-09-03 | Stop reason: HOSPADM

## 2024-09-03 RX ORDER — PREGABALIN 75 MG/1
75 CAPSULE ORAL
Status: COMPLETED | OUTPATIENT
Start: 2024-09-03 | End: 2024-09-03

## 2024-09-03 RX ORDER — BETHANECHOL CHLORIDE 10 MG/1
10 TABLET ORAL
Status: COMPLETED | OUTPATIENT
Start: 2024-09-03 | End: 2024-09-03

## 2024-09-03 RX ORDER — ONDANSETRON HYDROCHLORIDE 2 MG/ML
4 INJECTION, SOLUTION INTRAVENOUS EVERY 8 HOURS PRN
Status: DISCONTINUED | OUTPATIENT
Start: 2024-09-03 | End: 2024-09-04 | Stop reason: HOSPADM

## 2024-09-03 RX ORDER — GLUCAGON 1 MG
1 KIT INJECTION
Status: DISCONTINUED | OUTPATIENT
Start: 2024-09-03 | End: 2024-09-03 | Stop reason: HOSPADM

## 2024-09-03 RX ORDER — ONDANSETRON HYDROCHLORIDE 2 MG/ML
INJECTION, SOLUTION INTRAVENOUS
Status: DISCONTINUED | OUTPATIENT
Start: 2024-09-03 | End: 2024-09-03

## 2024-09-03 RX ORDER — MIDAZOLAM HYDROCHLORIDE 1 MG/ML
INJECTION INTRAMUSCULAR; INTRAVENOUS
Status: DISCONTINUED | OUTPATIENT
Start: 2024-09-03 | End: 2024-09-03

## 2024-09-03 RX ORDER — FAMOTIDINE 10 MG/ML
INJECTION INTRAVENOUS
Status: DISCONTINUED | OUTPATIENT
Start: 2024-09-03 | End: 2024-09-03

## 2024-09-03 RX ORDER — IBUPROFEN 200 MG
24 TABLET ORAL
Status: DISCONTINUED | OUTPATIENT
Start: 2024-09-03 | End: 2024-09-04 | Stop reason: HOSPADM

## 2024-09-03 RX ORDER — ACETAMINOPHEN 500 MG
1000 TABLET ORAL EVERY 6 HOURS
Status: DISCONTINUED | OUTPATIENT
Start: 2024-09-03 | End: 2024-09-04 | Stop reason: HOSPADM

## 2024-09-03 RX ORDER — AMLODIPINE AND BENAZEPRIL HYDROCHLORIDE 5; 20 MG/1; MG/1
1 CAPSULE ORAL DAILY
Status: DISCONTINUED | OUTPATIENT
Start: 2024-09-03 | End: 2024-09-04 | Stop reason: HOSPADM

## 2024-09-03 RX ORDER — AMLODIPINE AND BENAZEPRIL HYDROCHLORIDE 5; 20 MG/1; MG/1
1 CAPSULE ORAL DAILY
Status: DISCONTINUED | OUTPATIENT
Start: 2024-09-03 | End: 2024-09-03

## 2024-09-03 RX ORDER — MUPIROCIN 20 MG/G
1 OINTMENT TOPICAL
Status: COMPLETED | OUTPATIENT
Start: 2024-09-03 | End: 2024-09-03

## 2024-09-03 RX ORDER — PREGABALIN 75 MG/1
75 CAPSULE ORAL NIGHTLY
Status: DISCONTINUED | OUTPATIENT
Start: 2024-09-03 | End: 2024-09-04 | Stop reason: HOSPADM

## 2024-09-03 RX ORDER — NALOXONE HCL 0.4 MG/ML
0.02 VIAL (ML) INJECTION
Status: DISCONTINUED | OUTPATIENT
Start: 2024-09-03 | End: 2024-09-04 | Stop reason: HOSPADM

## 2024-09-03 RX ORDER — INSULIN ASPART 100 [IU]/ML
0-5 INJECTION, SOLUTION INTRAVENOUS; SUBCUTANEOUS
Status: DISCONTINUED | OUTPATIENT
Start: 2024-09-03 | End: 2024-09-03 | Stop reason: SDUPTHER

## 2024-09-03 RX ORDER — PROPOFOL 10 MG/ML
VIAL (ML) INTRAVENOUS CONTINUOUS PRN
Status: DISCONTINUED | OUTPATIENT
Start: 2024-09-03 | End: 2024-09-03

## 2024-09-03 RX ORDER — SODIUM CHLORIDE 9 MG/ML
INJECTION, SOLUTION INTRAVENOUS
Status: COMPLETED | OUTPATIENT
Start: 2024-09-03 | End: 2024-09-03

## 2024-09-03 RX ORDER — MIDAZOLAM HYDROCHLORIDE 1 MG/ML
4 INJECTION, SOLUTION INTRAMUSCULAR; INTRAVENOUS
Status: DISCONTINUED | OUTPATIENT
Start: 2024-09-03 | End: 2024-09-03 | Stop reason: HOSPADM

## 2024-09-03 RX ORDER — FENTANYL CITRATE 50 UG/ML
100 INJECTION, SOLUTION INTRAMUSCULAR; INTRAVENOUS
Status: DISCONTINUED | OUTPATIENT
Start: 2024-09-03 | End: 2024-09-03 | Stop reason: HOSPADM

## 2024-09-03 RX ORDER — PROCHLORPERAZINE EDISYLATE 5 MG/ML
5 INJECTION INTRAMUSCULAR; INTRAVENOUS EVERY 6 HOURS PRN
Status: DISCONTINUED | OUTPATIENT
Start: 2024-09-03 | End: 2024-09-04 | Stop reason: HOSPADM

## 2024-09-03 RX ORDER — ALLOPURINOL 300 MG/1
300 TABLET ORAL DAILY
Status: DISCONTINUED | OUTPATIENT
Start: 2024-09-04 | End: 2024-09-04 | Stop reason: HOSPADM

## 2024-09-03 RX ORDER — ROPIVACAINE HYDROCHLORIDE 5 MG/ML
INJECTION, SOLUTION EPIDURAL; INFILTRATION; PERINEURAL
Status: DISCONTINUED | OUTPATIENT
Start: 2024-09-03 | End: 2024-09-03 | Stop reason: HOSPADM

## 2024-09-03 RX ORDER — HALOPERIDOL 5 MG/ML
0.5 INJECTION INTRAMUSCULAR EVERY 10 MIN PRN
Status: DISCONTINUED | OUTPATIENT
Start: 2024-09-03 | End: 2024-09-03 | Stop reason: HOSPADM

## 2024-09-03 RX ORDER — VANCOMYCIN HYDROCHLORIDE 1 G/20ML
INJECTION, POWDER, LYOPHILIZED, FOR SOLUTION INTRAVENOUS
Status: DISCONTINUED | OUTPATIENT
Start: 2024-09-03 | End: 2024-09-03 | Stop reason: HOSPADM

## 2024-09-03 RX ORDER — GLUCAGON 1 MG
1 KIT INJECTION
Status: DISCONTINUED | OUTPATIENT
Start: 2024-09-03 | End: 2024-09-03 | Stop reason: SDUPTHER

## 2024-09-03 RX ORDER — OXYCODONE HYDROCHLORIDE 5 MG/1
5 TABLET ORAL
Status: DISCONTINUED | OUTPATIENT
Start: 2024-09-03 | End: 2024-09-03 | Stop reason: HOSPADM

## 2024-09-03 RX ORDER — IBUPROFEN 200 MG
16 TABLET ORAL
Status: DISCONTINUED | OUTPATIENT
Start: 2024-09-03 | End: 2024-09-03 | Stop reason: SDUPTHER

## 2024-09-03 RX ORDER — ONDANSETRON HYDROCHLORIDE 2 MG/ML
4 INJECTION, SOLUTION INTRAVENOUS DAILY PRN
Status: DISCONTINUED | OUTPATIENT
Start: 2024-09-03 | End: 2024-09-03 | Stop reason: HOSPADM

## 2024-09-03 RX ORDER — IBUPROFEN 200 MG
24 TABLET ORAL
Status: DISCONTINUED | OUTPATIENT
Start: 2024-09-03 | End: 2024-09-03 | Stop reason: SDUPTHER

## 2024-09-03 RX ORDER — FENTANYL CITRATE 50 UG/ML
25 INJECTION, SOLUTION INTRAMUSCULAR; INTRAVENOUS EVERY 5 MIN PRN
Status: DISCONTINUED | OUTPATIENT
Start: 2024-09-03 | End: 2024-09-03 | Stop reason: HOSPADM

## 2024-09-03 RX ORDER — METHOCARBAMOL 750 MG/1
750 TABLET, FILM COATED ORAL 3 TIMES DAILY
Status: DISCONTINUED | OUTPATIENT
Start: 2024-09-03 | End: 2024-09-03

## 2024-09-03 RX ORDER — SODIUM CHLORIDE 0.9 % (FLUSH) 0.9 %
10 SYRINGE (ML) INJECTION
Status: DISCONTINUED | OUTPATIENT
Start: 2024-09-03 | End: 2024-09-03 | Stop reason: HOSPADM

## 2024-09-03 RX ORDER — POLYETHYLENE GLYCOL 3350 17 G/17G
17 POWDER, FOR SOLUTION ORAL DAILY PRN
Status: DISCONTINUED | OUTPATIENT
Start: 2024-09-03 | End: 2024-09-04 | Stop reason: HOSPADM

## 2024-09-03 RX ORDER — FENTANYL CITRATE 50 UG/ML
INJECTION, SOLUTION INTRAMUSCULAR; INTRAVENOUS
Status: DISCONTINUED | OUTPATIENT
Start: 2024-09-03 | End: 2024-09-03

## 2024-09-03 RX ORDER — ACETAMINOPHEN 500 MG
1000 TABLET ORAL
Status: COMPLETED | OUTPATIENT
Start: 2024-09-03 | End: 2024-09-03

## 2024-09-03 RX ORDER — ALLOPURINOL 300 MG/1
300 TABLET ORAL DAILY
Status: DISCONTINUED | OUTPATIENT
Start: 2024-09-03 | End: 2024-09-03

## 2024-09-03 RX ORDER — METHYLPREDNISOLONE ACETATE 40 MG/ML
INJECTION, SUSPENSION INTRA-ARTICULAR; INTRALESIONAL; INTRAMUSCULAR; SOFT TISSUE
Status: DISCONTINUED | OUTPATIENT
Start: 2024-09-03 | End: 2024-09-03 | Stop reason: HOSPADM

## 2024-09-03 RX ORDER — AMOXICILLIN 250 MG
1 CAPSULE ORAL 2 TIMES DAILY
Status: DISCONTINUED | OUTPATIENT
Start: 2024-09-03 | End: 2024-09-04 | Stop reason: HOSPADM

## 2024-09-03 RX ORDER — POLYETHYLENE GLYCOL 3350 17 G/17G
17 POWDER, FOR SOLUTION ORAL DAILY
Status: DISCONTINUED | OUTPATIENT
Start: 2024-09-03 | End: 2024-09-04 | Stop reason: HOSPADM

## 2024-09-03 RX ORDER — OXYCODONE HYDROCHLORIDE 10 MG/1
10 TABLET ORAL
Status: DISCONTINUED | OUTPATIENT
Start: 2024-09-03 | End: 2024-09-04 | Stop reason: HOSPADM

## 2024-09-03 RX ORDER — ATORVASTATIN CALCIUM 20 MG/1
20 TABLET, FILM COATED ORAL DAILY
Status: DISCONTINUED | OUTPATIENT
Start: 2024-09-04 | End: 2024-09-04 | Stop reason: HOSPADM

## 2024-09-03 RX ORDER — METHOCARBAMOL 750 MG/1
750 TABLET, FILM COATED ORAL 3 TIMES DAILY
Status: DISCONTINUED | OUTPATIENT
Start: 2024-09-03 | End: 2024-09-04 | Stop reason: HOSPADM

## 2024-09-03 RX ORDER — AMLODIPINE AND BENAZEPRIL HYDROCHLORIDE 5; 20 MG/1; MG/1
1 CAPSULE ORAL DAILY
Status: DISCONTINUED | OUTPATIENT
Start: 2024-09-04 | End: 2024-09-03

## 2024-09-03 RX ORDER — MORPHINE SULFATE 2 MG/ML
2 INJECTION, SOLUTION INTRAMUSCULAR; INTRAVENOUS
Status: DISCONTINUED | OUTPATIENT
Start: 2024-09-03 | End: 2024-09-04 | Stop reason: HOSPADM

## 2024-09-03 RX ADMIN — MUPIROCIN 1 G: 20 OINTMENT TOPICAL at 09:09

## 2024-09-03 RX ADMIN — TRANEXAMIC ACID 2000 MG: 100 INJECTION INTRAVENOUS at 07:09

## 2024-09-03 RX ADMIN — CEFAZOLIN 2 G: 2 INJECTION, POWDER, FOR SOLUTION INTRAMUSCULAR; INTRAVENOUS at 07:09

## 2024-09-03 RX ADMIN — SODIUM CHLORIDE: 0.9 INJECTION, SOLUTION INTRAVENOUS at 06:09

## 2024-09-03 RX ADMIN — Medication 25 MG: at 07:09

## 2024-09-03 RX ADMIN — PREGABALIN 75 MG: 75 CAPSULE ORAL at 06:09

## 2024-09-03 RX ADMIN — ACETAMINOPHEN 1000 MG: 500 TABLET ORAL at 05:09

## 2024-09-03 RX ADMIN — FENTANYL CITRATE 25 MCG: 50 INJECTION, SOLUTION INTRAMUSCULAR; INTRAVENOUS at 07:09

## 2024-09-03 RX ADMIN — METHOCARBAMOL 750 MG: 750 TABLET ORAL at 09:09

## 2024-09-03 RX ADMIN — OXYCODONE 5 MG: 5 TABLET ORAL at 04:09

## 2024-09-03 RX ADMIN — FAMOTIDINE 20 MG: 20 TABLET ORAL at 09:09

## 2024-09-03 RX ADMIN — TRANEXAMIC ACID 1000 MG: 100 INJECTION INTRAVENOUS at 08:09

## 2024-09-03 RX ADMIN — ONDANSETRON 4 MG: 2 INJECTION INTRAMUSCULAR; INTRAVENOUS at 08:09

## 2024-09-03 RX ADMIN — OXYCODONE HYDROCHLORIDE 5 MG: 5 TABLET ORAL at 09:09

## 2024-09-03 RX ADMIN — VANCOMYCIN HYDROCHLORIDE 1500 MG: 1.5 INJECTION, POWDER, LYOPHILIZED, FOR SOLUTION INTRAVENOUS at 06:09

## 2024-09-03 RX ADMIN — ACETAMINOPHEN 1000 MG: 500 TABLET ORAL at 11:09

## 2024-09-03 RX ADMIN — CEFAZOLIN 2 G: 2 INJECTION, POWDER, FOR SOLUTION INTRAMUSCULAR; INTRAVENOUS at 02:09

## 2024-09-03 RX ADMIN — AMLODIPINE BESYLATE AND BENAZEPRIL HYDROCHLORIDE 1 CAPSULE: 5; 20 CAPSULE ORAL at 11:09

## 2024-09-03 RX ADMIN — FENTANYL CITRATE 25 MCG: 50 INJECTION INTRAMUSCULAR; INTRAVENOUS at 09:09

## 2024-09-03 RX ADMIN — PROPOFOL 85 MCG/KG/MIN: 10 INJECTION, EMULSION INTRAVENOUS at 07:09

## 2024-09-03 RX ADMIN — ASPIRIN 81 MG: 81 TABLET, COATED ORAL at 09:09

## 2024-09-03 RX ADMIN — BETHANECHOL CHLORIDE 10 MG: 10 TABLET ORAL at 11:09

## 2024-09-03 RX ADMIN — OXYCODONE 5 MG: 5 TABLET ORAL at 09:09

## 2024-09-03 RX ADMIN — ACETAMINOPHEN 1000 MG: 500 TABLET ORAL at 06:09

## 2024-09-03 RX ADMIN — METHOCARBAMOL 750 MG: 750 TABLET ORAL at 04:09

## 2024-09-03 RX ADMIN — MIDAZOLAM HYDROCHLORIDE 2 MG: 2 INJECTION, SOLUTION INTRAMUSCULAR; INTRAVENOUS at 06:09

## 2024-09-03 RX ADMIN — SODIUM CHLORIDE, SODIUM GLUCONATE, SODIUM ACETATE, POTASSIUM CHLORIDE, MAGNESIUM CHLORIDE, SODIUM PHOSPHATE, DIBASIC, AND POTASSIUM PHOSPHATE: .53; .5; .37; .037; .03; .012; .00082 INJECTION, SOLUTION INTRAVENOUS at 08:09

## 2024-09-03 RX ADMIN — MEPIVACAINE HYDROCHLORIDE 3 ML: 15 INJECTION, SOLUTION EPIDURAL; INFILTRATION at 07:09

## 2024-09-03 RX ADMIN — INSULIN ASPART 4 UNITS: 100 INJECTION, SOLUTION INTRAVENOUS; SUBCUTANEOUS at 05:09

## 2024-09-03 RX ADMIN — BETHANECHOL CHLORIDE 10 MG: 10 TABLET ORAL at 09:09

## 2024-09-03 RX ADMIN — SODIUM CHLORIDE: 9 INJECTION, SOLUTION INTRAVENOUS at 06:09

## 2024-09-03 RX ADMIN — DEXAMETHASONE SODIUM PHOSPHATE 8 MG: 4 INJECTION, SOLUTION INTRAMUSCULAR; INTRAVENOUS at 07:09

## 2024-09-03 RX ADMIN — SODIUM CHLORIDE: 9 INJECTION, SOLUTION INTRAVENOUS at 10:09

## 2024-09-03 RX ADMIN — FENTANYL CITRATE 25 MCG: 50 INJECTION INTRAMUSCULAR; INTRAVENOUS at 10:09

## 2024-09-03 RX ADMIN — PREGABALIN 75 MG: 75 CAPSULE ORAL at 09:09

## 2024-09-03 RX ADMIN — SODIUM CHLORIDE, SODIUM GLUCONATE, SODIUM ACETATE, POTASSIUM CHLORIDE, MAGNESIUM CHLORIDE, SODIUM PHOSPHATE, DIBASIC, AND POTASSIUM PHOSPHATE: .53; .5; .37; .037; .03; .012; .00082 INJECTION, SOLUTION INTRAVENOUS at 07:09

## 2024-09-03 RX ADMIN — BETHANECHOL CHLORIDE 10 MG: 10 TABLET ORAL at 10:09

## 2024-09-03 RX ADMIN — MUPIROCIN 1 G: 20 OINTMENT TOPICAL at 06:09

## 2024-09-03 RX ADMIN — INSULIN ASPART 3 UNITS: 100 INJECTION, SOLUTION INTRAVENOUS; SUBCUTANEOUS at 09:09

## 2024-09-03 RX ADMIN — SODIUM CHLORIDE: 9 INJECTION, SOLUTION INTRAVENOUS at 09:09

## 2024-09-03 RX ADMIN — FAMOTIDINE 20 MG: 10 INJECTION, SOLUTION INTRAVENOUS at 07:09

## 2024-09-03 RX ADMIN — CEFAZOLIN 2 G: 2 INJECTION, POWDER, FOR SOLUTION INTRAMUSCULAR; INTRAVENOUS at 11:09

## 2024-09-03 RX ADMIN — MUPIROCIN 1 G: 20 OINTMENT TOPICAL at 11:09

## 2024-09-03 NOTE — HPI
CC:  Right hip pain     Jerel Serra is a 58 y.o. male with Right hip pain.  Pain is worse with activity and weight bearing.  Patient has experienced interference of activities of daily living due to increased pain and decreased range of motion. Patient has failed non-operative treatment including NSAIDs, as well as greater than 3 months of activity modification. Jerel Serra ambulates independently.      Relevant medical conditions of significance in perioperative period:  DM- on medication managed by pcp  HTN- on medication managed by pcp  HLD- on medication managed by pcp  Gout- on medication managed by pcp

## 2024-09-03 NOTE — DISCHARGE INSTRUCTIONS
Do not remove surgical dressing for 2 weeks post-op. This will be done only by MD at initial post-op visit.    Patient may shower at home. Dry area around surgical dressing well afterward. No submerging underwater (bathing, swimming, hot tub) for 1 month.     If dressing becomes saturated with drainage or there are signs of infection, please call [facility] [phone] for further instructions and to make appt to be seen.

## 2024-09-03 NOTE — PT/OT/SLP EVAL
"Physical Therapy Evaluation and Treatment    Patient Name: Jerel Serra   MRN: [id number]  Recent Surgery: Procedure(s) (LRB):  ARTHROPLASTY, HIP, TOTAL, ANTERIOR APPROACH: RIGHT: DEPUY - ACTIS + EMPHASYS (Right) Day of Surgery    Recommendations:     Discharge Recommendations: Low Intensity Therapy   Discharge Equipment Recommendations: bedside commode, walker, rolling, shower chair   Barriers to discharge:  3 flights of stairs to his apt- B rails.    Assessment:     Jerel Serra is a 58 y.o. male admitted with a medical diagnosis of Primary osteoarthritis of right hip. He presents with the following impairments/functional limitations: weakness, impaired endurance, impaired self care skills, impaired functional mobility, gait instability, impaired balance, decreased lower extremity function, pain, decreased ROM, edema, orthopedic precautions. Pt presents s/p R TEOFILO with expected post-op deficits.  Pt did really well with PT today and was able to amb 140 feet with RW and CGA .   He had no LOB and no dizziness, amb with decrease bolivar. He verbalized good understanding of ant hip precautions.  Pt will benefit from cont PT to maximize  functional recovery, strength and ROM.      Rehab Prognosis: Good; patient would benefit from acute PT services to address these deficits and reach maximum level of function.    Plan:     During this hospitalization, patient to be seen daily to address the above listed problems via therapeutic activities, gait training, therapeutic exercises    Plan of Care Expires: 10/03/24    Subjective     Chief Complaint: Pt reports he is feeling " really good".  Pt states " it hurst but not like the pain I had before surgery".  Patient Comments/Goals: to be able to dance and more at his wedding in December  Pain/Comfort:  Pain Rating 1: 5/10  Location - Side 1: Right  Location 1: hip  Pain Addressed 1: Pre-medicate for activity, Reposition, Distraction  Pain Rating Post-Intervention 1: " 5/10    Social History:  Living Environment: Patient lives with their fiancee  in a third floor apartment with number of outside stair(s): 3 flights with B rails and no elevator access  Prior Level of Function: Prior to admission, patient was independent. + working , + driving and climbing 3 flights of stairs daliy  Equipment Used at Home: none  DME owned (not currently used): none  Assistance Upon Discharge:  his fiancee, but will be home alone at times when she is at work    Objective:     Communicated with RN, Chantale and Anahi,  prior to session. Patient found up in chair with peripheral IV, FCD upon PT entry to room.    General Precautions: Standard, fall   Orthopedic Precautions: RLE weight bearing as tolerated, RLE anterior precautions (WBAT with walker, no extremes of motion, hip flexion 0-90 degrees)    Braces: N/A    Respiratory Status: Room air    Exams:  RLE ROM: WFL except for ant hip precautions  RLE Strength:  at least 3/5  LLE ROM: WFL  LLE Strength: WNL  Cognitive: Patient is oriented to Person, Place, Time, Situation  Gross Motor Coordination: WFL  Postural Exam: Patient presented with the following abnormalities:    -       Rounded shoulders  -       Forward head  Sensation:    -       Intact    Functional Mobility:  Gait belt applied - Yes  Bed Mobility  Seated in chair at start of session and returned to chair  Transfers  Sit to Stand: contact guard assistance with rolling walker and with cues for hand placement and foot placement  Gait  Patient ambulated 140 feet with rolling walker and contact guard assistance. Patient required cues for heel strike, position in walker, sequencing, upright posture, and weight bearing status to increase independence and safety. Patient required cues ~ 25% of the time. He amb with decrease bolivar and step length and required increase time for task.  Balance  Sitting: GOOD: Maintains balance through MODERATE excursions of active trunk movement  Standing: FAIR:  Needs CONTACT GUARD during gait    Therapeutic Activities and Exercises:  Patient educated on role of acute care PT and PT POC, safety while in hospital including calling nurse for mobility, and call light usage.  Educated about weightbearing as alfredo and provided cuing for adherence as appropriate during session.  Educated about importance of OOB mobility and remaining up in chair most of the day.  PT reviewed ant hip precautions with pt and applications to mobility and he demonstrated good understanding back to PT.    Pt ed on mobility expectation at home and he verbalized good understanding back to PT.     AM-PAC 6 CLICK MOBILITY  Total Score:18    Patient left up in chair with all lines intact, call button in reach, and RN notified.    GOALS:   Multidisciplinary Problems       Physical Therapy Goals          Problem: Physical Therapy    Goal Priority Disciplines Outcome Goal Variances Interventions   Physical Therapy Goal     PT, PT/OT Progressing     Description: Goals to be met by: 2024     Patient will increase functional independence with mobility by performin. Supine to sit with Supervision  2. Sit to stand transfer with Supervision  3. Gait  x 200 feet with Supervision using Rolling Walker.   4. Ascend/descend 12 stairs with bilateral Handrails Stand-by Assistance using No Assistive Device.   5. Lower extremity exercise program x 30 reps per handout, with supervision    Patient demonstrates a mobility limitation that significantly impairs their ability to participate in one or more mobility related activities of daily living. Patient's mobility limitation cannot be sufficiently resolved with the use of a cane, but can be sufficiently resolved with the use of a rolling walker.The use of a rolling walker will considerably improve their ability to participate in MRADLs. Patient will use the walker on a regular basis at home.                           History:     Past Medical History:   Diagnosis  Date    Arthritis     Diabetes mellitus     Diabetes mellitus, type 2     Gout     Gout attack     Hepatitis B core antibody positive 03/23/2022    Negative sAg, suggests previous exposure but no chronic/active Hep B. At risk for reactivation with any immunosuppression medication, steroids, chemo, etc.          Hepatitis B surface antigen positive 03/23/2022    Hyperlipidemia     Hypertension     Renal insufficiency 08/14/2024    Rhabdomyolysis 04/06/2013       Past Surgical History:   Procedure Laterality Date    HIP SURGERY Left 2020       Time Tracking:     PT Received On: 09/03/24  PT Start Time: 1152  PT Stop Time: 1222  PT Total Time (min): 30 min     Billable Minutes: Evaluation 7, Gait Training 14, and Therapeutic Activity 9    9/3/2024

## 2024-09-03 NOTE — PLAN OF CARE
Problem: Physical Therapy  Goal: Physical Therapy Goal  Description: Goals to be met by: 2024     Patient will increase functional independence with mobility by performin. Supine to sit with Supervision  2. Sit to stand transfer with Supervision  3. Gait  x 200 feet with Supervision using Rolling Walker.   4. Ascend/descend 12 stairs with bilateral Handrails Stand-by Assistance using No Assistive Device.   5. Lower extremity exercise program x 30 reps per handout, with supervision    Patient demonstrates a mobility limitation that significantly impairs their ability to participate in one or more mobility related activities of daily living. Patient's mobility limitation cannot be sufficiently resolved with the use of a cane, but can be sufficiently resolved with the use of a rolling walker.The use of a rolling walker will considerably improve their ability to participate in MRADLs. Patient will use the walker on a regular basis at home.      Outcome: Progressing

## 2024-09-03 NOTE — PT/OT/SLP EVAL
"Occupational Therapy Evaluation and Treatment    Name: Jerel Serra  MRN: [id number]  Admitting Diagnosis: Primary osteoarthritis of right hip Day of Surgery  Recent Surgery: Procedure(s) (LRB):  ARTHROPLASTY, HIP, TOTAL, ANTERIOR APPROACH: RIGHT: DEPUY - ACTIS + EMPHASYS (Right) Day of Surgery    Recommendations:     Discharge Recommendations: Low Intensity Therapy  Level of Assistance Recommended: 24 hours supervision  Discharge Equipment Recommendations: bedside commode, shower chair, walker, rolling  Barriers to discharge: None    Assessment:     Jerel Serra is a 58 y.o. male with a medical diagnosis of Primary osteoarthritis of right hip. He presents with performance deficits affecting function including impaired self care skills, impaired functional mobility, orthopedic precautions. Pt was able to perform sit/stand, stand pivot to BSC over toilet and was able to walk to bathroom c CGA and RW.  Able to perform toilet hygiene and grooming c CGA and RW.  Educated pt on hip precautions.  Pt is progressing well.    Rehab Prognosis: Good; patient would benefit from acute OT services to address these deficits and reach maximum level of function.    Plan:     Patient to be seen daily to address the above listed problems via self-care/home management, therapeutic activities, therapeutic exercises  Plan of Care Expires: 09/04/24  Plan of Care Reviewed with: patient    Subjective     Chief Complaint: R TEOFILO, ant hip- 0-90* of hip flexion and no extremes of motion  Patient Comments/Goals: "I feel much better."  Pain/Comfort:  Pain Rating 1: 5/10    Patients cultural, spiritual, Sabianist conflicts given the current situation: no    Social History:  Living Environment: Patient lives with their significant other in a third floor apartment with number of outside stair(s): 2 flights of 8 steps apiece and tub-shower combo  Prior Level of Function: Prior to admission, patient was independent.  Roles and Routines: Patient was " driving and working prior to admission.  Equipment Used at Home: none  DME owned (not currently used): none  Assistance Upon Discharge: significant other    Objective:     Communicated with RN prior to session. Patient found up in chair with FCD, peripheral IV, cryotherapy upon OT entry to room.    General Precautions: Standard, fall   Orthopedic Precautions: RLE weight bearing as tolerated, RLE anterior precautions (0-90* of hip flexion and no extremes of motion)   Braces: N/A    Respiratory Status: Room air    Occupational Performance    Gait belt applied - Yes    Functional Mobility/Transfers:  Sit <> Stand Transfer with contact guard assistance with rolling walker  Toilet Transfer Stand Pivot technique with contact guard assistance with rolling walker and bedside commode  Functional Mobility: Pt was able to walk to bathroom c CGA and RW.    Activities of Daily Living:  Grooming: contact guard assistance to wash hands while standing at sink c RW.  Toileting: contact guard assistance for toilet    Physical Exam:  Upper Extremity Range of Motion:     -       Right Upper Extremity: WFL  -       Left Upper Extremity: WFL  Upper Extremity Strength:    -       Right Upper Extremity: WFL  -       Left Upper Extremity: WFL    AMPAC 6 Click ADL:  AMPAC Total Score: 16    Treatment & Education:  Educated on the importance of mobility to maximize recovery  Educated on the importance of OOB mobility within safe range in order to decrease adverse effects of prolonged bedrest  Educated on Anterior hip precautions - patient recalled 3 hip precautions  Educated on use of hip kit during LB dressing  Educated on safety with functional mobility; hand placement to ensure safe transfers to various surfaces in prep for ADLs  Educated on performing functional mobility and ADLs in adherence to orthopedic precautions  Educated on weight bearing status  Will continue to educate as needed      Patient clear to ambulate in hallways with  RN/PCT.    Patient left up in chair with all lines intact, call button in reach, and RN notified.    GOALS:   Multidisciplinary Problems       Occupational Therapy Goals          Problem: Occupational Therapy    Goal Priority Disciplines Outcome Interventions   Occupational Therapy Goal     OT, PT/OT Progressing    Description: Goals to be met by: 9/4/24     Patient will increase functional independence with ADLs by performing:    UE Dressing with Modified [place].  LE Dressing with Modified [place] and Assistive Devices as needed.  Grooming while standing at sink with Modified [place].  Toileting from bedside commode with Modified [place] for hygiene and clothing management.   Bathing from  shower chair/bench with Modified [place].  Toilet transfer to bedside commode with Modified [place].  Pt will state 3/3 hip precautions correctly.                         History:     Past Medical History:   Diagnosis Date    Arthritis     Diabetes mellitus     Diabetes mellitus, type 2     Gout     Gout attack     Hepatitis B core antibody positive 03/23/2022    Negative sAg, suggests previous exposure but no chronic/active Hep B. At risk for reactivation with any immunosuppression medication, steroids, chemo, etc.          Hepatitis B surface antigen positive 03/23/2022    Hyperlipidemia     Hypertension     Renal insufficiency 08/14/2024    Rhabdomyolysis 04/06/2013         Past Surgical History:   Procedure Laterality Date    HIP SURGERY Left 2020       Time Tracking:     OT Date of Treatment: 09/03/24  OT Start Time: 1342  OT Stop Time: 1416  OT Total Time (min): 34 min    Billable Minutes: Evaluation 10, Self Care/Home Management 12, and Therapeutic Activity 12    9/3/2024

## 2024-09-03 NOTE — HOSPITAL COURSE
On 9/3/2024, the patient arrived to the [facility] for proper pre-operative management.  Upon completion of pre-operative preparation, the patient was taken back to the operative theatre. R TEOFILO was performed without complication and the patient was transported to the post anesthesia care unit in stable condition.  After appropriate recovery from the anaesthetic agents used during the surgery, the patient was then transported to the hospital inpatient floor.  The interim of the hospital stay from arrival on the floor up to discharge has been uncomplicated. The patient has tolerated regular diet.  The patient's pain has been controlled using a multimodal approach. Currently, the patient's pain is well controlled on an oral regimen.  The patient has been voiding without difficulty.  The patient began participation in physical therapy after surgery and has progressed throughout the entire hospital stay.  Currently, the patient's progress is sufficient to allow the them to be discharged to home safely.  The patient agrees with this assessment and desires a discharge today.

## 2024-09-03 NOTE — PLAN OF CARE
Problem: Occupational Therapy  Goal: Occupational Therapy Goal  Description: Goals to be met by: 9/4/24     Patient will increase functional independence with ADLs by performing:    UE Dressing with Modified [place].  LE Dressing with Modified [place] and Assistive Devices as needed.  Grooming while standing at sink with Modified [place].  Toileting from bedside commode with Modified [place] for hygiene and clothing management.   Bathing from  shower chair/bench with Modified [place].  Toilet transfer to bedside commode with Modified [place].  Pt will state 3/3 hip precautions correctly.    Outcome: Progressing

## 2024-09-03 NOTE — OP NOTE
[facility] ATKINS right hip  OPERATIVE NOTE      Date of Operation:   9/3/2024    Providers Performing:   Surgeon(s):  Roly Adames III, MD  Assistant: Home Thompson MD    Preoperative Diagnosis:   Right hip osteoarthritis, M16.11    Postoperative Diagnosis:   Same    Operative Procedure:   Right Total Hip Arthroplasty, Anterior Approach, CPT 49209    Anesthesia:   Spinal+catheter  JUSTIN cocktail: Adames Block, no toradol    Estimated Blood Loss:   450 cc     Specimens:   None    Complications:   None, stable to PACU.    Implants Utilized:     Depuy  Emphasys three-hole acetabular shell; Size 54  Emphasys AOX polyethylene liner; 54 OD, 36 ID, neutral  Actis size 6 HO  Biolox Delta Ceramic 12/14 taper 36mm + 1.5  Acetabular screw 25mm, 25mm    aquamantys    Implant Name Type Inv. Item Serial No.  Lot No. LRB No. Used Action   emphasys acetabular shell three hole    [employer] [id number] Right 1 Implanted   SCREW PINNACLE 6.5 X 25 - [id number]  SCREW PINNACLE 6.5 X 25  [employer] [id number] Right 1 Implanted   SCREW PINNACLE 6.5 X 20 - [id number]  SCREW PINNACLE 6.5 X 20  [employer] [id number] Right 1 Implanted   emphasys polyethylene liner neutral aox    [employer] [id number] Right 1 Implanted   STEM ACTIS FEM COLLARED HIGH 6 - [id number]  STEM ACTIS FEM COLLARED HIGH 6  SYNTHES [id number] Right 1 Implanted   HEAD FEM 12/14 TAPER 1.5X36 - [id number]  HEAD FEM 12/14 TAPER 1.5X36  [employer] [id number] Right 1 Implanted       Indications:   The patient has longstanding right hip pain secondary to the above diagnosis.. They have failed conservative management which includes anti-inflammatory medications and activity modification. We reviewed the risks and benefits of the direct anterior hip replacement with the patient prior to surgery including risk of infection, lateral thigh numbness, blood clot, leg length inequality, dislocation, components loosening, components wearing out, need for revision surgery, continued pain, limping,  and injury to nerves and vessels.  After discussing the risks and benefits of the procedure they would like to proceed with surgery.    Operative Notes:   The patient was met in the holding area. The operative site was marked. Anesthesia administered spinal anesthesia. The patient was placed supine on a [place] table and the operative site was identified. The hip was prepped and draped in the usual fashion. IV antibiotics were administered and a timeout was performed.     I performed a direct anterior approach to the hip. Skin incision was made and the fascia of the tensor fascia jeanei was identified. I utilized the interval between the tensor fascia jeanie and sartorious for direct dissection to the hip joint. I identified and coagulated the ascending branch of the lateral circumflex vessel. Standard retractors over the anterior hip capsule were placed and the capsulotomy was performed. The capsule was tagged for retraction. The femoral head was identified and the femoral neck osteotomy was made in accordance with preoperative templating. The femoral head was then removed with a corkscrew.    The standard anterior, posterior, and superior retractors were placed around the acetabulum. The capsular labrum and foveal contents were removed. Sequential acetabular reamers were used to prepare the acetabulum. Once good good fit was achieved, the final acetabular cup was selected to be line to line in diameter than the last reamer used. The cup was checked for a good rim fit and a provisional impaction was performed to verify positioning on fluoroscopy. Once this was satisfactory, the impaction was completed. I checked to make sure there was no overhanging osteophytes anteriorly as well as making sure that there was not excessive acetabular cup exposed. Screws were placed in the cup to augment initial fixation. The final liner was impacted into place and I confirmed that it was well seated.    The hydraulic hook was placed  around the femur. The femur was externally rotated and the standard calcar and greater trochanter retractors were placed. A provisional posterior capsulotomy was performed. Then, gross traction was released and the leg was extended and adducted. The appropriate release was performed to allow elevation of the femur for good visualization of the femoral canal.     A box osteotome was used to open the femoral canal and a canal finder was used to sound the canal. The starter broach and sequential broaches were used until stability was appropriate. A trial reduction was performed and intraoperative fluoroscopy was used to confirm appropriate leg lengths and offset.  Once the trial femoral components appeared appropriately positioned, the hip was dislocated, the femur re-exposed, and the trial femoral components were removed. The canal was irrigated and the final femoral stem was impacted to the level of the neck cut. The final ball was impacted onto a dry trunnion and the hip was reduced checking for appropriate soft tissue tension. Final intra-operative fluoroscopy was obtained to confirm implant positioning, leg length, and offset.     While checking xray, a 0.35% betadine solution was left to soak in the wound. The wound was copiously irrigated. I checked for any residual bleeders and made sure that there was appropriate hemostasis. The local anesthetic cocktail was administered. 1 gram of vancomycin powder was placed in the wound. The wound was closed in layers using #1 vicryl at the fascia, then 2-0 vicryl, 3-0 monocryl, 4-0 monocryl and Prineo Mesh+Dermabond. A sterile dressing was placed.     There were no complications or evidence of intraoperative fracture. All counts were correct.    The first-assistant was critical to all steps of the operation, including retraction during exposure and bone preparation, as well as the deep and superficial wound closure.  Dr. Adames performed and/or supervised the key portions  of this surgical procedure, including evaluation of the bone cuts, reshaping of the bony elements, and insertion of the provisional and final components.    Post Op Plan:   The patient will be weightbearing as tolerated with a walker with no extremes of motion  ASA for DVT prophylaxis (81mg BID for one month)  24 hours of IV antibiotics.    Cbrex ok, watch GFR POD#1 labs  Pour/bethanechol    Due patient and or surgical factors the patient will receive an Rx for cefadroxil 500mg BID x7 days after discharge per [place] data:   Tabitha MM, Gwyn WALTERS, Jil BELTRAN, Daya ANNA. The [facility] Clinical Research Award: Extended Oral Antibiotics Prevent Periprosthetic Joint Infection in High-Risk Cases: 3855 Patients With 1-Year Follow-Up. J Arthroplasty. 2021 Jul;36(7S):S18-S25. doi: 10.1016/j.arth.[id number]. Epub 2021 Jan 23. PMID: [id number].        Signed by: Roly Adames III, MD

## 2024-09-03 NOTE — PLAN OF CARE
Pre op complete. Questions answered. Resting comfortably. Call light in reach. Personal belongings placed in locker. Pt needs walker after surgery.

## 2024-09-03 NOTE — TRANSFER OF CARE
"Anesthesia Transfer of Care Note    Patient: Jerel Serra    Procedure(s) Performed: Procedure(s) (LRB):  ARTHROPLASTY, HIP, TOTAL, ANTERIOR APPROACH: RIGHT: DEPUY - ACTIS + EMPHASYS (Right)    Patient location: PACU    Anesthesia Type: CSE    Transport from OR: Transported from OR on 6-10 L/min O2 by face mask with adequate spontaneous ventilation    Post pain: adequate analgesia    Post assessment: no apparent anesthetic complications and tolerated procedure well    Post vital signs: stable    Level of consciousness: responds to stimulation    Nausea/Vomiting: no nausea/vomiting    Complications: none    Transfer of care protocol was followed      Last vitals: Visit Vitals  BP 93/53   Pulse 73   Temp 36.4 °C (97.6 °F) (Oral)   Resp 16   Ht 5' 11" (1.803 m)   Wt 102.1 kg (225 lb)   SpO2 100%   BMI 31.38 kg/m²     "

## 2024-09-03 NOTE — PLAN OF CARE
Problem: Adult Inpatient Plan of Care  Goal: Plan of Care Review  Outcome: Progressing  Flowsheets (Taken 9/3/2024 1103)  Plan of Care Reviewed With:   spouse   patient     Problem: Adult Inpatient Plan of Care  Goal: Patient-Specific Goal (Individualized)  Outcome: Progressing  Flowsheets (Taken 9/3/2024 1103)  Individualized Care Needs: Pain management     Problem: Diabetes Comorbidity  Goal: Blood Glucose Level Within Targeted Range  Intervention: Monitor and Manage Glycemia  Flowsheets (Taken 9/3/2024 1103)  Glycemic Management: blood glucose monitored     Problem: Hip Arthroplasty  Goal: Absence of Bleeding  Intervention: Monitor and Manage Bleeding  Flowsheets (Taken 9/3/2024 1103)  Bleeding Management: dressing monitored     Problem: Hip Arthroplasty  Goal: Nausea and Vomiting Relief  Intervention: Prevent or Manage Nausea and Vomiting  Flowsheets (Taken 9/3/2024 1103)  Nausea/Vomiting Interventions:   nausea triggers minimized   stimuli minimized     Problem: Fall Injury Risk  Goal: Absence of Fall and Fall-Related Injury  Intervention: Identify and Manage Contributors  Flowsheets (Taken 9/3/2024 1103)  Medication Review/Management:   medications reviewed   high-risk medications identified     Problem: Fall Injury Risk  Goal: Absence of Fall and Fall-Related Injury  Intervention: Promote Injury-Free Environment  Flowsheets (Taken 9/3/2024 1103)  Safety Promotion/Fall Prevention:   assistive device/personal item within reach   Fall Risk reviewed with patient/family   instructed to call staff for mobility   high risk medications identified   medications reviewed   lighting adjusted   nonskid shoes/socks when out of bed   patient expresses understanding of fall risk and prevention   side rails raised x 2   Supervised toileting - stay within arms reach   family expresses understanding of fall risk and prevention     Problem: Comorbidity Management  Goal: Blood Pressure in Desired Range  Intervention: Maintain  Blood Pressure Management  Flowsheets (Taken 9/3/2024 1104)  Medication Review/Management:   medications reviewed   high-risk medications identified       Plan of care reviewed with patient, verbalized understanding. Medications reviewed and given as ordered. Rounding for safety and patient care per policy. Safety precautions maintained. Call light within reach, bed wheels locked, bed in lowest position, side rails up x2, patient instructed to call for assistance, DME at bedside.

## 2024-09-03 NOTE — ANESTHESIA POSTPROCEDURE EVALUATION
Anesthesia Post Evaluation    Patient: Jerel Serra    Procedure(s) Performed: Procedure(s) (LRB):  ARTHROPLASTY, HIP, TOTAL, ANTERIOR APPROACH: RIGHT: DEPUY - ACTIS + EMPHASYS (Right)    Final Anesthesia Type: CSE      Patient location during evaluation: PACU  Patient participation: Yes- Able to Participate  Level of consciousness: awake and alert and oriented  Post-procedure vital signs: reviewed and stable  Pain management: adequate  Airway patency: patent    PONV status at discharge: No PONV  Anesthetic complications: no      Cardiovascular status: hemodynamically stable  Respiratory status: nasal cannula  Hydration status: euvolemic  Follow-up not needed.          Vitals Value Taken Time   /63 09/03/24 1002   Temp 36.6 °C (97.9 °F) 09/03/24 1000   Pulse 63 09/03/24 1013   Resp 19 09/03/24 1013   SpO2 99 % 09/03/24 1013   Vitals shown include unfiled device data.      Event Time   Out of Recovery 10:11:02         Pain/Aneesh Score: Pain Rating Prior to Med Admin: 7 (9/3/2024  9:48 AM)  Aneesh Score: 10 (9/3/2024 10:05 AM)

## 2024-09-03 NOTE — NURSING
Patient arrived to unit AAOx3, In hospital bed from PACU. VSS and IVF infusing. Dressing to right hip, CDI. See assessment. Patient oriented to room. Bed in lowest position, side rails up x2, call light within reach, patient instructed to call for assistance, verbalized understanding. Will continue to monitor.     Nurses Note -- 4 Eyes      9/3/2024   10:59 AM      Skin assessed during: Admit      [x] No Altered Skin Integrity Present    []Prevention Measures Documented      [] Yes- Altered Skin Integrity Present or Discovered   [] LDA Added if Not in Epic (Describe Wound)   [] New Altered Skin Integrity was Present on Admit and Documented in LDA   [] Wound Image Taken    Wound Care Consulted? No    Attending Nurse:  Chantale Olea RN/Staff Member:  Betsy

## 2024-09-03 NOTE — ANESTHESIA PROCEDURE NOTES
CSE    Patient location during procedure: OR  Start time: 9/3/2024 7:02 AM  Timeout: 9/3/2024 7:01 AM  End time: 9/3/2024 7:08 AM      Staffing  Authorizing Provider: Fide Frazier MD  Performing Provider: Fide Frazier MD    Staffing  Performed by: Fide Frazier MD  Authorized by: Fide Frazier MD    Preanesthetic Checklist  Completed: patient identified, IV checked, site marked, risks and benefits discussed, surgical consent, monitors and equipment checked, pre-op evaluation and timeout performed  CSE  Patient position: sitting  Prep: ChloraPrep  Patient monitoring: heart rate, cardiac monitor, continuous pulse ox and continuous capnometry  Approach: midline  Spinal Needle  Needle length: 4 in  Epidural Needle  Injection technique: JINNY air  Needle type: Tuohy   Needle gauge: 17 G  Needle length: 3.5 in  Needle insertion depth: 7 cm  Location: L2-3  Needle localization: anatomical landmarks   Catheter  Catheter type: springwound  Catheter at skin depth: 12 cm  Additional Documentation: incremental injection  Assessment  Sensory level: T8  Intrathecal Medications:   administered: primary anesthetic mcg of    Medications:    Medications: Intrathecal - mepivacaine (CARBOCAINE) injection 15 mg/mL (1.5%) - Intrathecal   3 mL - 9/3/2024 7:05:00 AM

## 2024-09-03 NOTE — ASSESSMENT & PLAN NOTE
Jerel Serra is a 58 y.o. male is s/p R TEOFILO on 9/3/2024    Surgical dressing C/D/I  Pain control: multimodal, Anesthesia Surgical Home following  PT/OT: WBAT RLE, anterior hip precautions  DVT PPx: ASA 81 BID, FCDs at all times when not ambulating  Abx: postop Ancef + cefadroxil   Drain: none  Scott: none, POUR + Bethanecol 10mg Q1h x3: UOP 3300 overnight     Dispo: plan to dc to home today once cleared by PT/OT

## 2024-09-03 NOTE — NURSING
Noted low and moderate correction insulin dosages. Spoke with Dr. Brittni MD by phone. Orders received to D/C low correction dose.

## 2024-09-03 NOTE — ANESTHESIA PREPROCEDURE EVALUATION
09/03/2024  Jerel Serra is a 58 y.o., male.    Procedure: ARTHROPLASTY, HIP, TOTAL, ANTERIOR APPROACH: RIGHT: DEPUY - ACTIS + EMPHASYS (Right)   Anesthesia type: Spinal/Epidural   Diagnosis: Primary osteoarthritis of right hip [M16.11]     Patient Active Problem List   Diagnosis    Primary hypertension    Chronic gout    Pure hypercholesterolemia    Type 2 diabetes mellitus with hyperglycemia    Hepatitis B core antibody positive    Metabolic dysfunction-associated steatotic liver disease (MASLD)    Male erectile disorder    Renal insufficiency     Past Surgical History:   Procedure Laterality Date    HIP SURGERY Left 2020     Current Discharge Medication List        CONTINUE these medications which have NOT CHANGED    Details   amlodipine-benazepril 5-20 mg (LOTREL) 5-20 mg per capsule     Comments: .      atorvastatin (LIPITOR) 20 MG tablet Take 1 tablet by mouth once daily.      metFORMIN (GLUCOPHAGE) 500 MG tablet 1,000 mg 2 (two) times a day.      multivitamin with minerals tablet Take 1 tablet by mouth once daily.      acetaminophen (TYLENOL) 650 MG TbSR Take 1 tablet (650 mg total) by mouth every 8 (eight) hours.  Qty: 120 tablet, Refills: 0    Associated Diagnoses: S/P hip replacement, right      allopurinol (ZYLOPRIM) 300 MG tablet Take 300 mg by mouth once daily.      aspirin (ECOTRIN) 81 MG EC tablet Take 1 tablet (81 mg total) by mouth 2 (two) times a day.  Qty: 60 tablet, Refills: 0    Comments: Bedside delivery. Surgery 9/3.  Associated Diagnoses: S/P hip replacement, right      cefadroxil (DURICEF) 500 MG Cap Take 1 capsule (500 mg total) by mouth every 12 (twelve) hours.  Qty: 14 capsule, Refills: 0    Associated Diagnoses: S/P hip replacement, right      celecoxib (CELEBREX) 200 MG capsule Take 1 capsule (200 mg total) by mouth once daily.  Qty: 30 capsule, Refills: 0    Comments:  Bedside delivery. Surgery 9/3. GFR was low, so will be rechecked prior to discharge.  Associated Diagnoses: S/P hip replacement, right      methocarbamoL (ROBAXIN) 750 MG Tab Take 1 tablet (750 mg total) by mouth 4 (four) times daily as needed (muscle spasms).  Qty: 40 tablet, Refills: 0    Associated Diagnoses: S/P hip replacement, right      oxyCODONE (ROXICODONE) 5 MG immediate release tablet Take 1 tablet by mouth every 6 hours as needed for pain  Qty: 28 tablet, Refills: 0    Comments: Quantity prescribed more than 7 day supply? No  Associated Diagnoses: S/P hip replacement, right      pantoprazole (PROTONIX) 40 MG tablet Take 1 tablet (40 mg total) by mouth once daily.  Qty: 30 tablet, Refills: 0    Associated Diagnoses: S/P hip replacement, right      senna-docusate 8.6-50 mg (SENNA WITH DOCUSATE SODIUM) 8.6-50 mg per tablet Take 1 tablet by mouth once daily.  Qty: 30 tablet, Refills: 0    Associated Diagnoses: S/P hip replacement, right      sildenafiL (VIAGRA) 50 MG tablet Take 50 mg by mouth.           Pre-op Assessment    I have reviewed the Patient Summary Reports.     I have reviewed the Nursing Notes. I have reviewed the NPO Status.   I have reviewed the Medications.     Review of Systems  Anesthesia Hx:             Denies Family Hx of Anesthesia complications.    Denies Personal Hx of Anesthesia complications.                        Physical Exam    Airway:  Mallampati: II / II  Mouth Opening: Normal  TM Distance: Normal  Tongue: Normal  Neck ROM: Normal ROM    Dental:  Intact      Anesthesia Assessment: Preoperative EQUATION    Planned Procedure: Procedure(s) (LRB):  ARTHROPLASTY, HIP, TOTAL, ANTERIOR APPROACH: RIGHT: DEPUY - ACTIS + EMPHASYS (Right)  Requested Anesthesia Type:Spinal/Epidural  Surgeon: Roly Adames III, MD  Service: Orthopedics  Known or anticipated Date of Surgery:9/3/2024    Surgeon notes: reviewed    Electronic QUestionnaire Assessment completed via nurse interview with  patient.        Triage considerations:     The patient has no apparent active cardiac condition (No unstable coronary Syndrome such as severe unstable angina or recent [<1 month] myocardial infarction, decompensated CHF, severe valvular   disease or significant arrhythmia)    Previous anesthesia records:Not available    Last PCP note: within 1 month , outside [facility]   Subspecialty notes: Hepatology, Ortho    Other important co-morbidities: DM2, HLD, HTN, Obesity, and Hep B, Fatty Liver, Liver Fibrosis, Gout, LTHA 2019       Tests already available:  Available tests,  outside [facility] , within [facility] .   6/19/24 Hep B, Hepatic Function Panel, CBC  4/25/24 A1C  2/23/24 CMP   2/23/24 Abd US  8/24/20 EKG             Instructions given. (See in Nurse's note)    Optimization:  Anesthesia Preop Clinic Assessment  Indicated POC    Medical Opinion Indicated Hepatology        Sub-specialist consult indicated:   [facility] Pre Op [facility] NP         Plan:    Testing:  A1C, CMP, EKG, and PT/INR   Pre-anesthesia  visit       Visit focus: possible regional anesthesia and/or nerve block      Consultation:Pre Op Center NP for medical and anesthesia optimization       Patient  has previously scheduled Medical Appointment: 8/14    Navigation: Tests Scheduled.              Consults scheduled.             Results will be tracked by Preop Clinic.                7/10/24 Hepatology ([facility]):   Need for hip surgery  No contraindication from liver standpoint   no chronic/active Hep B    8/14/24 Patient is optimized     Cr 1.6  BUN 25  GFR 49.6     Recheck Renal panel 8/28/24     I spent a total of 41 minutes on the day of the visit.This includes face to face time and non-face to face time preparing to see the patient (eg, review of tests), obtaining and/or reviewing separately obtained history, documenting clinical information in the electronic or other health record, independently interpreting results and communicating results to the  patient/family/caregiver, or care coordinator.    Stefan Green NP  Perioperative Medicine  [facility]   Pager [phone]    Anesthesia Plan  Type of Anesthesia, risks & benefits discussed:    Anesthesia Type: Gen Natural Airway, CSE  Intra-op Monitoring Plan: Standard ASA Monitors  Post Op Pain Control Plan: multimodal analgesia and IV/PO Opioids PRN  Induction:  IV  Airway Plan: Direct  Informed Consent: Informed consent signed with the Patient and all parties understand the risks and agree with anesthesia plan.  All questions answered.   ASA Score: 3    Ready For Surgery From Anesthesia Perspective.   .

## 2024-09-03 NOTE — PROGRESS NOTES
Patient transferred to [facility] via bed. Pain at tolerable level, no complaints of nausea, tolerating sips of water. Bilateral +2 pedal pulses, bilateral strong plantar and dorsal flexion. Spouse at bedside and call light within reach.

## 2024-09-04 ENCOUNTER — PATIENT MESSAGE (OUTPATIENT)
Dept: ORTHOPEDICS | Facility: CLINIC | Age: 59
End: 2024-09-04
Payer: COMMERCIAL

## 2024-09-04 VITALS
HEART RATE: 73 BPM | TEMPERATURE: 98 F | WEIGHT: 225 LBS | HEIGHT: 71 IN | BODY MASS INDEX: 31.5 KG/M2 | DIASTOLIC BLOOD PRESSURE: 89 MMHG | RESPIRATION RATE: 18 BRPM | SYSTOLIC BLOOD PRESSURE: 156 MMHG | OXYGEN SATURATION: 97 %

## 2024-09-04 LAB
BUN SERPL-MCNC: 14 MG/DL (ref 6–30)
CHLORIDE SERPL-SCNC: 104 MMOL/L (ref 95–110)
CREAT SERPL-MCNC: 0.9 MG/DL (ref 0.5–1.4)
GLUCOSE SERPL-MCNC: 170 MG/DL (ref 70–110)
HCT VFR BLD CALC: 40 %PCV (ref 36–54)
POC IONIZED CALCIUM: 1.15 MMOL/L (ref 1.06–1.42)
POC TCO2 (MEASURED): 24 MMOL/L (ref 23–29)
POCT GLUCOSE: 163 MG/DL (ref 70–110)
POTASSIUM BLD-SCNC: 4.1 MMOL/L (ref 3.5–5.1)
SAMPLE: ABNORMAL
SODIUM BLD-SCNC: 140 MMOL/L (ref 136–145)

## 2024-09-04 PROCEDURE — 82330 ASSAY OF CALCIUM: CPT

## 2024-09-04 PROCEDURE — 97535 SELF CARE MNGMENT TRAINING: CPT

## 2024-09-04 PROCEDURE — 94761 N-INVAS EAR/PLS OXIMETRY MLT: CPT

## 2024-09-04 PROCEDURE — 99900035 HC TECH TIME PER 15 MIN (STAT)

## 2024-09-04 PROCEDURE — 25000003 PHARM REV CODE 250: Performed by: ANESTHESIOLOGY

## 2024-09-04 PROCEDURE — 84132 ASSAY OF SERUM POTASSIUM: CPT

## 2024-09-04 PROCEDURE — 97116 GAIT TRAINING THERAPY: CPT

## 2024-09-04 PROCEDURE — 82565 ASSAY OF CREATININE: CPT

## 2024-09-04 PROCEDURE — 25000003 PHARM REV CODE 250: Performed by: SURGERY

## 2024-09-04 PROCEDURE — 25000003 PHARM REV CODE 250: Performed by: STUDENT IN AN ORGANIZED HEALTH CARE EDUCATION/TRAINING PROGRAM

## 2024-09-04 PROCEDURE — 25000003 PHARM REV CODE 250: Performed by: NURSE PRACTITIONER

## 2024-09-04 PROCEDURE — 84295 ASSAY OF SERUM SODIUM: CPT

## 2024-09-04 PROCEDURE — 85014 HEMATOCRIT: CPT

## 2024-09-04 PROCEDURE — 97110 THERAPEUTIC EXERCISES: CPT

## 2024-09-04 PROCEDURE — 97530 THERAPEUTIC ACTIVITIES: CPT

## 2024-09-04 RX ORDER — METFORMIN HYDROCHLORIDE 500 MG/1
1000 TABLET ORAL 2 TIMES DAILY
Status: DISCONTINUED | OUTPATIENT
Start: 2024-09-04 | End: 2024-09-04 | Stop reason: HOSPADM

## 2024-09-04 RX ADMIN — FAMOTIDINE 20 MG: 20 TABLET ORAL at 08:09

## 2024-09-04 RX ADMIN — POLYETHYLENE GLYCOL 3350 17 G: 17 POWDER, FOR SOLUTION ORAL at 08:09

## 2024-09-04 RX ADMIN — SENNOSIDES AND DOCUSATE SODIUM 1 TABLET: 50; 8.6 TABLET ORAL at 08:09

## 2024-09-04 RX ADMIN — ACETAMINOPHEN 1000 MG: 500 TABLET ORAL at 05:09

## 2024-09-04 RX ADMIN — METHOCARBAMOL 750 MG: 750 TABLET ORAL at 08:09

## 2024-09-04 RX ADMIN — ASPIRIN 81 MG: 81 TABLET, COATED ORAL at 08:09

## 2024-09-04 RX ADMIN — ATORVASTATIN CALCIUM 20 MG: 20 TABLET, FILM COATED ORAL at 08:09

## 2024-09-04 RX ADMIN — MUPIROCIN 1 G: 20 OINTMENT TOPICAL at 08:09

## 2024-09-04 RX ADMIN — INSULIN ASPART 2 UNITS: 100 INJECTION, SOLUTION INTRAVENOUS; SUBCUTANEOUS at 06:09

## 2024-09-04 RX ADMIN — ALLOPURINOL 300 MG: 300 TABLET ORAL at 08:09

## 2024-09-04 RX ADMIN — METFORMIN HYDROCHLORIDE 1000 MG: 500 TABLET ORAL at 09:09

## 2024-09-04 NOTE — DISCHARGE SUMMARY
[facility])  Orthopedics  Discharge Summary      Patient Name: Jerel Serra  MRN: [id number]  Admission Date: 9/3/2024  Hospital Length of Stay: 0 days  Discharge Date and Time: 9/4/2024 11:30 AM  Attending Physician: Mary Lou att. providers found   Discharging Provider: Mekhi Thompson MD  Primary Care Provider: Wiley Piedra MD    HPI:   CC:  Right hip pain     Jerel Serra is a 58 y.o. male with Right hip pain.  Pain is worse with activity and weight bearing.  Patient has experienced interference of activities of daily living due to increased pain and decreased range of motion. Patient has failed non-operative treatment including NSAIDs, as well as greater than 3 months of activity modification. Jerel Serra ambulates independently.      Relevant medical conditions of significance in perioperative period:  DM- on medication managed by pcp  HTN- on medication managed by pcp  HLD- on medication managed by pcp  Gout- on medication managed by pcp    Procedure(s) (LRB):  ARTHROPLASTY, HIP, TOTAL, ANTERIOR APPROACH: RIGHT: DEPUY - ACTIS + EMPHASYS (Right)      Hospital Course:  On 9/3/2024, the patient arrived to the [facility] for proper pre-operative management.  Upon completion of pre-operative preparation, the patient was taken back to the operative theatre. R TEOFILO was performed without complication and the patient was transported to the post anesthesia care unit in stable condition.  After appropriate recovery from the anaesthetic agents used during the surgery, the patient was then transported to the hospital inpatient floor.  The interim of the hospital stay from arrival on the floor up to discharge has been uncomplicated. The patient has tolerated regular diet.  The patient's pain has been controlled using a multimodal approach. Currently, the patient's pain is well controlled on an oral regimen.  The patient has been voiding without difficulty.  The patient began participation in  physical therapy after surgery and has progressed throughout the entire hospital stay.  Currently, the patient's progress is sufficient to allow the them to be discharged to home safely.  The patient agrees with this assessment and desires a discharge today.      Goals of Care Treatment Preferences:  Code Status: Full Code      Consults (From admission, onward)          Status Ordering Provider     Inpatient consult to Social Work  Once        Provider:  (Not yet assigned)    Acknowledged CAROL RAMOS     Inpatient consult to Respiratory Care  Once        Provider:  (Not yet assigned)    Acknowledged CAROL RAMOS            Significant Diagnostic Studies: No pertinent studies.    Pending Diagnostic Studies:       None          Final Active Diagnoses:    Diagnosis Date Noted POA    PRINCIPAL PROBLEM:  Primary osteoarthritis of right hip, s/p R TEOFILO 9/3/24 [M16.11] 09/03/2024 Yes      Problems Resolved During this Admission:      Discharged Condition: good    Disposition: Home or Self Care    Follow Up:    Patient Instructions:      Activity as tolerated     Lifting restrictions   Order Comments: No strenuous exercise or lifting of > 10 lbs     Weight bearing as tolerated     No driving, operating heavy equipment or signing legal documents while taking pain medication     Leave dressing on - Keep it clean, dry, and intact until clinic visit   Order Comments: Do not remove surgical dressing for 2 weeks post-op. This will be done only by MD at initial post-op visit. If dressing is completely saturated, replace with identical dressing - silver-impregnated hydrocolloid dressing.    Do not get dressings wet. Do not shower.    If dressing continues to be saturated or there are signs of infection, please call [facility] [phone] for further instructions and to make appt to be seen.     Call MD for: fluid/liquid/drainage on dressing     Call MD for:  temperature >100.4     Call MD for:  persistent nausea and  vomiting     Call MD for:  severe uncontrolled pain     Call MD for:  difficulty breathing, headache or visual disturbances     Call MD for:  redness, tenderness, or signs of infection (pain, swelling, redness, odor or green/yellow discharge around incision site)     Call MD for:  hives     Call MD for:  persistent dizziness or light-headedness     Call MD for:  extreme fatigue     Ok to shower at home, running water only, towel dry, use shower chair for safety, no soaking in a tub or pool for one month.     Medications:  Reconciled Home Medications:      Medication List        CONTINUE taking these medications      acetaminophen 650 MG Tbsr  Commonly known as: TYLENOL  Take 1 tablet (650 mg total) by mouth every 8 (eight) hours.     allopurinoL 300 MG tablet  Commonly known as: ZYLOPRIM  Take 300 mg by mouth once daily.     amlodipine-benazepril 5-20 mg 5-20 mg per capsule  Commonly known as: LOTREL     aspirin 81 MG EC tablet  Commonly known as: ECOTRIN  Take 1 tablet (81 mg total) by mouth 2 (two) times a day.     atorvastatin 20 MG tablet  Commonly known as: LIPITOR  Take 1 tablet by mouth once daily.     cefadroxil 500 MG Cap  Commonly known as: DURICEF  Take 1 capsule (500 mg total) by mouth every 12 (twelve) hours.     celecoxib 200 MG capsule  Commonly known as: CeleBREX  Take 1 capsule (200 mg total) by mouth once daily.     metFORMIN 500 MG tablet  Commonly known as: GLUCOPHAGE  1,000 mg 2 (two) times a day.     methocarbamoL 750 MG Tab  Commonly known as: ROBAXIN  Take 1 tablet (750 mg total) by mouth 4 (four) times daily as needed (muscle spasms).     multivitamin with minerals tablet  Take 1 tablet by mouth once daily.     oxyCODONE 5 MG immediate release tablet  Commonly known as: ROXICODONE  Take 1 tablet by mouth every 6 hours as needed for pain     pantoprazole 40 MG tablet  Commonly known as: PROTONIX  Take 1 tablet (40 mg total) by mouth once daily.     SENEXON-S 8.6-50 mg per tablet  Generic  drug: senna-docusate 8.6-50 mg  Take 1 tablet by mouth once daily.            ASK your doctor about these medications      sildenafiL 50 MG tablet  Commonly known as: VIAGRA  Take 50 mg by mouth.              Mekhi Thompson MD  Orthopedics  [facility])

## 2024-09-04 NOTE — NURSING
Has met unit/department guidelines for discharge from each phase of the post procedure continuum. Patient discharged.  Instructions, placed in dc folder and Prescriptions given.  IV removed, tolerated well, w/ catheter intact, no redness or swelling to area. . Dressing to right hip remains CDI. Patient verbalized understanding instructions.  AAOx3, VSS, NADN, no complaints of pain noted at this time.  Wheelchair to private vehicle in care of fiance.  All personal belongings sent with pt.  Blue Bracelet given applied to pts wrist and instructions given to call # on bracelet w/any surgery related issues.

## 2024-09-04 NOTE — PROGRESS NOTES
Acute Pain Service and Perioperative Surgical Home Progress Note    Interval history  Jerel Serra is a 58 y.o., male,     Surgery:  Procedure(s) (LRB):  ARTHROPLASTY, HIP, TOTAL, ANTERIOR APPROACH: RIGHT: DEPUY - ACTIS + EMPHASYS (Right)    Post Op Day #: 1    Pt rested well overnight.  NV intact with good strength in lower extremities.  He is ambulating to and from restroom with assistance.  Good urine output.  No nausea or vomiting.  Eating and drinking well.  Pain well managed on multimodal regimen.  Blood pressure was elevated 160s/90s at 11pm, so he was given an extra dose of medication.    Problem List:    Active Hospital Problems    Diagnosis  POA    *Primary osteoarthritis of right hip, s/p R TEOFILO 9/3/24 [M16.11]  Yes      Resolved Hospital Problems   No resolved problems to display.       Subjective:       General appearance of alert, oriented, no complaints   Pain with rest: 2    Numbers   Pain with movement: 2    Numbers   Side Effects    1. Pruritis No    2. Nausea No    3. Motor Blockade No, 0=Ability to raise lower extremities off bed    4. Sedation Yes, 1=awake and alert    Schedule Medications:    acetaminophen  1,000 mg Oral Q6H    allopurinoL  300 mg Oral Daily    amlodipine-benazepril 5-20 mg  1 capsule Oral Daily    aspirin  81 mg Oral BID    atorvastatin  20 mg Oral Daily    famotidine  20 mg Oral BID    methocarbamoL  750 mg Oral TID    mupirocin  1 g Nasal BID    polyethylene glycol  17 g Oral Daily    pregabalin  75 mg Oral QHS    senna-docusate 8.6-50 mg  1 tablet Oral BID        Continuous Infusions:   0.9% NaCl   Intravenous Continuous 150 mL/hr at 09/03/24 2200 New Bag at 09/03/24 2200        PRN Medications:    Current Facility-Administered Medications:     dextrose 10%, 12.5 g, Intravenous, PRN    dextrose 10%, 12.5 g, Intravenous, PRN    dextrose 10%, 25 g, Intravenous, PRN    dextrose 10%, 25 g, Intravenous, PRN    glucagon (human recombinant), 1 mg, Intramuscular, PRN    glucose,  "16 g, Oral, PRN    glucose, 24 g, Oral, PRN    insulin aspart U-100, 0-10 Units, Subcutaneous, QID (AC + HS) PRN    morphine, 2 mg, Intravenous, Q3H PRN    naloxone, 0.02 mg, Intravenous, PRN    ondansetron, 4 mg, Intravenous, Q8H PRN    oxyCODONE, 5 mg, Oral, Q3H PRN    oxyCODONE, 10 mg, Oral, Q3H PRN    polyethylene glycol, 17 g, Oral, Daily PRN    prochlorperazine, 5 mg, Intravenous, Q6H PRN       Antibiotics:  Antibiotics (From admission, onward)      Start     Stop Route Frequency Ordered    09/03/24 1045  mupirocin 2 % ointment 1 g         09/08/24 0859 Nasl 2 times daily 09/03/24 1044               Objective:         Vital Signs (Most Recent):  Temp: 97.8 °F (36.6 °C) (09/04/24 0431)  Pulse: 72 (09/04/24 0431)  Resp: 18 (09/04/24 0431)  BP: (!) 152/85 (09/04/24 0431)  SpO2: 98 % (09/04/24 0431) Vital Signs Range (Last 24H):  Temp:  [96.2 °F (35.7 °C)-98.2 °F (36.8 °C)]   Pulse:  [59-87]   Resp:  [10-20]   BP: ()/(53-99)   SpO2:  [96 %-100 %]          I & O (Last 24H):  Intake/Output Summary (Last 24 hours) at 9/4/2024 0542  Last data filed at 9/4/2024 0054  Gross per 24 hour   Intake 4281.93 ml   Output 2850 ml   Net 1431.93 ml       Physical Exam:    GA: Alert, comfortable, no acute distress.   Pulmonary: Clear to auscultation . Normal respiratory ratei.  Cardiac: regular rate and rhythm.          Laboratory: reviewed in chart  CBC:   Recent Labs     09/04/24 0432   HCT 40       BMP: No results for input(s): "NA", "K", "CO2", "CL", "BUN", "CREATININE", "GLU", "MG", "PHOS", "CALCIUM" in the last 72 hours.    No results for input(s): "PT", "INR", "PROTIME", "APTT" in the last 72 hours.          Assessment:         Pain control adequate    Plan:  1) Pain: Multimodal pain regimen ordered which includes acetaminophen, celecoxib, pregabalin, and prn oxycodone.  Well controlled on current regimen.  Will continue to monitor.    2) HTN: continue home regimen   3) DM: diabetic diet and sliding scale   4) " FEN/GI: Tolerating regular diet.     5) Dispo: Pt working well with PT/OT. Case management and SW following along for setting up home health and physical therapy. Plan to discharge home this am.              Evaluator Shima Willams PA-C

## 2024-09-04 NOTE — ANESTHESIA POSTPROCEDURE EVALUATION
Anesthesia Post Evaluation    Patient: Jerel Serra    Procedure(s) Performed: Procedure(s) (LRB):  ARTHROPLASTY, HIP, TOTAL, ANTERIOR APPROACH: RIGHT: DEPUY - ACTIS + EMPHASYS (Right)    Final Anesthesia Type: CSE      Patient location during evaluation: PACU  Patient participation: Yes- Able to Participate  Level of consciousness: awake and alert and oriented  Post-procedure vital signs: reviewed and stable  Pain management: adequate  Airway patency: patent    PONV status at discharge: No PONV  Anesthetic complications: no      Cardiovascular status: hemodynamically stable  Respiratory status: nasal cannula  Hydration status: euvolemic  Follow-up not needed.          Vitals Value Taken Time   /89 09/04/24 1123   Temp 36.7 °C (98 °F) 09/04/24 1123   Pulse 73 09/04/24 1123   Resp 18 09/04/24 1123   SpO2 97 % 09/04/24 1123         Event Time   Out of Recovery 10:11:02         Pain/Aneesh Score: Pain Rating Prior to Med Admin: 3 (9/4/2024  5:27 AM)  Pain Rating Post Med Admin: 3 (9/4/2024  6:27 AM)  Aneesh Score: 10 (9/3/2024 10:05 AM)

## 2024-09-04 NOTE — PT/OT/SLP PROGRESS
"Occupational Therapy   Treatment and Discharge Note    Name: Jerel Serra  MRN: [id number]  Admitting Diagnosis:  Primary osteoarthritis of right hip  1 Day Post-Op    Recommendations:     Discharge Recommendations: Low Intensity Therapy  Discharge Equipment Recommendations:  bedside commode, shower chair, walker, rolling  Barriers to discharge:  None    Assessment:     Jerel Serra is a 58 y.o. male with a medical diagnosis of Primary osteoarthritis of right hip.  He presents with R TEOFILO. Performance deficits affecting function are impaired self care skills, impaired functional mobility, orthopedic precautions. Pt was able to perform Sit <> Stand Transfer with modified independence with rolling walker Bed <> Chair Transfer using Stand Pivot technique with modified independence with rolling walker Toilet Transfer Stand Pivot technique with modified independence with rolling walker and bedside commode.  Able to perform UB/LB dressing c modified independence  Educated pt on bathing, car transfers, and cryotherapy.       Rehab Prognosis:  Good; patient would benefit from acute skilled OT services to address these deficits and reach maximum level of function.       Plan:     Patient to be seen daily to address the above listed problems via self-care/home management, therapeutic activities, therapeutic exercises  Plan of Care Expires: 09/04/24  Plan of Care Reviewed with: patient    Subjective     Chief Complaint: R TEOFILO  Patient/Family Comments/goals: "I am ready to go!"  Pain/Comfort:  Pain Rating 1: 5/10    Objective:     Communicated with: RN prior to session.  Patient found up in chair with cryotherapy, FCD upon OT entry to room.    General Precautions: Standard, fall    Orthopedic Precautions:RLE weight bearing as tolerated, RLE anterior precautions (0-90* of hip flexion and no extremes of motion)  Braces: N/A  Respiratory Status: Room air     Occupational Performance:     Functional Mobility/Transfers:  Patient " completed Sit <> Stand Transfer with modified independence  with  rolling walker   Patient completed Bed <> Chair Transfer using Stand Pivot technique with modified independence with rolling walker  Patient completed Toilet Transfer Stand Pivot technique with modified independence with  rolling walker and bedside commode  Functional Mobility: Pt was able to walk to bathroom c CGA and RW.    Activities of Daily Living:  Upper Body Dressing: modified independence to don shirt.  Lower Body Dressing: modified independence to don underwear, shorts, socks, and shoes c reacher, sock-aid, and long handled shoe horn.      [facility] 6 Click ADL: 23      Patient left up in chair with all lines intact, call button in reach, RN notified, and present    GOALS:   Multidisciplinary Problems       Occupational Therapy Goals          Problem: Occupational Therapy    Goal Priority Disciplines Outcome Interventions   Occupational Therapy Goal     OT, PT/OT Progressing    Description: Goals to be met by: 9/4/24     Patient will increase functional independence with ADLs by performing:    UE Dressing with Modified [place].  LE Dressing with Modified [place] and Assistive Devices as needed.  Grooming while standing at sink with Modified [place].  Toileting from bedside commode with Modified [place] for hygiene and clothing management.   Bathing from  shower chair/bench with Modified [place].  Toilet transfer to bedside commode with Modified [place].  Pt will state 3/3 hip precautions correctly.                         Time Tracking:     OT Date of Treatment: 09/04/24  OT Start Time: 0734  OT Stop Time: 0804  OT Total Time (min): 30 min    Billable Minutes:Self Care/Home Management 15  Therapeutic Activity 15               9/4/2024

## 2024-09-04 NOTE — PLAN OF CARE
Problem: Pain Acute  Goal: Optimal Pain Control and Function  Intervention: Develop Pain Management Plan  Flowsheets (Taken 9/4/2024 0400)  Pain Management Interventions:   relaxation techniques promoted   quiet environment facilitated   cold applied     Problem: Hip Arthroplasty  Goal: Absence of Bleeding  Intervention: Monitor and Manage Bleeding  Flowsheets (Taken 9/4/2024 0400)  Bleeding Management: dressing monitored     Problem: Wound  Goal: Absence of Infection Signs and Symptoms  Intervention: Prevent or Manage Infection  Flowsheets (Taken 9/4/2024 0400)  Infection Management: aseptic technique maintained

## 2024-09-04 NOTE — PROGRESS NOTES
[facility])  Orthopedics  Progress Note    Patient Name: Jerel Serra  MRN: [id number]  Admission Date: 9/3/2024  Hospital Length of Stay: 0 days  Attending Provider: Roly Adames III, MD  Primary Care Provider: Wiley Piedra MD  Follow-up For: Procedure(s) (LRB):  ARTHROPLASTY, HIP, TOTAL, ANTERIOR APPROACH: RIGHT: DEPUY - ACTIS + EMPHASYS (Right)    Post-Operative Day: 1 Day Post-Op  Subjective:     Principal Problem:Primary osteoarthritis of right hip    Principal Orthopedic Problem: Same    Interval History: NAEON, patient stable, pain controlled. No acute complaints this morning. Walked 140ft with PT yesterday. Dressings CDI. .     Review of patient's allergies indicates:  No Known Allergies    Current Facility-Administered Medications   Medication    0.9%  NaCl infusion    acetaminophen tablet 1,000 mg    allopurinoL tablet 300 mg    amlodipine-benazepril 5-20 mg per capsule 1 capsule    aspirin EC tablet 81 mg    atorvastatin tablet 20 mg    dextrose 10% bolus 125 mL 125 mL    dextrose 10% bolus 125 mL 125 mL    dextrose 10% bolus 250 mL 250 mL    dextrose 10% bolus 250 mL 250 mL    famotidine tablet 20 mg    glucagon (human recombinant) injection 1 mg    glucose chewable tablet 16 g    glucose chewable tablet 24 g    insulin aspart U-100 pen 0-10 Units    methocarbamoL tablet 750 mg    morphine injection 2 mg    mupirocin 2 % ointment 1 g    naloxone 0.4 mg/mL injection 0.02 mg    ondansetron injection 4 mg    oxyCODONE immediate release tablet 5 mg    oxyCODONE immediate release tablet Tab 10 mg    polyethylene glycol packet 17 g    polyethylene glycol packet 17 g    pregabalin capsule 75 mg    prochlorperazine injection Soln 5 mg    senna-docusate 8.6-50 mg per tablet 1 tablet     Objective:     Vital Signs (Most Recent):  Temp: 97.8 °F (36.6 °C) (09/04/24 0431)  Pulse: 72 (09/04/24 0431)  Resp: 18 (09/04/24 0431)  BP: (!) 152/85 (09/04/24 0431)  SpO2: 98 % (09/04/24  "0431) Vital Signs (24h Range):  Temp:  [96.2 °F (35.7 °C)-98.2 °F (36.8 °C)] 97.8 °F (36.6 °C)  Pulse:  [59-87] 72  Resp:  [10-20] 18  SpO2:  [96 %-100 %] 98 %  BP: ()/(53-99) 152/85     Weight: 102.1 kg (225 lb)  Height: 5' 11" (180.3 cm)  Body mass index is 31.38 kg/m².      Intake/Output Summary (Last 24 hours) at 9/4/2024 0708  Last data filed at 9/4/2024 0600  Gross per 24 hour   Intake 4281.93 ml   Output 3300 ml   Net 981.93 ml        Ortho/SPM Exam  AAOx4  NAD  Reg rate  No increased WOB    RLE:  Dressing c/d/i  SILT T/SP/DP/Walker/Sa  Motor intact T/SP/DP  WWP extremities  FCDs in place and functioning       Significant Labs:   Recent Lab Results  (Last 5 results in the past 24 hours)        09/04/24  0634   09/04/24  0432   09/03/24  2123   09/03/24  1701   09/03/24  0910        POC BUN   14             POC Chloride   104             POC Creatinine   0.9             POC Glucose   170             POC Hematocrit   40             POC Ionized Calcium   1.15             POC Potassium   4.1             POC Sodium   140             POC TCO2 (MEASURED)   24             POCT Glucose 163     259   241   186       Sample   VENOUS                                  All pertinent labs within the past 24 hours have been reviewed.    Significant Imaging: I have reviewed and interpreted all pertinent imaging results/findings.  Assessment/Plan:     * Primary osteoarthritis of right hip, s/p R TEOFILO 9/3/24  Jerel Serra is a 58 y.o. male is s/p R TEOFILO on 9/3/2024    Surgical dressing C/D/I  Pain control: multimodal, Anesthesia Surgical Home following  PT/OT: WBAT RLE, anterior hip precautions  DVT PPx: ASA 81 BID, FCDs at all times when not ambulating  Abx: postop Ancef + cefadroxil   Drain: none  Scott: none, POUR + Bethanecol 10mg Q1h x3: UOP 3300 overnight     Dispo: plan to dc to home today once cleared by PT/OT            Mekhi Thompson MD  Orthopedics  [facility])    "

## 2024-09-04 NOTE — PLAN OF CARE
Problem: Adult Inpatient Plan of Care  Goal: Plan of Care Review  Outcome: Adequate for Care Transition  Flowsheets (Taken 9/4/2024 0752)  Plan of Care Reviewed With: patient     Problem: Adult Inpatient Plan of Care  Goal: Patient-Specific Goal (Individualized)  Outcome: Adequate for Care Transition  Flowsheets (Taken 9/4/2024 0752)  Individualized Care Needs: Pain management     Problem: Diabetes Comorbidity  Goal: Blood Glucose Level Within Targeted Range  Intervention: Monitor and Manage Glycemia  Flowsheets (Taken 9/4/2024 0752)  Glycemic Management: blood glucose monitored     Problem: Hip Arthroplasty  Goal: Absence of Bleeding  Intervention: Monitor and Manage Bleeding  Flowsheets (Taken 9/4/2024 0752)  Bleeding Management: dressing monitored     Problem: Hip Arthroplasty  Goal: Nausea and Vomiting Relief  Intervention: Prevent or Manage Nausea and Vomiting  Flowsheets (Taken 9/4/2024 0752)  Nausea/Vomiting Interventions:   nausea triggers minimized   stimuli minimized     Problem: Fall Injury Risk  Goal: Absence of Fall and Fall-Related Injury  Intervention: Identify and Manage Contributors  Flowsheets (Taken 9/4/2024 0752)  Medication Review/Management:   medications reviewed   high-risk medications identified     Problem: Fall Injury Risk  Goal: Absence of Fall and Fall-Related Injury  Intervention: Promote Injury-Free Environment  Flowsheets (Taken 9/4/2024 0752)  Safety Promotion/Fall Prevention:   assistive device/personal item within reach   Fall Risk reviewed with patient/family   family expresses understanding of fall risk and prevention   high risk medications identified   instructed to call staff for mobility   lighting adjusted   medications reviewed   nonskid shoes/socks when out of bed   patient expresses understanding of fall risk and prevention   side rails raised x 2   Supervised toileting - stay within arms reach     Problem: Comorbidity Management  Goal: Blood Pressure in Desired  Range  Intervention: Maintain Blood Pressure Management  Flowsheets (Taken 9/4/2024 9002)  Medication Review/Management:   medications reviewed   high-risk medications identified     Plan of care reviewed with patient, verbalized understanding. Medications reviewed and given as ordered. Rounding for safety and patient care per policy. Safety precautions maintained. Call light within reach, bed wheels locked, bed in lowest position, side rails up x2, patient instructed to call for assistance, DME at bedside.

## 2024-09-04 NOTE — PT/OT/SLP PROGRESS
Physical Therapy Treatment and Discharge    Patient Name:  Jerel Serra   MRN:  [id number]    Recommendations:     Discharge Recommendations: Low Intensity Therapy  Discharge Equipment Recommendations: none (patient stated that he has a RW at home)  Barriers to discharge: None    Assessment:     Jerel Serra is a 58 y.o. male admitted with a medical diagnosis of Primary osteoarthritis of right hip.  He presents with the following impairments/functional limitations: weakness, impaired functional mobility, gait instability, impaired balance, decreased lower extremity function, pain, decreased ROM, orthopedic precautions. Patient tolerated PT session well. Patient ambulated 200ft with RW and supervision. No LOB or SOB noted. Patient ascended/descended 4 steps with bilateral handrails and stand by assistance. Patient performed R LE therex. Patient educated in anterior hip precautions. Patient ready to discharge home from PT standpoint.      Rehab Prognosis: Good; patient would benefit from acute skilled PT services to address these deficits and reach maximum level of function.    Recent Surgery: Procedure(s) (LRB):  ARTHROPLASTY, HIP, TOTAL, ANTERIOR APPROACH: RIGHT: DEPUY - ACTIS + EMPHASYS (Right) 1 Day Post-Op    Plan:     During this hospitalization, patient to be seen daily to address the identified rehab impairments via gait training, therapeutic activities, therapeutic exercises and progress toward the following goals:    Plan of Care Expires:  09/04/24    Subjective     Chief Complaint: Right hip pain.   Patient/Family Comments/goals: To be ready for his wedding.   Pain/Comfort:  Pain Rating 1: 3/10  Location - Side 1: Right  Location 1: hip  Pain Addressed 1: Pre-medicate for activity, Reposition, Distraction      Objective:     Communicated with RN prior to session. Patient found up in chair with cryotherapy upon PT entry to room.     General Precautions: Standard, fall  Orthopedic Precautions: RLE weight  bearing as tolerated, RLE anterior precautions (Anterior total hip, WBAT with walker, no extremes of motion, hip flexion 0-90 degrees.)  Braces: N/A  Respiratory Status: Room air     Functional Mobility:  Mat Mobility:     Supine to Sit: stand by assistance  Sit to Supine: stand by assistance  Transfers:     Sit to Stand:  supervision with rolling walker x1 from bedside chair and x1 from mat   Gait: Patient ambulated 200ft with Rolling Walker and supervision using 3-point gait. Patient demonstrated decreased bolivar and decreased step length during gait due to pain, decreased ROM, and decreased strength.  Stairs:  Patient ascended/descended 4 steps with bilateral handrails and stand by assistance.     AM-PAC 6 CLICK MOBILITY  Turning over in bed (including adjusting bedclothes, sheets and blankets)?: 4  Sitting down on and standing up from a chair with arms (e.g., wheelchair, bedside commode, etc.): 4  Moving from lying on back to sitting on the side of the bed?: 4  Moving to and from a bed to a chair (including a wheelchair)?: 4  Need to walk in hospital room?: 4  Climbing 3-5 steps with a railing?: 3  Basic Mobility Total Score: 23     Treatment & Education:  Patient performed R LE therex x10 reps for heel slides and SAQ over bolster, and x30 reps for A/P, quad set, and glute set all within anterior hip precautions.      Patient educated in:  -PT role and POC  -anterior hip precautions  -safety with transfers including hand placement  -gait sequencing and RW management  -OOB activity to maximize recovery including ambulating at home to prevent DVT   -SUV transfer  -stair training  -HEP for therex at home with handout provided     Patient left up in chair with all lines intact, call button in reach, and RN notified.    GOALS:   Multidisciplinary Problems       Physical Therapy Goals          Problem: Physical Therapy    Goal Priority Disciplines Outcome Goal Variances Interventions   Physical Therapy Goal     PT,  PT/OT Progressing     Description: Goals to be met by: 2024     Patient will increase functional independence with mobility by performin. Supine to sit with Supervision  2. Sit to stand transfer with Supervision  3. Gait  x 200 feet with Supervision using Rolling Walker.   4. Ascend/descend 12 stairs with bilateral Handrails Stand-by Assistance using No Assistive Device.   5. Lower extremity exercise program x 30 reps per handout, with supervision    Patient demonstrates a mobility limitation that significantly impairs their ability to participate in one or more mobility related activities of daily living. Patient's mobility limitation cannot be sufficiently resolved with the use of a cane, but can be sufficiently resolved with the use of a rolling walker.The use of a rolling walker will considerably improve their ability to participate in MRADLs. Patient will use the walker on a regular basis at home.                           Time Tracking:     PT Received On: 24  PT Start Time: 1047     PT Stop Time: 1119  PT Total Time (min): 32 min     Billable Minutes: Gait Training 18 and Therapeutic Exercise 14    Treatment Type: Treatment  PT/PTA: PT     Number of PTA visits since last PT visit: 0     2024

## 2024-09-04 NOTE — SUBJECTIVE & OBJECTIVE
"Principal Problem:Primary osteoarthritis of right hip    Principal Orthopedic Problem: Same    Interval History: MELANY, patient stable, pain controlled. No acute complaints this morning. Walked 140ft with PT yesterday. Dressings CDI. .     Review of patient's allergies indicates:  No Known Allergies    Current Facility-Administered Medications   Medication    0.9%  NaCl infusion    acetaminophen tablet 1,000 mg    allopurinoL tablet 300 mg    amlodipine-benazepril 5-20 mg per capsule 1 capsule    aspirin EC tablet 81 mg    atorvastatin tablet 20 mg    dextrose 10% bolus 125 mL 125 mL    dextrose 10% bolus 125 mL 125 mL    dextrose 10% bolus 250 mL 250 mL    dextrose 10% bolus 250 mL 250 mL    famotidine tablet 20 mg    glucagon (human recombinant) injection 1 mg    glucose chewable tablet 16 g    glucose chewable tablet 24 g    insulin aspart U-100 pen 0-10 Units    methocarbamoL tablet 750 mg    morphine injection 2 mg    mupirocin 2 % ointment 1 g    naloxone 0.4 mg/mL injection 0.02 mg    ondansetron injection 4 mg    oxyCODONE immediate release tablet 5 mg    oxyCODONE immediate release tablet Tab 10 mg    polyethylene glycol packet 17 g    polyethylene glycol packet 17 g    pregabalin capsule 75 mg    prochlorperazine injection Soln 5 mg    senna-docusate 8.6-50 mg per tablet 1 tablet     Objective:     Vital Signs (Most Recent):  Temp: 97.8 °F (36.6 °C) (09/04/24 0431)  Pulse: 72 (09/04/24 0431)  Resp: 18 (09/04/24 0431)  BP: (!) 152/85 (09/04/24 0431)  SpO2: 98 % (09/04/24 0431) Vital Signs (24h Range):  Temp:  [96.2 °F (35.7 °C)-98.2 °F (36.8 °C)] 97.8 °F (36.6 °C)  Pulse:  [59-87] 72  Resp:  [10-20] 18  SpO2:  [96 %-100 %] 98 %  BP: ()/(53-99) 152/85     Weight: 102.1 kg (225 lb)  Height: 5' 11" (180.3 cm)  Body mass index is 31.38 kg/m².      Intake/Output Summary (Last 24 hours) at 9/4/2024 0708  Last data filed at 9/4/2024 0600  Gross per 24 hour   Intake 4281.93 ml   Output 3300 ml   Net " 981.93 ml        Ortho/SPM Exam  AAOx4  NAD  Reg rate  No increased WOB    RLE:  Dressing c/d/i  SILT T/SP/DP/Walker/Sa  Motor intact T/SP/DP  WWP extremities  FCDs in place and functioning       Significant Labs:   Recent Lab Results  (Last 5 results in the past 24 hours)        09/04/24  0634   09/04/24  0432   09/03/24  2123   09/03/24  1701   09/03/24  0910        POC BUN   14             POC Chloride   104             POC Creatinine   0.9             POC Glucose   170             POC Hematocrit   40             POC Ionized Calcium   1.15             POC Potassium   4.1             POC Sodium   140             POC TCO2 (MEASURED)   24             POCT Glucose 163     259   241   186       Sample   VENOUS                                  All pertinent labs within the past 24 hours have been reviewed.    Significant Imaging: I have reviewed and interpreted all pertinent imaging results/findings.

## 2024-09-05 PROCEDURE — G0180 MD CERTIFICATION HHA PATIENT: HCPCS | Mod: ,,, | Performed by: ORTHOPAEDIC SURGERY

## 2024-09-06 ENCOUNTER — OFFICE VISIT (OUTPATIENT)
Dept: ORTHOPEDICS | Facility: CLINIC | Age: 59
End: 2024-09-06
Payer: COMMERCIAL

## 2024-09-06 DIAGNOSIS — Z96.641 S/P HIP REPLACEMENT, RIGHT: Primary | ICD-10-CM

## 2024-09-10 NOTE — PLAN OF CARE
[facility]  Discharge Final Note    Primary Care Provider: Wiley Piedra MD    Expected Discharge Date: 9/4/2024    Final Discharge Note (most recent)       Final Note - 09/10/24 1212          Final Note    Assessment Type Final Discharge Note     Anticipated Discharge Disposition [facility]-Health Care [facility]     What phone number can be called within the next 1-3 days to see how you are doing after discharge? --   [phone]       Post-Acute Status    Post-Acute Authorization Home Health     Home Health Status Set-up Complete/Auth obtained                     Important Message from Medicare           Future Appointments   Date Time Provider Department Center   9/17/2024 10:20 AM Elisabet Chavez NP [facility] ORTHO [address]   10/2/2024 11:45 AM [facility] HEPATOLOGY, [facility] HEPAT [facility]   10/2/2024  1:00 PM Huyen Gant NP [facility] HEPAT [address] [facility]   10/15/2024 10:30 AM [facility]   10/15/2024 11:00 AM Roly Adames III, MD [facility] [address]   11/26/2024 11:00 AM Roly Adames III, MD [facility] [address]     Patient discharged home to care of family and [facility] on 9/4/24.    Winter Morgan RNCM  Case Management  [facility]  [phone]

## 2024-09-17 ENCOUNTER — OFFICE VISIT (OUTPATIENT)
Dept: ORTHOPEDICS | Facility: CLINIC | Age: 59
End: 2024-09-17
Payer: COMMERCIAL

## 2024-09-17 ENCOUNTER — TELEPHONE (OUTPATIENT)
Dept: ORTHOPEDICS | Facility: CLINIC | Age: 59
End: 2024-09-17

## 2024-09-17 VITALS — WEIGHT: 236.13 LBS | BODY MASS INDEX: 33.06 KG/M2 | HEIGHT: 71 IN

## 2024-09-17 DIAGNOSIS — Z96.641 S/P HIP REPLACEMENT, RIGHT: Primary | ICD-10-CM

## 2024-09-17 PROCEDURE — 99999 PR PBB SHADOW E&M-EST. PATIENT-LVL III: CPT | Mod: PBBFAC,,, | Performed by: NURSE PRACTITIONER

## 2024-09-17 PROCEDURE — 4010F ACE/ARB THERAPY RXD/TAKEN: CPT | Mod: CPTII,S$GLB,, | Performed by: NURSE PRACTITIONER

## 2024-09-17 PROCEDURE — 3044F HG A1C LEVEL LT 7.0%: CPT | Mod: CPTII,S$GLB,, | Performed by: NURSE PRACTITIONER

## 2024-09-17 PROCEDURE — 1160F RVW MEDS BY RX/DR IN RCRD: CPT | Mod: CPTII,S$GLB,, | Performed by: NURSE PRACTITIONER

## 2024-09-17 PROCEDURE — 99024 POSTOP FOLLOW-UP VISIT: CPT | Mod: S$GLB,,, | Performed by: NURSE PRACTITIONER

## 2024-09-17 PROCEDURE — 1159F MED LIST DOCD IN RCRD: CPT | Mod: CPTII,S$GLB,, | Performed by: NURSE PRACTITIONER

## 2024-09-17 NOTE — PROGRESS NOTES
"Jerel Serra presents for initial post-operative visit following a right total hip arthroplasty performed by Dr. Adames on 9/3/2024.       Exam:  Height 5' 11" (1.803 m), weight 107.1 kg (236 lb 1.8 oz).   Patient is ambulating well with cane   Incision is clean and dry without drainage or erythema.      Initial post-operative radiographs reviewed today revealing satisfactory position of the prosthesis.    A/P  2 weeks s/p right total hip replacement    - Patient advised to keep the incision clean and dry for the next 24 hours after which he  may wash the area with antibacterial soap in the shower, but will not submerge incision until one month post op.  - Continue aspirin for 1 month post op  - Pain medication: pt is not taking oxycodone or robaxin- only tylenol  - Follow up in 4 weeks with surgeon. Pt will call clinic immediately with problems/concerns.      "

## 2024-09-17 NOTE — TELEPHONE ENCOUNTER
Called patient and confirmed he needs to keep those appointments.   ----- Message from Alma Crowder sent at 9/17/2024  2:10 PM CDT -----  Name of Who is Calling: DELIA MERA [[id number]]      What is the request in detail: pt is requesting to see if both post op appts are needed in the future with this office and Dr Adames. Please advise       Can the clinic reply by [facility]: No       What Number to Call Back if not in [facility]: Telephone Information:         [phone]

## 2024-09-27 ENCOUNTER — TELEPHONE (OUTPATIENT)
Dept: ORTHOPEDICS | Facility: CLINIC | Age: 59
End: 2024-09-27
Payer: COMMERCIAL

## 2024-09-27 NOTE — TELEPHONE ENCOUNTER
Called and spoke to patient. He wants to return to work 10/15. Will get the letter for him at his post op appt.   Problem: Patient Care Overview  Goal: Plan of Care Review  Outcome: Ongoing (interventions implemented as appropriate)  Patient in room with daughter at bedside. Patient took a stroll in the wheelchair using neutropenic precautions to the 1st floor atrium. Reports slight anxiety, denies any pain, patient has been independent in her ADLs. Up to the [facility]. Voiding. Tolerating diet about 30% today. Reports slight nausea which is relieved by the scheduled medication. Afebrile, no falls this shift. Bruising noted no skin breakdown. Patient remains on neutropenic and thrombocytopenic precautions. Will cont to monitor.

## 2024-09-27 NOTE — TELEPHONE ENCOUNTER
----- Message from Roly Adames III, MD sent at 9/27/2024  3:58 PM CDT -----  What do you think?   I'm ok if he's ok but perhaps try to restrict his lifting to 25lbs and no ladders?  ----- Message -----  From: Elisabet Chavez NP  Sent: 9/27/2024   3:11 PM CDT  To: Roly Adames III, MD    Are you ok with him returning to work?  ----- Message -----  From: Anuja Londono MA  Sent: 9/27/2024   9:49 AM CDT  To: Elisabet Chavez NP      ----- Message -----  From: Nano Kumar  Sent: 9/27/2024   9:23 AM CDT  To: Scott Bhandari Staff         Nature of Call: requesting a earlier timeline for returning back to work    Best Call Back Number: [phone]     Additional Information:  Jerel Serra calling regarding Patient Advice. PT is wanting to inform that he is doing good and wanted to see if he can return back to work sooner than what was expected, and if so he wanted to see about coming in sooner than the 10/15/24 appt to be evaluated for clearance, call back to inform

## 2024-09-27 NOTE — TELEPHONE ENCOUNTER
----- Message from Nano Kumar sent at 9/27/2024  7:45 AM CDT -----       Nature of Call: requesting a earlier timeline for returning back to work    Best Call Back Number: [phone]     Additional Information:  Jerel Serra calling regarding Patient Advice. PT is wanting to inform that he is doing good and wanted to see if he can return back to work sooner than what was expected, and if so he wanted to see about coming in sooner than the 10/15/24 appt to be evaluated for clearance, call back to inform

## 2024-09-27 NOTE — TELEPHONE ENCOUNTER
Called pt and told him that Elisabet asked Dr. Adames about this and we will call him back when we get Dr. Adames's response. Pt said the 15th is fine he would just prefer to get back to work earlier.   ----- Message from Nano Kumar sent at 9/27/2024  7:45 AM CDT -----       Nature of Call: requesting a earlier timeline for returning back to work    Best Call Back Number: [phone]     Additional Information:  Jerel Serra calling regarding Patient Advice. PT is wanting to inform that he is doing good and wanted to see if he can return back to work sooner than what was expected, and if so he wanted to see about coming in sooner than the 10/15/24 appt to be evaluated for clearance, call back to inform

## 2024-10-01 ENCOUNTER — HOSPITAL ENCOUNTER (EMERGENCY)
Facility: HOSPITAL | Age: 59
Discharge: HOME | End: 2024-10-01
Attending: EMERGENCY MEDICINE
Payer: COMMERCIAL

## 2024-10-01 ENCOUNTER — EXTERNAL HOME HEALTH (OUTPATIENT)
Dept: HOME HEALTH SERVICES | Facility: HOSPITAL | Age: 59
End: 2024-10-01
Payer: COMMERCIAL

## 2024-10-01 VITALS
BODY MASS INDEX: 32.34 KG/M2 | TEMPERATURE: 98 F | HEIGHT: 71 IN | WEIGHT: 231 LBS | HEART RATE: 80 BPM | DIASTOLIC BLOOD PRESSURE: 99 MMHG | RESPIRATION RATE: 16 BRPM | SYSTOLIC BLOOD PRESSURE: 150 MMHG | OXYGEN SATURATION: 99 %

## 2024-10-01 DIAGNOSIS — R07.9 CHEST PAIN: Primary | ICD-10-CM

## 2024-10-01 LAB
ALBUMIN SERPL BCP-MCNC: 4.3 G/DL (ref 3.5–5.2)
ALP SERPL-CCNC: 81 U/L (ref 55–135)
ALT SERPL W/O P-5'-P-CCNC: 23 U/L (ref 10–44)
ANION GAP SERPL CALC-SCNC: 13 MMOL/L (ref 8–16)
AST SERPL-CCNC: 32 U/L (ref 10–40)
BASOPHILS # BLD AUTO: 0.06 K/UL (ref 0–0.2)
BASOPHILS NFR BLD: 1.2 % (ref 0–1.9)
BILIRUB SERPL-MCNC: 0.4 MG/DL (ref 0.1–1)
BNP SERPL-MCNC: 39 PG/ML (ref 0–99)
BUN SERPL-MCNC: 17 MG/DL (ref 6–20)
CALCIUM SERPL-MCNC: 9.8 MG/DL (ref 8.7–10.5)
CHLORIDE SERPL-SCNC: 103 MMOL/L (ref 95–110)
CO2 SERPL-SCNC: 24 MMOL/L (ref 23–29)
CREAT SERPL-MCNC: 0.9 MG/DL (ref 0.5–1.4)
D DIMER PPP IA.FEU-MCNC: 1.99 MG/L FEU
DIFFERENTIAL METHOD BLD: ABNORMAL
EOSINOPHIL # BLD AUTO: 0.1 K/UL (ref 0–0.5)
EOSINOPHIL NFR BLD: 2.5 % (ref 0–8)
ERYTHROCYTE [DISTWIDTH] IN BLOOD BY AUTOMATED COUNT: 13.3 % (ref 11.5–14.5)
EST. GFR  (NO RACE VARIABLE): >60 ML/MIN/1.73 M^2
GLUCOSE SERPL-MCNC: 178 MG/DL (ref 70–110)
HCT VFR BLD AUTO: 43.4 % (ref 40–54)
HCV AB SERPL QL IA: NORMAL
HGB BLD-MCNC: 14.3 G/DL (ref 14–18)
IMM GRANULOCYTES # BLD AUTO: 0.01 K/UL (ref 0–0.04)
IMM GRANULOCYTES NFR BLD AUTO: 0.2 % (ref 0–0.5)
LYMPHOCYTES # BLD AUTO: 2.8 K/UL (ref 1–4.8)
LYMPHOCYTES NFR BLD: 54.2 % (ref 18–48)
MCH RBC QN AUTO: 29.7 PG (ref 27–31)
MCHC RBC AUTO-ENTMCNC: 32.9 G/DL (ref 32–36)
MCV RBC AUTO: 90 FL (ref 82–98)
MONOCYTES # BLD AUTO: 0.4 K/UL (ref 0.3–1)
MONOCYTES NFR BLD: 7.1 % (ref 4–15)
NEUTROPHILS # BLD AUTO: 1.8 K/UL (ref 1.8–7.7)
NEUTROPHILS NFR BLD: 34.8 % (ref 38–73)
NRBC BLD-RTO: 0 /100 WBC
OHS QRS DURATION: 80 MS
OHS QRS DURATION: 82 MS
OHS QTC CALCULATION: 470 MS
OHS QTC CALCULATION: 475 MS
PLATELET # BLD AUTO: 214 K/UL (ref 150–450)
PMV BLD AUTO: 11 FL (ref 9.2–12.9)
POTASSIUM SERPL-SCNC: 4.2 MMOL/L (ref 3.5–5.1)
PROT SERPL-MCNC: 8.3 G/DL (ref 6–8.4)
RBC # BLD AUTO: 4.81 M/UL (ref 4.6–6.2)
SODIUM SERPL-SCNC: 140 MMOL/L (ref 136–145)
TROPONIN I SERPL DL<=0.01 NG/ML-MCNC: 0.01 NG/ML (ref 0–0.03)
TROPONIN I SERPL DL<=0.01 NG/ML-MCNC: <0.006 NG/ML (ref 0–0.03)
WBC # BLD AUTO: 5.2 K/UL (ref 3.9–12.7)

## 2024-10-01 PROCEDURE — 86803 HEPATITIS C AB TEST: CPT | Performed by: PHYSICIAN ASSISTANT

## 2024-10-01 PROCEDURE — 80053 COMPREHEN METABOLIC PANEL: CPT | Performed by: EMERGENCY MEDICINE

## 2024-10-01 PROCEDURE — 84484 ASSAY OF TROPONIN QUANT: CPT | Performed by: EMERGENCY MEDICINE

## 2024-10-01 PROCEDURE — 99285 EMERGENCY DEPT VISIT HI MDM: CPT | Mod: 25

## 2024-10-01 PROCEDURE — 93010 ELECTROCARDIOGRAM REPORT: CPT | Mod: 76,,, | Performed by: INTERNAL MEDICINE

## 2024-10-01 PROCEDURE — 85379 FIBRIN DEGRADATION QUANT: CPT | Performed by: EMERGENCY MEDICINE

## 2024-10-01 PROCEDURE — 93005 ELECTROCARDIOGRAM TRACING: CPT

## 2024-10-01 PROCEDURE — 25000003 PHARM REV CODE 250: Performed by: EMERGENCY MEDICINE

## 2024-10-01 PROCEDURE — 85025 COMPLETE CBC W/AUTO DIFF WBC: CPT | Performed by: EMERGENCY MEDICINE

## 2024-10-01 PROCEDURE — 83880 ASSAY OF NATRIURETIC PEPTIDE: CPT | Performed by: EMERGENCY MEDICINE

## 2024-10-01 PROCEDURE — 25500020 PHARM REV CODE 255: Performed by: EMERGENCY MEDICINE

## 2024-10-01 RX ORDER — AMLODIPINE AND BENAZEPRIL HYDROCHLORIDE 5; 20 MG/1; MG/1
1 CAPSULE ORAL
Status: COMPLETED | OUTPATIENT
Start: 2024-10-01 | End: 2024-10-01

## 2024-10-01 RX ADMIN — IOHEXOL 100 ML: 350 INJECTION, SOLUTION INTRAVENOUS at 10:10

## 2024-10-01 RX ADMIN — AMLODIPINE BESYLATE AND BENAZEPRIL HYDROCHLORIDE 1 CAPSULE: 5; 20 CAPSULE ORAL at 05:10

## 2024-10-01 NOTE — ED NOTES
Patient identifiers for Jerel Serra 58 y.o. male checked and correct.  Chief Complaint   Patient presents with    Chest Pain     Non-radiating CP [id number] ago. +headache      Past Medical History:   Diagnosis Date    Arthritis     Diabetes mellitus     Diabetes mellitus, type 2     Gout     Gout attack     Hepatitis B core antibody positive 03/23/2022    Negative sAg, suggests previous exposure but no chronic/active Hep B. At risk for reactivation with any immunosuppression medication, steroids, chemo, etc.          Hepatitis B surface antigen positive 03/23/2022    Hyperlipidemia     Hypertension     Renal insufficiency 08/14/2024    Rhabdomyolysis 04/06/2013     Allergies reported: Review of patient's allergies indicates:  No Known Allergies      HEENT: Denies vision changes. Denies ear drainage or hearing loss. No c/o nasal drainage. Denies dysphagia or voice changes.   Appearance: Pt awake, alert & oriented to person, place & time. Pt in no acute distress at present time. Pt is clean and well groomed with clothes appropriately fastened.   Skin: Skin warm, dry & intact. Color consistent with ethnicity. Mucous membranes moist. No breakdown or brusing noted.   Musculoskeletal: Patient moving all extremities well, no obvious swelling or deformities noted.   Respiratory: Respirations spontaneous, even, and non-labored. Visible chest rise noted. Airway is open and patent. No accessory muscle use noted.   Neurologic: Sensation is intact. Speech is clear and appropriate. Eyes open spontaneously, behavior appropriate to situation, follows commands, facial expression symmetrical, bilateral hand grasp equal and even, purposeful motor response noted.  Cardiac: All peripheral pulses present. No Bilateral lower extremity edema. Cap refill is <3 seconds. Pt denies any current chest pain.    Abdomen: Abdomen soft, non distended, non tender to palpation.   : Pt voids independently, denies dysuria, hematuria, frequency.

## 2024-10-01 NOTE — ED NOTES
Jerel Serra, a 58 y.o. male presents to the ED w/ complaint of left sided chest pain that started 20 minutes ago, states that he got into a argument with his wife and that caused him to have chest pain, currently patient is not complaining of chest pain, denies SOB or any cardiac hx.     Triage note:  Chief Complaint   Patient presents with    Chest Pain     Non-radiating CP [id number] ago. +headache      Review of patient's allergies indicates:  No Known Allergies  Past Medical History:   Diagnosis Date    Arthritis     Diabetes mellitus     Diabetes mellitus, type 2     Gout     Gout attack     Hepatitis B core antibody positive 03/23/2022    Negative sAg, suggests previous exposure but no chronic/active Hep B. At risk for reactivation with any immunosuppression medication, steroids, chemo, etc.          Hepatitis B surface antigen positive 03/23/2022    Hyperlipidemia     Hypertension     Renal insufficiency 08/14/2024    Rhabdomyolysis 04/06/2013       APPEARANCE: awake and alert in NAD.  SKIN: warm, dry and intact. No breakdown or bruising.  MUSCULOSKELETAL: Patient moving all extremities spontaneously, no obvious swelling or deformities noted. Ambulates independently.  RESPIRATORY: Denies shortness of breath.Respirations unlabored.   CARDIAC: Denies CP, 2+ distal pulses; no peripheral edema  ABDOMEN: S/ND/NT, Denies nausea  : voids spontaneously, denies difficulty  Neurologic: AAO x 4; follows commands equal strength in all extremities; denies numbness/tingling. Denies dizziness, MAURICE, pupils 3mm

## 2024-10-01 NOTE — ED PROVIDER NOTES
"Encounter Date: 10/1/2024       History     Chief Complaint   Patient presents with    Chest Pain     Non-radiating CP [id number] ago. +headache      59 yo M with pmhx DM, HTN, gout, CKD, 3 weeks status post hip replacement presents with a chief complaint of chest pain.  Patient accidentally called his fiancee his ex-wife name and became anxious.  He noted pain described as "tightness" in his left chest.  He noted pain last seconds to 1 minute in length.  No shortness of breath, nausea, vomiting, diaphoresis.  He notes a history of similar episodes that occurred 2 distress.  He notes that he was 3 weeks status post hip replacement.  Chest pain-free currently and feeling well.        Review of patient's allergies indicates:  No Known Allergies  Past Medical History:   Diagnosis Date    Arthritis     Diabetes mellitus     Diabetes mellitus, type 2     Gout     Gout attack     Hepatitis B core antibody positive 03/23/2022    Negative sAg, suggests previous exposure but no chronic/active Hep B. At risk for reactivation with any immunosuppression medication, steroids, chemo, etc.          Hepatitis B surface antigen positive 03/23/2022    Hyperlipidemia     Hypertension     Renal insufficiency 08/14/2024    Rhabdomyolysis 04/06/2013     Past Surgical History:   Procedure Laterality Date    ARTHROPLASTY OF HIP BY ANTERIOR APPROACH Right 9/3/2024    Procedure: ARTHROPLASTY, HIP, TOTAL, ANTERIOR APPROACH: RIGHT: DEPUY - ACTIS + EMPHASYS;  Surgeon: Roly Adames III, MD;  Location: [facility];  Service: Orthopedics;  Laterality: Right;    HIP SURGERY Left 2020     Family History   Problem Relation Name Age of Onset    COPD Mother      Hypertension Mother      Ovarian cancer Mother      Hypertension Father      Diabetes Father       Social History     Tobacco Use    Smoking status: Never    Smokeless tobacco: Never   Substance Use Topics    Alcohol use: Yes     Comment: occasional    Drug use: No     Review of " Systems    Physical Exam     Initial Vitals [10/01/24 0355]   BP Pulse Resp Temp SpO2   (!) 182/93 89 18 98.1 °F (36.7 °C) 100 %      MAP       --         Physical Exam    Nursing note and vitals reviewed.  Constitutional: He appears well-developed and well-nourished. He is not diaphoretic. No distress.   HENT:   Head: Normocephalic and atraumatic.   Eyes: Right eye exhibits no discharge. Left eye exhibits no discharge. No scleral icterus.   Neck: Neck supple. No JVD present.   Normal range of motion.  Cardiovascular:  Normal rate, regular rhythm and normal heart sounds.     Exam reveals no gallop and no friction rub.       No murmur heard.  Pulmonary/Chest: Breath sounds normal. No respiratory distress. He has no wheezes. He has no rhonchi. He has no rales.   Abdominal: Abdomen is soft. He exhibits no distension and no mass. There is no abdominal tenderness. There is no rebound and no guarding.   Musculoskeletal:         General: No tenderness. Normal range of motion.      Cervical back: Normal range of motion and neck supple.      Comments: No calf asymmetry, cords tenderness     Neurological: He is alert and oriented to person, place, and time. He has normal strength. No sensory deficit.   Skin: Skin is warm and dry. Capillary refill takes less than 2 seconds.   Psychiatric: Thought content normal.         ED Course   Procedures  Labs Reviewed   CBC W/ AUTO DIFFERENTIAL - Abnormal       Result Value    WBC 5.20      RBC 4.81      Hemoglobin 14.3      Hematocrit 43.4      MCV 90      MCH 29.7      MCHC 32.9      RDW 13.3      Platelets 214      MPV 11.0      Immature Granulocytes 0.2      Gran # (ANC) 1.8      Immature Grans (Abs) 0.01      Lymph # 2.8      Mono # 0.4      Eos # 0.1      Baso # 0.06      nRBC 0      Gran % 34.8 (*)     Lymph % 54.2 (*)     Mono % 7.1      Eosinophil % 2.5      Basophil % 1.2      Differential Method Automated     COMPREHENSIVE METABOLIC PANEL - Abnormal    Sodium 140       Potassium 4.2      Chloride 103      CO2 24      Glucose 178 (*)     BUN 17      Creatinine 0.9      Calcium 9.8      Total Protein 8.3      Albumin 4.3      Total Bilirubin 0.4      Alkaline Phosphatase 81      AST 32      ALT 23      eGFR >60.0      Anion Gap 13     HEPATITIS C ANTIBODY    Hepatitis C Ab Non-reactive      Narrative:     Release to patient->Immediate   TROPONIN I    Troponin I 0.011     B-TYPE NATRIURETIC PEPTIDE    BNP 39     D DIMER, QUANTITATIVE     EKG Readings: (Independently Interpreted)   Initial Reading: No STEMI. Rhythm: Normal Sinus Rhythm. Heart Rate: 87. ST Segment Elevation: V2. T Waves: Normal. Axis: Normal.       Imaging Results              X-Ray Chest AP Portable (Final result)  Result time 10/01/24 06:31:33      Final result by Lebron Cole MD (10/01/24 06:31:33)                   Impression:      No acute cardiopulmonary process.    Electronically signed by resident: Ranjit Nowak  Date:    10/01/2024  Time:    06:23    Electronically signed by: Lebron Cole  Date:    10/01/2024  Time:    06:31               Narrative:    EXAMINATION:  XR CHEST AP PORTABLE    CLINICAL HISTORY:  Chest pain, unspecified    TECHNIQUE:  Single frontal view of the chest was performed.    COMPARISON:  08/24/2020    FINDINGS:  The cardiomediastinal silhouette is normal in size and contour.  Bilateral lung fields are clear of focal consolidation.  No pleural effusions.  No pneumothorax.  No acute abnormality visualized soft tissues or osseous structures.                                       Medications   amlodipine-benazepril 5-20 mg per capsule 1 capsule (1 capsule Oral Given 10/1/24 0540)     Medical Decision Making  57 yo M with pmhx DM, HTN, gout, CKD, 3 weeks status post hip replacement presents with a chief complaint of chest pain.    Differential includes, but is not limited to: ACS, hypertensive emergency, PE, stress reaction    Patient was initially quite hypertensive at triage but are not  yet taken his home blood pressure medications.  Will order.  His blood pressure was quite improved when I evaluated him in the room.  Will obtain 2 troponin rule out including D-dimer given recent surgery.  Chest x-ray.  Will monitor on telemetry.    Reassessment: CBC without leukocytosis or anemia.  CMP normal.  Troponin normal.  BNP normal.  Chest x-ray without acute process.  On repeat assessment, he remains resting comfortably on room air.  He denies any pain.  At this time, my shift is coming to a close and the patient was signed out to incoming MD. Patient awaiting D-dimer, serial troponin with timed EKG, and monitoring.  Patient has a HEART score of 3, and is comfortable with outpatient follow up.    Amount and/or Complexity of Data Reviewed  Labs: ordered.  Radiology: ordered.    Risk  Prescription drug management.      Additional MDM:   Heart Score:    History:          Slightly suspicious.  ECG:             Nonspecific repolarisation disturbance  Age:               45-65 years  Risk factors: 1-2 risk factors  Troponin:       Less than or equal to normal limit  Heart Score = 3                                       Clinical Impression:  Final diagnoses:  [R07.9] Chest pain (Primary)                 Frederick Hong MD  10/01/24 0655

## 2024-10-01 NOTE — ED NOTES
Received care handoff from YOKASTA Sanchez.  Pt resting comfortably, NAD. Pt updated on plan of care. Pain currently 0/10. Comfort, positioning, and restroom needs addressed. Bed locked in lowest position, side rails up x2, call button within reach.

## 2024-10-01 NOTE — PROVIDER PROGRESS NOTES - EMERGENCY DEPT.
Encounter Date: 10/1/2024    ED Physician Progress Notes          Patient signed out to me at 7:00 a.m. pending D-dimer, delta troponin and repeat EKG.  In summary patient is a 58-year-old male past medical history diabetes hypertension gout CKD and now 3 weeks status post hip replacement coming with chest pain.  Chest pain has resolved for signed out and lasted only briefly.  Denies any shortness of breath. Initial EKG had some ST elevation in V2 but no reciprocal changes.  Initial troponin was 0.011.  Plan for repeat troponin EKG and D-dimer.    D-dimer was elevated at 1.99.  Therefore CTA PE ordered.  CTA PE was negative for pulmonary embolism and all results discussed with the patient as above.  Troponin was negative x2.  EKG did show some ST elevations in V1 and V2 with no reciprocal changes.  Similar to prior.  Discussed with cardiology fellow on-call Dr. Boone who also reviewed EKG and agrees no STEMI or concerning changes.  She  agrees with plan discharge.   Patient requesting discharge which I feel is reasonable.  Recommended follow up closely cardiology for further outpatient workup and management.

## 2024-10-09 ENCOUNTER — PATIENT MESSAGE (OUTPATIENT)
Dept: ORTHOPEDICS | Facility: CLINIC | Age: 59
End: 2024-10-09
Payer: COMMERCIAL

## 2024-10-09 ENCOUNTER — TELEPHONE (OUTPATIENT)
Dept: ORTHOPEDICS | Facility: CLINIC | Age: 59
End: 2024-10-09
Payer: COMMERCIAL

## 2024-10-09 NOTE — TELEPHONE ENCOUNTER
I called the patient today regarding  the message below. I left a message for the patient to call me back. I left my name and phone number. Also sent a portal message.     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 Adames's email: His 6 week post op appointment with elisabet villegas is 10/15/24  Return to work date and any needed restrictions should be discussed and documented at that time  Thank you      Can you please call and let him know that he can discuss this with Elisabet next week?    Sincerely,   Candelario Kim MS, OTC  OR & Clinical Assistant to Dr. Roly Adames III  Phone: [phone]  Fax: [phone]  ----- Message -----  From: Saulo Baires  Sent: 10/8/2024  10:23 AM CDT  To: [facility] Staff  Subject: PT'S CALLING REGARDING HIS RETURN TO WORK PA#    Pt is stating that it was faxed on 10/1.. Pt is needing paperwork signed filled out and sent back.. Pt plans on returning to work on 10/17      Confirmed contact info below:  Contact Name: Jerel Serra  Phone Number: [phone]

## 2024-10-10 DIAGNOSIS — Z96.641 S/P HIP REPLACEMENT, RIGHT: Primary | ICD-10-CM

## 2024-10-15 ENCOUNTER — HOSPITAL ENCOUNTER (OUTPATIENT)
Dept: RADIOLOGY | Facility: HOSPITAL | Age: 59
Discharge: HOME | End: 2024-10-15
Attending: NURSE PRACTITIONER
Payer: COMMERCIAL

## 2024-10-15 ENCOUNTER — OFFICE VISIT (OUTPATIENT)
Dept: ORTHOPEDICS | Facility: CLINIC | Age: 59
End: 2024-10-15
Payer: COMMERCIAL

## 2024-10-15 DIAGNOSIS — Z96.641 S/P HIP REPLACEMENT, RIGHT: Primary | ICD-10-CM

## 2024-10-15 DIAGNOSIS — Z96.641 S/P HIP REPLACEMENT, RIGHT: ICD-10-CM

## 2024-10-15 PROCEDURE — 99999 PR PBB SHADOW E&M-EST. PATIENT-LVL III: CPT | Mod: PBBFAC,,, | Performed by: NURSE PRACTITIONER

## 2024-10-15 PROCEDURE — 73502 X-RAY EXAM HIP UNI 2-3 VIEWS: CPT | Mod: TC,RT

## 2024-10-15 PROCEDURE — 73502 X-RAY EXAM HIP UNI 2-3 VIEWS: CPT | Mod: 26,RT,, | Performed by: RADIOLOGY

## 2024-10-15 PROCEDURE — 99024 POSTOP FOLLOW-UP VISIT: CPT | Mod: S$GLB,,, | Performed by: NURSE PRACTITIONER

## 2024-10-15 PROCEDURE — 3044F HG A1C LEVEL LT 7.0%: CPT | Mod: CPTII,S$GLB,, | Performed by: NURSE PRACTITIONER

## 2024-10-15 PROCEDURE — 1159F MED LIST DOCD IN RCRD: CPT | Mod: CPTII,S$GLB,, | Performed by: NURSE PRACTITIONER

## 2024-10-15 PROCEDURE — 1160F RVW MEDS BY RX/DR IN RCRD: CPT | Mod: CPTII,S$GLB,, | Performed by: NURSE PRACTITIONER

## 2024-10-15 PROCEDURE — 4010F ACE/ARB THERAPY RXD/TAKEN: CPT | Mod: CPTII,S$GLB,, | Performed by: NURSE PRACTITIONER

## 2024-10-15 NOTE — PROGRESS NOTES
Jerel Serra presents for 6 weeks post-operative visit following a right total hip arthroplasty performed by Dr. Adames on 9/3/2024.      Exam:    Patient is ambulating well without assistive device   Incision is healed.      Post-operative radiographs reviewed today revealing satisfactory position of the prosthesis.    A/P  6 weeks s/p right total hip replacement  - Phase II exercises given to patient  - letter to patient's employer releasing him to return to work 10/17  - Follow up in 6 weeks with surgeon. Pt will call clinic immediately with problems/concerns.

## 2024-10-30 PROBLEM — I25.84 CORONARY ARTERY DISEASE DUE TO CALCIFIED CORONARY LESION: Status: ACTIVE | Noted: 2024-10-30

## 2024-10-30 PROBLEM — I70.0 AORTIC ATHEROSCLEROSIS: Status: ACTIVE | Noted: 2024-10-30

## 2024-10-30 PROBLEM — I25.10 CORONARY ARTERY DISEASE DUE TO CALCIFIED CORONARY LESION: Status: ACTIVE | Noted: 2024-10-30

## 2024-10-30 PROBLEM — R07.89 OTHER CHEST PAIN: Status: ACTIVE | Noted: 2024-10-30

## 2024-11-07 ENCOUNTER — TELEPHONE (OUTPATIENT)
Dept: CARDIOLOGY | Facility: CLINIC | Age: 59
End: 2024-11-07
Payer: COMMERCIAL

## 2024-11-07 DIAGNOSIS — R07.9 CHEST PAIN, UNSPECIFIED TYPE: Primary | ICD-10-CM

## 2024-11-26 ENCOUNTER — TELEPHONE (OUTPATIENT)
Dept: ORTHOPEDICS | Facility: CLINIC | Age: 59
End: 2024-11-26
Payer: COMMERCIAL

## 2024-11-26 NOTE — TELEPHONE ENCOUNTER
Spoke to pt, he stated he his phone has been broke and was not able to get updates so he forgot about his appt and need to reschedule his appt. So I was able to reschedule him with Júnior on Friday, November 29, 2024.    Sincerely,  Rachel Fox, [facility]   Certified Clinical Medical Assistant to Dr. Roly andrade  Phone: [phone]  Fax: [phone]        ----- Message from Tayo Barron sent at 11/26/2024  3:08 PM CST -----  Regarding: missed call  Contact: pt  [phone]  Type:  Patient Returning Call    Who Called: Jerel   Who Left Message for Patient some one from dr bell's office   Does the patient know what this is regarding?: patient is not sure   Would the patient rather a call back or a response via [facility]?  Call back   Best Call Back Number:pt  [phone]  Additional Information:     .

## 2024-11-29 ENCOUNTER — HOSPITAL ENCOUNTER (OUTPATIENT)
Dept: RADIOLOGY | Facility: HOSPITAL | Age: 59
Discharge: HOME | End: 2024-11-29
Payer: COMMERCIAL

## 2024-11-29 ENCOUNTER — OFFICE VISIT (OUTPATIENT)
Dept: ORTHOPEDICS | Facility: CLINIC | Age: 59
End: 2024-11-29
Payer: COMMERCIAL

## 2024-11-29 VITALS — BODY MASS INDEX: 32.35 KG/M2 | WEIGHT: 231.06 LBS | HEIGHT: 71 IN

## 2024-11-29 DIAGNOSIS — Z96.641 S/P HIP REPLACEMENT, RIGHT: ICD-10-CM

## 2024-11-29 DIAGNOSIS — Z96.641 S/P HIP REPLACEMENT, RIGHT: Primary | ICD-10-CM

## 2024-11-29 PROCEDURE — 99999 PR PBB SHADOW E&M-EST. PATIENT-LVL III: CPT | Mod: PBBFAC,,,

## 2024-11-29 PROCEDURE — 72170 X-RAY EXAM OF PELVIS: CPT | Mod: TC

## 2024-11-29 PROCEDURE — 72170 X-RAY EXAM OF PELVIS: CPT | Mod: 26,,, | Performed by: RADIOLOGY

## 2024-11-29 NOTE — PROGRESS NOTES
"HPI:  Jerel Serra presents for 12-week post-operative visit following a right total hip arthroplasty performed by Dr. Adames on 9/3/2024. Patient reports an overall satisfactory recovery. He has returned to work without issues.   Medications: none   Assistive Devices: none  PT: Completed home health therapy. Continuing phase II exercises  Limitations: none    Exam:  Height 5' 11" (1.803 m), weight 104.8 kg (231 lb 0.7 oz).   Gait: normal/limp/antalgic/trendelenburg with no assistive device  Incision: healed, clean, and dry without drainage or erythema   Active straight leg raise: good strength, no discomfort   Extension: 0  Flexion: 123  Abduction: 40  Adduction: 30  External rotation: 30  Internal rotation: 20  LLD: 0 cm supine      Imaging:  Radiographs taken today and reviewed revealing satisfactory position of the prosthesis.    A/P:  6 weeks s/p right total hip arthroplasty    - Patient is doing very well with the hip replacement at this time.   - Patient was cleared from hip precautions.he was also given Phase II hip exercises previously.  - Antibiotic prophylaxis reviewed  - Pain medication: none refilled  - Follow up in 3 months with Dr. Adames. Pt will call clinic immediately with problems/concerns.      "

## 2025-02-08 ENCOUNTER — HOSPITAL ENCOUNTER (EMERGENCY)
Facility: HOSPITAL | Age: 60
Discharge: HOME | End: 2025-02-08
Attending: STUDENT IN AN ORGANIZED HEALTH CARE EDUCATION/TRAINING PROGRAM
Payer: COMMERCIAL

## 2025-02-08 VITALS
RESPIRATION RATE: 20 BRPM | OXYGEN SATURATION: 96 % | TEMPERATURE: 98 F | HEIGHT: 72 IN | HEART RATE: 73 BPM | SYSTOLIC BLOOD PRESSURE: 121 MMHG | DIASTOLIC BLOOD PRESSURE: 71 MMHG | BODY MASS INDEX: 31.56 KG/M2 | WEIGHT: 233 LBS

## 2025-02-08 DIAGNOSIS — J02.9 PHARYNGITIS, UNSPECIFIED ETIOLOGY: Primary | ICD-10-CM

## 2025-02-08 LAB — GROUP A STREP, MOLECULAR: NEGATIVE

## 2025-02-08 PROCEDURE — 87651 STREP A DNA AMP PROBE: CPT | Performed by: PHYSICIAN ASSISTANT

## 2025-02-08 PROCEDURE — 99282 EMERGENCY DEPT VISIT SF MDM: CPT

## 2025-02-08 RX ORDER — CETIRIZINE HYDROCHLORIDE 10 MG/1
10 TABLET ORAL DAILY
Qty: 30 TABLET | Refills: 0 | Status: SHIPPED | OUTPATIENT
Start: 2025-02-08 | End: 2026-02-08

## 2025-02-08 NOTE — ED NOTES
Psych: No acute distress is noted. Pt is calm and cooperative, good eye contact.    HEENT: c/o sore throat with some hoarseness. No sweling no redness noted    MUSCULOSKELETAL:  Normal range of motion noted. Moves all extremities well, No swelling, deformity or tenderness noted. FROM    RESPIRATORY: Airway is open and patent, respirations are spontaneous; patient has a normal effort and rate. Pink nailbeds. Clear BBS noted. Denies SOB    GI/ : Soft and non tender to palpation, no distention noted. Denies N/V/D at this time

## 2025-02-08 NOTE — FIRST PROVIDER EVALUATION
Emergency Department TeleTriage Encounter Note      CHIEF COMPLAINT    Chief Complaint   Patient presents with    Sore Throat     C/o sore throat x3 days w/ hoarseness. Took 800 mg ibuprofen this morning w/ mild relief. Denies other sx or complaints.        VITAL SIGNS   Initial Vitals [02/08/25 1011]   BP Pulse Resp Temp SpO2   123/65 70 20 97.6 °F (36.4 °C) 96 %      MAP       --            ALLERGIES    Review of patient's allergies indicates:  No Known Allergies    PROVIDER TRIAGE NOTE  Patient presents with sore throat and hoarseness. No cough. No fever.       ORDERS  Labs Reviewed - No data to display    ED Orders (720h ago, onward)      None              Virtual Visit Note: The provider triage portion of this emergency department evaluation and documentation was performed via [employer], a HIPAA-compliant telemedicine application, in concert with a tele-presenter in the room. A face to face patient evaluation with one of my colleagues will occur once the patient is placed in an emergency department room.      DISCLAIMER: This note was prepared with [employer] voice recognition transcription software. Garbled syntax, mangled pronouns, and other bizarre constructions may be attributed to that software system.

## 2025-02-08 NOTE — ED PROVIDER NOTES
Encounter Date: 2/8/2025       History     Chief Complaint   Patient presents with    Sore Throat     C/o sore throat x3 days w/ hoarseness. Took 800 mg ibuprofen this morning w/ mild relief. Denies other sx or complaints.      59-year-old male presents to ED with concern of hoarse voice and mild sore throat that began 2-3 days ago.  Known exposure to multiple sick contacts.  +mild congestion.  No fevers, chills, body aches, cough, chest pain or shortness of breath, abdominal pain, vomiting or diarrhea.  Tolerating p.o. well.  He has tried OTC medications for symptoms.  No other acute complaints at this time    The history is provided by the patient.     Review of patient's allergies indicates:  No Known Allergies  Past Medical History:   Diagnosis Date    Arthritis     Coronary artery disease due to calcified coronary lesion 10/30/2024    Diabetes mellitus     Diabetes mellitus, type 2     Gout     Gout attack     Hepatitis B core antibody positive 03/23/2022    Negative sAg, suggests previous exposure but no chronic/active Hep B. At risk for reactivation with any immunosuppression medication, steroids, chemo, etc.          Hepatitis B surface antigen positive 03/23/2022    Renal insufficiency 08/14/2024    Rhabdomyolysis 04/06/2013     Past Surgical History:   Procedure Laterality Date    ARTHROPLASTY OF HIP BY ANTERIOR APPROACH Right 9/3/2024    Procedure: ARTHROPLASTY, HIP, TOTAL, ANTERIOR APPROACH: RIGHT: DEPUY - ACTIS + EMPHASYS;  Surgeon: Roly Adames III, MD;  Location: [facility];  Service: Orthopedics;  Laterality: Right;    HIP SURGERY Left 2020     Family History   Problem Relation Name Age of Onset    COPD Mother      Hypertension Mother      Ovarian cancer Mother      Hypertension Father      Diabetes Father       Social History     Tobacco Use    Smoking status: Never    Smokeless tobacco: Never   Substance Use Topics    Alcohol use: Yes     Comment: occasional    Drug use: No     Review of Systems    Constitutional:  Negative for appetite change, chills and fever.   HENT:  Positive for congestion, sore throat and voice change. Negative for ear pain and trouble swallowing.    Respiratory:  Negative for cough and shortness of breath.    Cardiovascular:  Negative for chest pain.   Gastrointestinal:  Negative for abdominal pain, diarrhea, nausea and vomiting.   Musculoskeletal:  Negative for myalgias, neck pain and neck stiffness.       Physical Exam     Initial Vitals [02/08/25 1011]   BP Pulse Resp Temp SpO2   123/65 70 20 97.6 °F (36.4 °C) 96 %      MAP       --         Physical Exam    Vitals reviewed.  Constitutional: Vital signs are normal. He appears well-developed and well-nourished. He is cooperative. He does not have a sickly appearance. He does not appear ill. No distress.   HENT:   Head: Normocephalic and atraumatic.   Voice does sound hoarse but not muffled.  Oropharynx appears clear with no erythema, swelling or evidence of abscess.  Midline uvula.  No stridor or drooling.  No trismus.  Moist mucous membranes   Eyes: EOM are normal.   Neck:   Normal range of motion.  Cardiovascular:  Normal rate.           Pulmonary/Chest: Effort normal.   Musculoskeletal:      Cervical back: Normal range of motion.     Neurological: He is alert and oriented to person, place, and time. GCS eye subscore is 4. GCS verbal subscore is 5. GCS motor subscore is 6.   Psychiatric: He has a normal mood and affect. His speech is normal and behavior is normal.         ED Course   Procedures  Labs Reviewed   GROUP A STREP, MOLECULAR       Result Value    Group A Strep, Molecular Negative            Imaging Results    None          Medications - No data to display  Medical Decision Making  Patient presents with concern of mild sore throat and hoarse voice that began 2-3 days ago.  +sick contacts.  Afebrile with vitals WNL.  Patient overall very well-appearing on exam and in no distress    DDx:  Including but not limited to viral,  pharyngitis, strep, less likely RPA, PTA, Nish's, epiglottitis               ED Course as of 02/08/25 1229   Sat Feb 08, 2025   1142 No significant red flags on today's exam.  Midline uvula.  No swelling.  No stridor or drooling.  No trismus.  No muffled voice.  High suspicion for viral pharyngitis.  Will continue with supportive care.  Prescription written for Zyrtec.  Encouraged home Tylenol/Motrin as needed with close outpatient follow-up.  ED return precautions were discussed at length.  Patient states understanding and agrees with plan [KS]      ED Course User Index  [KS] Reji Sena PA-C                             Clinical Impression:  Final diagnoses:  [J02.9] Pharyngitis, unspecified etiology (Primary)          ED Disposition Condition    Discharge Stable          ED Prescriptions       Medication Sig Dispense Start Date End Date Auth. Provider    cetirizine (ZYRTEC) 10 MG tablet Take 1 tablet (10 mg total) by mouth once daily. 30 tablet 2/8/2025 2/8/2026 Reji Sena PA-C          Follow-up Information       Follow up With Specialties Details Why Contact Info    Wiley Piedra MD Internal Medicine   [address] [place] [id number]  [phone]               Reji Sena PA-C  02/08/25 1228

## 2025-02-08 NOTE — DISCHARGE INSTRUCTIONS

## 2025-03-12 ENCOUNTER — TELEPHONE (OUTPATIENT)
Dept: ORTHOPEDICS | Facility: CLINIC | Age: 60
End: 2025-03-12
Payer: COMMERCIAL

## 2025-03-12 NOTE — TELEPHONE ENCOUNTER
Spoke to and informed pt of his new appointment date and time. Patient stated that-that date and time may not work for him being that he is about to start his new job. He stated instead of wasting scheduled appointments he will just give us a call back when he gets his schedule.    Sincerely,  Rachel Fox, [facility]   Certified Clinical Medical Assistant to Dr. Roly cohen  Phone: [phone]  Fax: [phone]          ----- Message from Mahesh Liao sent at 3/11/2025  1:22 PM CDT -----  Regarding: FW: PT'S REQUSTING A CALL BACK FROM KEEGAN REGARDING RESCHEDULING  Contact: PT  Good afternoon,Can you please let him know that I changed it? Sincerely, Keegan Kim MS, [facility] & Clinical Assistant to Dr. Roly Adames IIIPhone: [phone]: [phone]  ----- Message -----  From: Saulo Baires  Sent: 3/11/2025   1:07 PM CDT  To: [facility] Staff  Subject: PT'S REQUSTING A CALL BACK FROM KEEGAN [place]#    Confirmed contact info below:Contact Name: Jerel SerraPhone Number: [phone]

## 2025-03-12 NOTE — TELEPHONE ENCOUNTER
----- Message from Med Assistant Rachel sent at 3/12/2025 11:33 AM CDT -----  Regarding: RE: PT'S REQUSTING A CALL BACK FROM KEEGAN REGARDING RESCHEDULING  Contact: PT  Good Morning,Spoke to and informed Mr. Serra of his new appointment date and time. He stated that-that date and time may not work for him being that he is about to start his new job. He stated instead of wasting scheduled appointments he would like to cancel that appointment and will just give us a call back when he gets his new work schedule.Sincerely,Rachel Fox, [facility] Certified Clinical Medical Assistant to Dr. Roly Adames ll[place]: [phone]: [phone]  ----- Message -----  From: Keshawn Kim  Sent: 3/11/2025   1:22 PM CDT  To: Rachel Fox MA  Subject: FW: PT'S REQUSTING A CALL BACK FROM KEEGAN REG#    Good afternoon,Can you please let him know that I changed it? Sincerely, Keegan Kim MS, [employer] & Clinical Assistant to Dr. Roly Adames IIIPhone: [phone]: [phone]  ----- Message -----  From: Saulo Baires  Sent: 3/11/2025   1:07 PM CDT  To: [facility] Staff  Subject: PT'S REQUSTING A CALL BACK FROM KEEGAN [place]#    Confirmed contact info below:Contact Name: Jerel SerraPhone Number: [phone]

## 2025-05-06 ENCOUNTER — TELEPHONE (OUTPATIENT)
Dept: ORTHOPEDICS | Facility: CLINIC | Age: 60
End: 2025-05-06
Payer: COMMERCIAL

## 2025-05-06 NOTE — TELEPHONE ENCOUNTER
I called the patient today regarding his voice message. I left a message for the patient to call me back. I left my name and phone number.              ----- Message from Mahesh Liao sent at 5/5/2025  6:48 PM CDT -----  Regarding: FW: PT'S REQUESTING A CALL BACK REGARDING BEING SCHEDULED FOR A FRIDAY OR A MONDAY  Contact: pt  Call to let him know that an appointment was made  ----- Message -----  From: Saulo Baires  Sent: 5/5/2025   8:39 AM CDT  To: [facility] Staff  Subject: PT'S REQUESTING A CALL BACK REGARDING BEING #    I offered pt an appt that was the first available Tuesday 8/12 and he didn't accept.. Confirmed contact info below:Contact Name: Jerel SerraPhone Number: [phone]

## 2025-08-11 ENCOUNTER — TELEPHONE (OUTPATIENT)
Dept: ORTHOPEDICS | Facility: CLINIC | Age: 60
End: 2025-08-11
Payer: COMMERCIAL

## (undated) DEVICE — UNDERGLOVES BIOGEL PI SIZE 7.5

## (undated) DEVICE — SUT MONOCYRL 4-0 PS2 UND

## (undated) DEVICE — SPONGE COTTON TRAY 4X4IN

## (undated) DEVICE — SOL BETADINE 5%

## (undated) DEVICE — DRAPE C-ARM ELAS CLIP 42X120IN

## (undated) DEVICE — COVER LIGHT HANDLE 80/CA

## (undated) DEVICE — BLADE SAGITTAL 18 X 1.27 X 90M

## (undated) DEVICE — KIT TOTAL HIP HPOFH OMC

## (undated) DEVICE — GOWN SMARTGOWN 3XL XLONG

## (undated) DEVICE — DRESSING TELFA N ADH 3X8

## (undated) DEVICE — DRAPE THREE-QTR REINF 53X77IN

## (undated) DEVICE — PENCIL SMK EVAC CONNECTOR 10FT

## (undated) DEVICE — UNDERGLOVES BIOGEL PI SIZE 8

## (undated) DEVICE — TAPE SILK 3IN

## (undated) DEVICE — NDL HYPO STD REG BVL 18GX1.5IN

## (undated) DEVICE — ALCOHOL 70% ANTISEPTIC ISO 4OZ

## (undated) DEVICE — KIT PT CARE HANA PROFX SSXT

## (undated) DEVICE — ELECTRODE REM PLYHSV RETURN 9

## (undated) DEVICE — SOL SALINE IRRIGATION 250ML

## (undated) DEVICE — UNDERGLOVES BIOGEL PI SIZE 8.5

## (undated) DEVICE — SEALER AQUAMANTYS 3.48MM

## (undated) DEVICE — DRAPE IOBAN 2 STERI

## (undated) DEVICE — GLOVE BIOGEL PI MICRO SZ 7

## (undated) DEVICE — SOL NACL IRR 1000ML BTL

## (undated) DEVICE — SUT 2/0 36IN COATED VICRYL

## (undated) DEVICE — HOOD T7 W/ PEEL AWAY LENS

## (undated) DEVICE — SYS CLSR DERMABOND PRINEO 22CM

## (undated) DEVICE — PULSAVAC ZIMMER

## (undated) DEVICE — BNDG COFLEX FOAM LF2 ST 6X5YD

## (undated) DEVICE — SUT MONOCRYL 3-0 PS-2 UND

## (undated) DEVICE — GLOVE BIOGEL SKINSENSE PI 8.0

## (undated) DEVICE — DRESSING TRANS 4X4 TEGADERM

## (undated) DEVICE — SUT 1 36IN COATED VICRYL UN

## (undated) DEVICE — DRESSING AQUACEL AG RBBN 2X45

## (undated) DEVICE — BRUSH SCRUB HIBICLENS 4%

## (undated) DEVICE — PENCIL ROCKER SWITCH 10FT CORD

## (undated) DEVICE — PACK ECLIPSE UNIVERSAL STERILE

## (undated) DEVICE — TOWEL OR XRAY WHITE 17X26IN